# Patient Record
Sex: FEMALE | Race: WHITE | NOT HISPANIC OR LATINO | Employment: OTHER | URBAN - METROPOLITAN AREA
[De-identification: names, ages, dates, MRNs, and addresses within clinical notes are randomized per-mention and may not be internally consistent; named-entity substitution may affect disease eponyms.]

---

## 2017-02-15 RX ORDER — PROPAFENONE HYDROCHLORIDE 225 MG/1
CAPSULE, EXTENDED RELEASE ORAL
Qty: 180 CAPSULE | Refills: 2 | Status: SHIPPED | OUTPATIENT
Start: 2017-02-15 | End: 2017-06-12

## 2017-04-13 RX ORDER — METOPROLOL SUCCINATE 25 MG/1
TABLET, EXTENDED RELEASE ORAL
Qty: 90 TABLET | Refills: 2 | Status: SHIPPED | OUTPATIENT
Start: 2017-04-13 | End: 2017-06-12

## 2017-05-09 PROBLEM — Z85.3 HISTORY OF BREAST CANCER: Status: ACTIVE | Noted: 2017-05-09

## 2017-05-09 PROBLEM — I48.91 ATRIAL FIBRILLATION (HCC): Status: ACTIVE | Noted: 2017-05-09

## 2017-05-09 PROBLEM — E78.5 HYPERLIPIDEMIA: Status: ACTIVE | Noted: 2017-05-09

## 2017-05-09 PROBLEM — I10 HYPERTENSION: Status: ACTIVE | Noted: 2017-05-09

## 2017-05-09 PROBLEM — E03.9 HYPOTHYROIDISM: Status: ACTIVE | Noted: 2017-05-09

## 2017-05-09 PROBLEM — K21.9 GERD (GASTROESOPHAGEAL REFLUX DISEASE): Status: ACTIVE | Noted: 2017-05-09

## 2017-05-09 PROBLEM — M81.0 OSTEOPOROSIS: Status: ACTIVE | Noted: 2017-05-09

## 2017-05-09 PROBLEM — G25.81 RLS (RESTLESS LEGS SYNDROME): Status: ACTIVE | Noted: 2017-05-09

## 2017-05-09 PROBLEM — G43.909 MIGRAINE: Status: ACTIVE | Noted: 2017-05-09

## 2017-05-15 ENCOUNTER — OFFICE VISIT (OUTPATIENT)
Dept: CARDIOLOGY | Facility: CLINIC | Age: 82
End: 2017-05-15

## 2017-05-15 VITALS
BODY MASS INDEX: 17.49 KG/M2 | SYSTOLIC BLOOD PRESSURE: 130 MMHG | DIASTOLIC BLOOD PRESSURE: 72 MMHG | HEART RATE: 78 BPM | WEIGHT: 108.8 LBS | HEIGHT: 66 IN

## 2017-05-15 DIAGNOSIS — I38 VALVULAR HEART DISEASE: Primary | ICD-10-CM

## 2017-05-15 DIAGNOSIS — E78.2 MIXED HYPERLIPIDEMIA: ICD-10-CM

## 2017-05-15 DIAGNOSIS — I10 ESSENTIAL HYPERTENSION: ICD-10-CM

## 2017-05-15 DIAGNOSIS — R06.09 DYSPNEA ON EXERTION: ICD-10-CM

## 2017-05-15 DIAGNOSIS — I48.20 CHRONIC ATRIAL FIBRILLATION (HCC): ICD-10-CM

## 2017-05-15 PROCEDURE — 99214 OFFICE O/P EST MOD 30 MIN: CPT | Performed by: INTERNAL MEDICINE

## 2017-05-15 PROCEDURE — 93288 INTERROG EVL PM/LDLS PM IP: CPT | Performed by: INTERNAL MEDICINE

## 2017-05-19 ENCOUNTER — PREP FOR SURGERY (OUTPATIENT)
Dept: CARDIOLOGY | Facility: CLINIC | Age: 82
End: 2017-05-19

## 2017-05-19 DIAGNOSIS — R06.09 DYSPNEA ON EXERTION: Primary | ICD-10-CM

## 2017-05-19 DIAGNOSIS — I10 ESSENTIAL (PRIMARY) HYPERTENSION: ICD-10-CM

## 2017-05-19 RX ORDER — ONDANSETRON 2 MG/ML
4 INJECTION INTRAMUSCULAR; INTRAVENOUS EVERY 6 HOURS PRN
Status: CANCELLED | OUTPATIENT
Start: 2017-05-19

## 2017-05-19 RX ORDER — ASPIRIN 325 MG
325 TABLET ORAL ONCE
Status: CANCELLED | OUTPATIENT
Start: 2017-05-19 | End: 2017-05-19

## 2017-05-19 RX ORDER — ASPIRIN 81 MG/1
325 TABLET ORAL DAILY
Status: CANCELLED | OUTPATIENT
Start: 2017-05-20

## 2017-05-19 RX ORDER — SODIUM CHLORIDE 0.9 % (FLUSH) 0.9 %
1-10 SYRINGE (ML) INJECTION AS NEEDED
Status: CANCELLED | OUTPATIENT
Start: 2017-05-19

## 2017-05-19 RX ORDER — NITROGLYCERIN 0.4 MG/1
0.4 TABLET SUBLINGUAL
Status: CANCELLED | OUTPATIENT
Start: 2017-05-19

## 2017-05-19 RX ORDER — ACETAMINOPHEN 325 MG/1
650 TABLET ORAL EVERY 4 HOURS PRN
Status: CANCELLED | OUTPATIENT
Start: 2017-05-19

## 2017-05-21 ENCOUNTER — HOSPITAL ENCOUNTER (OUTPATIENT)
Dept: GENERAL RADIOLOGY | Facility: HOSPITAL | Age: 82
Discharge: HOME | End: 2017-05-21
Admitting: NURSE PRACTITIONER

## 2017-05-21 ENCOUNTER — APPOINTMENT (OUTPATIENT)
Dept: PREADMISSION TESTING | Facility: HOSPITAL | Age: 82
End: 2017-05-21

## 2017-05-21 DIAGNOSIS — R06.09 DYSPNEA ON EXERTION: ICD-10-CM

## 2017-05-21 LAB
ALBUMIN SERPL-MCNC: 3.8 G/DL (ref 3.2–4.8)
ALBUMIN/GLOB SERPL: 1.4 G/DL (ref 1.5–2.5)
ALP SERPL-CCNC: 53 U/L (ref 25–100)
ALT SERPL W P-5'-P-CCNC: 19 U/L (ref 7–40)
ANION GAP SERPL CALCULATED.3IONS-SCNC: 1 MMOL/L (ref 3–11)
AST SERPL-CCNC: 28 U/L (ref 0–33)
BILIRUB SERPL-MCNC: 0.3 MG/DL (ref 0.3–1.2)
BUN BLD-MCNC: 24 MG/DL (ref 9–23)
BUN/CREAT SERPL: 30 (ref 7–25)
CALCIUM SPEC-SCNC: 9.7 MG/DL (ref 8.7–10.4)
CHLORIDE SERPL-SCNC: 106 MMOL/L (ref 99–109)
CO2 SERPL-SCNC: 34 MMOL/L (ref 20–31)
CREAT BLD-MCNC: 0.8 MG/DL (ref 0.6–1.3)
DEPRECATED RDW RBC AUTO: 52.3 FL (ref 37–54)
ERYTHROCYTE [DISTWIDTH] IN BLOOD BY AUTOMATED COUNT: 14.2 % (ref 11.3–14.5)
GFR SERPL CREATININE-BSD FRML MDRD: 68 ML/MIN/1.73
GLOBULIN UR ELPH-MCNC: 2.8 GM/DL
GLUCOSE BLD-MCNC: 93 MG/DL (ref 70–100)
HCT VFR BLD AUTO: 39.9 % (ref 34.5–44)
HGB BLD-MCNC: 12.2 G/DL (ref 11.5–15.5)
MCH RBC QN AUTO: 30.7 PG (ref 27–31)
MCHC RBC AUTO-ENTMCNC: 30.6 G/DL (ref 32–36)
MCV RBC AUTO: 100.3 FL (ref 80–99)
PLATELET # BLD AUTO: 142 10*3/MM3 (ref 150–450)
PMV BLD AUTO: 9.8 FL (ref 6–12)
POTASSIUM BLD-SCNC: 5 MMOL/L (ref 3.5–5.5)
PROT SERPL-MCNC: 6.6 G/DL (ref 5.7–8.2)
RBC # BLD AUTO: 3.98 10*6/MM3 (ref 3.89–5.14)
SODIUM BLD-SCNC: 141 MMOL/L (ref 132–146)
WBC NRBC COR # BLD: 4.62 10*3/MM3 (ref 3.5–10.8)

## 2017-05-21 PROCEDURE — 71020 HC CHEST PA AND LATERAL: CPT

## 2017-05-21 PROCEDURE — 85027 COMPLETE CBC AUTOMATED: CPT | Performed by: NURSE PRACTITIONER

## 2017-05-21 PROCEDURE — 80053 COMPREHEN METABOLIC PANEL: CPT | Performed by: NURSE PRACTITIONER

## 2017-05-21 PROCEDURE — 36415 COLL VENOUS BLD VENIPUNCTURE: CPT

## 2017-05-21 RX ORDER — OMEPRAZOLE 40 MG/1
40 CAPSULE, DELAYED RELEASE ORAL EVERY MORNING
COMMUNITY

## 2017-05-21 RX ORDER — UBIDECARENONE 100 MG
100 CAPSULE ORAL DAILY
COMMUNITY
End: 2017-09-11

## 2017-05-21 RX ORDER — LEVOTHYROXINE SODIUM 0.15 MG/1
150 TABLET ORAL EVERY MORNING
COMMUNITY
End: 2018-01-01 | Stop reason: HOSPADM

## 2017-05-21 RX ORDER — MELATONIN
1000 DAILY
COMMUNITY

## 2017-05-21 RX ORDER — LANOLIN ALCOHOL/MO/W.PET/CERES
1000 CREAM (GRAM) TOPICAL DAILY
COMMUNITY

## 2017-05-22 ENCOUNTER — HOSPITAL ENCOUNTER (OUTPATIENT)
Facility: HOSPITAL | Age: 82
Discharge: HOME | End: 2017-05-23
Attending: INTERNAL MEDICINE | Admitting: INTERNAL MEDICINE

## 2017-05-22 ENCOUNTER — APPOINTMENT (OUTPATIENT)
Dept: CARDIOLOGY | Facility: HOSPITAL | Age: 82
End: 2017-05-22
Attending: INTERNAL MEDICINE

## 2017-05-22 DIAGNOSIS — R06.09 DYSPNEA ON EXERTION: ICD-10-CM

## 2017-05-22 LAB
ACT BLD: 229 SECONDS (ref 82–152)
ACT BLD: 358 SECONDS (ref 82–152)
ARTICHOKE IGE QN: 84 MG/DL (ref 0–130)
BH CV ECHO MEAS - BSA(HAYCOCK): 1.5 M^2
BH CV ECHO MEAS - BSA: 1.5 M^2
BH CV ECHO MEAS - BZI_BMI: 17.4 KILOGRAMS/M^2
BH CV ECHO MEAS - BZI_METRIC_HEIGHT: 167.6 CM
BH CV ECHO MEAS - BZI_METRIC_WEIGHT: 49 KG
BH CV ECHO MEAS - LA DIMENSION: 4 CM
BH CV ECHO MEAS - MV MAX PG: 6.7 MMHG
BH CV ECHO MEAS - MV MEAN PG: 1.8 MMHG
BH CV ECHO MEAS - MV V2 MAX: 129.2 CM/SEC
BH CV ECHO MEAS - MV V2 MEAN: 60.3 CM/SEC
BH CV ECHO MEAS - MV V2 VTI: 34.4 CM
CHOLEST SERPL-MCNC: 150 MG/DL (ref 0–200)
HDLC SERPL-MCNC: 66 MG/DL (ref 40–60)
LV EF 2D ECHO EST: 60 %
TRIGL SERPL-MCNC: 77 MG/DL (ref 0–150)

## 2017-05-22 PROCEDURE — A9270 NON-COVERED ITEM OR SERVICE: HCPCS | Performed by: INTERNAL MEDICINE

## 2017-05-22 PROCEDURE — 93458 L HRT ARTERY/VENTRICLE ANGIO: CPT | Performed by: INTERNAL MEDICINE

## 2017-05-22 PROCEDURE — 25010000002 HEPARIN (PORCINE) PER 1000 UNITS: Performed by: INTERNAL MEDICINE

## 2017-05-22 PROCEDURE — 93312 ECHO TRANSESOPHAGEAL: CPT | Performed by: INTERNAL MEDICINE

## 2017-05-22 PROCEDURE — 93320 DOPPLER ECHO COMPLETE: CPT | Performed by: INTERNAL MEDICINE

## 2017-05-22 PROCEDURE — 37221 PR REVSC OPN/PRQ ILIAC ART W/STNT PLMT & ANGIOPLSTY: CPT | Performed by: INTERNAL MEDICINE

## 2017-05-22 PROCEDURE — 93312 ECHO TRANSESOPHAGEAL: CPT

## 2017-05-22 PROCEDURE — C1876 STENT, NON-COA/NON-COV W/DEL: HCPCS | Performed by: INTERNAL MEDICINE

## 2017-05-22 PROCEDURE — 85347 COAGULATION TIME ACTIVATED: CPT

## 2017-05-22 PROCEDURE — 63710000001 PRAVASTATIN 40 MG TABLET: Performed by: INTERNAL MEDICINE

## 2017-05-22 PROCEDURE — C1769 GUIDE WIRE: HCPCS | Performed by: INTERNAL MEDICINE

## 2017-05-22 PROCEDURE — 80061 LIPID PANEL: CPT | Performed by: INTERNAL MEDICINE

## 2017-05-22 PROCEDURE — 93320 DOPPLER ECHO COMPLETE: CPT

## 2017-05-22 PROCEDURE — 63710000001 PROPAFENONE SR 225 MG CAPSULE SUSTAINED-RELEASE 12 HR: Performed by: INTERNAL MEDICINE

## 2017-05-22 PROCEDURE — C1725 CATH, TRANSLUMIN NON-LASER: HCPCS | Performed by: INTERNAL MEDICINE

## 2017-05-22 PROCEDURE — 25010000002 FENTANYL CITRATE (PF) 100 MCG/2ML SOLUTION: Performed by: INTERNAL MEDICINE

## 2017-05-22 PROCEDURE — 36415 COLL VENOUS BLD VENIPUNCTURE: CPT

## 2017-05-22 PROCEDURE — C1894 INTRO/SHEATH, NON-LASER: HCPCS | Performed by: INTERNAL MEDICINE

## 2017-05-22 PROCEDURE — 63710000001 DOCUSATE SODIUM 100 MG CAPSULE: Performed by: INTERNAL MEDICINE

## 2017-05-22 PROCEDURE — 0 IOPAMIDOL PER 1 ML: Performed by: INTERNAL MEDICINE

## 2017-05-22 PROCEDURE — 63710000001: Performed by: INTERNAL MEDICINE

## 2017-05-22 PROCEDURE — 75710 ARTERY X-RAYS ARM/LEG: CPT | Performed by: INTERNAL MEDICINE

## 2017-05-22 PROCEDURE — 93325 DOPPLER ECHO COLOR FLOW MAPG: CPT

## 2017-05-22 PROCEDURE — 63710000001 CHOLECALCIFEROL 1000 UNITS TABLET: Performed by: INTERNAL MEDICINE

## 2017-05-22 PROCEDURE — 93325 DOPPLER ECHO COLOR FLOW MAPG: CPT | Performed by: INTERNAL MEDICINE

## 2017-05-22 PROCEDURE — 63710000001 VITAMIN B-12 1000 MCG TABLET: Performed by: INTERNAL MEDICINE

## 2017-05-22 PROCEDURE — 25010000002 MIDAZOLAM PER 1 MG: Performed by: INTERNAL MEDICINE

## 2017-05-22 PROCEDURE — 63710000001 MELOXICAM 7.5 MG TABLET: Performed by: INTERNAL MEDICINE

## 2017-05-22 DEVICE — PREMOUNTED STENT SYSTEM
Type: IMPLANTABLE DEVICE | Status: FUNCTIONAL
Brand: EXPRESS® LD ILIAC / BILIARY

## 2017-05-22 RX ORDER — FLUMAZENIL 0.1 MG/ML
INJECTION INTRAVENOUS
Status: DISCONTINUED
Start: 2017-05-22 | End: 2017-05-22 | Stop reason: WASHOUT

## 2017-05-22 RX ORDER — MELATONIN
1000 DAILY
Status: DISCONTINUED | OUTPATIENT
Start: 2017-05-22 | End: 2017-05-23 | Stop reason: HOSPADM

## 2017-05-22 RX ORDER — FENTANYL CITRATE 50 UG/ML
INJECTION, SOLUTION INTRAMUSCULAR; INTRAVENOUS
Status: COMPLETED | OUTPATIENT
Start: 2017-05-22 | End: 2017-05-22

## 2017-05-22 RX ORDER — SODIUM CHLORIDE 0.9 % (FLUSH) 0.9 %
1-10 SYRINGE (ML) INJECTION AS NEEDED
Status: DISCONTINUED | OUTPATIENT
Start: 2017-05-22 | End: 2017-05-22 | Stop reason: HOSPADM

## 2017-05-22 RX ORDER — PROPAFENONE HYDROCHLORIDE 225 MG/1
225 CAPSULE, EXTENDED RELEASE ORAL EVERY 12 HOURS SCHEDULED
Status: DISCONTINUED | OUTPATIENT
Start: 2017-05-22 | End: 2017-05-23 | Stop reason: HOSPADM

## 2017-05-22 RX ORDER — HEPARIN SODIUM 1000 [USP'U]/ML
INJECTION, SOLUTION INTRAVENOUS; SUBCUTANEOUS AS NEEDED
Status: DISCONTINUED | OUTPATIENT
Start: 2017-05-22 | End: 2017-05-22 | Stop reason: HOSPADM

## 2017-05-22 RX ORDER — ACETAMINOPHEN 325 MG/1
650 TABLET ORAL EVERY 4 HOURS PRN
Status: DISCONTINUED | OUTPATIENT
Start: 2017-05-22 | End: 2017-05-23 | Stop reason: HOSPADM

## 2017-05-22 RX ORDER — METOPROLOL SUCCINATE 25 MG/1
25 TABLET, EXTENDED RELEASE ORAL
Status: DISCONTINUED | OUTPATIENT
Start: 2017-05-23 | End: 2017-05-23 | Stop reason: HOSPADM

## 2017-05-22 RX ORDER — SENNA AND DOCUSATE SODIUM 50; 8.6 MG/1; MG/1
2 TABLET, FILM COATED ORAL NIGHTLY
Status: DISCONTINUED | OUTPATIENT
Start: 2017-05-22 | End: 2017-05-23 | Stop reason: HOSPADM

## 2017-05-22 RX ORDER — CLONAZEPAM 0.5 MG/1
0.5 TABLET ORAL DAILY
Status: DISCONTINUED | OUTPATIENT
Start: 2017-05-22 | End: 2017-05-22 | Stop reason: SDUPTHER

## 2017-05-22 RX ORDER — MIDAZOLAM HYDROCHLORIDE 1 MG/ML
INJECTION INTRAMUSCULAR; INTRAVENOUS
Status: COMPLETED | OUTPATIENT
Start: 2017-05-22 | End: 2017-05-22

## 2017-05-22 RX ORDER — CLOPIDOGREL BISULFATE 75 MG/1
600 TABLET ORAL ONCE
Status: DISCONTINUED | OUTPATIENT
Start: 2017-05-22 | End: 2017-05-23 | Stop reason: HOSPADM

## 2017-05-22 RX ORDER — TEMAZEPAM 7.5 MG/1
7.5 CAPSULE ORAL NIGHTLY PRN
Status: DISCONTINUED | OUTPATIENT
Start: 2017-05-22 | End: 2017-05-23 | Stop reason: HOSPADM

## 2017-05-22 RX ORDER — ACETAMINOPHEN 325 MG/1
650 TABLET ORAL EVERY 4 HOURS PRN
Status: DISCONTINUED | OUTPATIENT
Start: 2017-05-22 | End: 2017-05-22 | Stop reason: HOSPADM

## 2017-05-22 RX ORDER — DOCUSATE SODIUM 100 MG/1
100 CAPSULE, LIQUID FILLED ORAL 2 TIMES DAILY
Status: DISCONTINUED | OUTPATIENT
Start: 2017-05-22 | End: 2017-05-23 | Stop reason: HOSPADM

## 2017-05-22 RX ORDER — HYDROCODONE BITARTRATE AND ACETAMINOPHEN 5; 325 MG/1; MG/1
1 TABLET ORAL EVERY 6 HOURS PRN
Status: DISCONTINUED | OUTPATIENT
Start: 2017-05-22 | End: 2017-05-23 | Stop reason: HOSPADM

## 2017-05-22 RX ORDER — ONDANSETRON 2 MG/ML
4 INJECTION INTRAMUSCULAR; INTRAVENOUS EVERY 6 HOURS PRN
Status: DISCONTINUED | OUTPATIENT
Start: 2017-05-22 | End: 2017-05-22 | Stop reason: HOSPADM

## 2017-05-22 RX ORDER — CLONAZEPAM 0.5 MG/1
0.5 TABLET ORAL NIGHTLY
Status: DISCONTINUED | OUTPATIENT
Start: 2017-05-22 | End: 2017-05-23 | Stop reason: HOSPADM

## 2017-05-22 RX ORDER — MORPHINE SULFATE 2 MG/ML
1 INJECTION, SOLUTION INTRAMUSCULAR; INTRAVENOUS EVERY 4 HOURS PRN
Status: DISCONTINUED | OUTPATIENT
Start: 2017-05-22 | End: 2017-05-23 | Stop reason: HOSPADM

## 2017-05-22 RX ORDER — ASPIRIN 325 MG
325 TABLET, DELAYED RELEASE (ENTERIC COATED) ORAL DAILY
Status: DISCONTINUED | OUTPATIENT
Start: 2017-05-22 | End: 2017-05-23 | Stop reason: HOSPADM

## 2017-05-22 RX ORDER — HYDROCODONE BITARTRATE AND ACETAMINOPHEN 7.5; 325 MG/1; MG/1
1 TABLET ORAL EVERY 4 HOURS PRN
Status: DISCONTINUED | OUTPATIENT
Start: 2017-05-22 | End: 2017-05-23 | Stop reason: HOSPADM

## 2017-05-22 RX ORDER — FENTANYL CITRATE 50 UG/ML
INJECTION, SOLUTION INTRAMUSCULAR; INTRAVENOUS AS NEEDED
Status: DISCONTINUED | OUTPATIENT
Start: 2017-05-22 | End: 2017-05-22 | Stop reason: HOSPADM

## 2017-05-22 RX ORDER — ASPIRIN 325 MG
325 TABLET, DELAYED RELEASE (ENTERIC COATED) ORAL DAILY
Status: DISCONTINUED | OUTPATIENT
Start: 2017-05-23 | End: 2017-05-22 | Stop reason: HOSPADM

## 2017-05-22 RX ORDER — MIDAZOLAM HYDROCHLORIDE 1 MG/ML
INJECTION INTRAMUSCULAR; INTRAVENOUS AS NEEDED
Status: DISCONTINUED | OUTPATIENT
Start: 2017-05-22 | End: 2017-05-22 | Stop reason: HOSPADM

## 2017-05-22 RX ORDER — PRAVASTATIN SODIUM 40 MG
40 TABLET ORAL NIGHTLY
Status: DISCONTINUED | OUTPATIENT
Start: 2017-05-22 | End: 2017-05-23 | Stop reason: HOSPADM

## 2017-05-22 RX ORDER — LIDOCAINE HYDROCHLORIDE 10 MG/ML
INJECTION, SOLUTION INFILTRATION; PERINEURAL AS NEEDED
Status: DISCONTINUED | OUTPATIENT
Start: 2017-05-22 | End: 2017-05-22 | Stop reason: HOSPADM

## 2017-05-22 RX ORDER — NALOXONE HCL 0.4 MG/ML
0.4 VIAL (ML) INJECTION
Status: DISCONTINUED | OUTPATIENT
Start: 2017-05-22 | End: 2017-05-23 | Stop reason: HOSPADM

## 2017-05-22 RX ORDER — NITROGLYCERIN 0.4 MG/1
0.4 TABLET SUBLINGUAL
Status: DISCONTINUED | OUTPATIENT
Start: 2017-05-22 | End: 2017-05-22 | Stop reason: HOSPADM

## 2017-05-22 RX ORDER — FENTANYL CITRATE 50 UG/ML
INJECTION, SOLUTION INTRAMUSCULAR; INTRAVENOUS
Status: DISCONTINUED
Start: 2017-05-22 | End: 2017-05-23 | Stop reason: HOSPADM

## 2017-05-22 RX ORDER — WARFARIN SODIUM 3 MG/1
3 TABLET ORAL
Status: DISCONTINUED | OUTPATIENT
Start: 2017-05-22 | End: 2017-05-23 | Stop reason: HOSPADM

## 2017-05-22 RX ORDER — NALOXONE HYDROCHLORIDE 0.4 MG/ML
INJECTION, SOLUTION INTRAMUSCULAR; INTRAVENOUS; SUBCUTANEOUS
Status: DISCONTINUED
Start: 2017-05-22 | End: 2017-05-22 | Stop reason: WASHOUT

## 2017-05-22 RX ORDER — LANOLIN ALCOHOL/MO/W.PET/CERES
500 CREAM (GRAM) TOPICAL DAILY
Status: DISCONTINUED | OUTPATIENT
Start: 2017-05-22 | End: 2017-05-23 | Stop reason: HOSPADM

## 2017-05-22 RX ORDER — CLOPIDOGREL BISULFATE 75 MG/1
TABLET ORAL AS NEEDED
Status: DISCONTINUED | OUTPATIENT
Start: 2017-05-22 | End: 2017-05-22 | Stop reason: HOSPADM

## 2017-05-22 RX ORDER — ASPIRIN 325 MG
325 TABLET ORAL ONCE
Status: COMPLETED | OUTPATIENT
Start: 2017-05-22 | End: 2017-05-22

## 2017-05-22 RX ORDER — MELOXICAM 7.5 MG/1
7.5 TABLET ORAL DAILY
Status: DISCONTINUED | OUTPATIENT
Start: 2017-05-22 | End: 2017-05-23 | Stop reason: HOSPADM

## 2017-05-22 RX ORDER — MIDAZOLAM HYDROCHLORIDE 1 MG/ML
INJECTION INTRAMUSCULAR; INTRAVENOUS
Status: DISCONTINUED
Start: 2017-05-22 | End: 2017-05-23 | Stop reason: HOSPADM

## 2017-05-22 RX ORDER — WARFARIN SODIUM 3 MG/1
1.5 TABLET ORAL
Status: DISCONTINUED | OUTPATIENT
Start: 2017-05-22 | End: 2017-05-23 | Stop reason: HOSPADM

## 2017-05-22 RX ORDER — SODIUM CHLORIDE 9 MG/ML
1-3 INJECTION, SOLUTION INTRAVENOUS CONTINUOUS
Status: DISCONTINUED | OUTPATIENT
Start: 2017-05-22 | End: 2017-05-23 | Stop reason: HOSPADM

## 2017-05-22 RX ORDER — POLYETHYLENE GLYCOL 3350 17 G/17G
17 POWDER, FOR SOLUTION ORAL DAILY
Status: DISCONTINUED | OUTPATIENT
Start: 2017-05-22 | End: 2017-05-23 | Stop reason: HOSPADM

## 2017-05-22 RX ORDER — BISACODYL 10 MG
10 SUPPOSITORY, RECTAL RECTAL DAILY PRN
Status: DISCONTINUED | OUTPATIENT
Start: 2017-05-22 | End: 2017-05-23 | Stop reason: HOSPADM

## 2017-05-22 RX ORDER — CLOPIDOGREL BISULFATE 75 MG/1
75 TABLET ORAL DAILY
Status: DISCONTINUED | OUTPATIENT
Start: 2017-05-23 | End: 2017-05-23

## 2017-05-22 RX ORDER — SODIUM CHLORIDE 9 MG/ML
50 INJECTION, SOLUTION INTRAVENOUS CONTINUOUS
Status: ACTIVE | OUTPATIENT
Start: 2017-05-22 | End: 2017-05-22

## 2017-05-22 RX ORDER — LEVOTHYROXINE SODIUM 0.15 MG/1
150 TABLET ORAL DAILY
Status: DISCONTINUED | OUTPATIENT
Start: 2017-05-22 | End: 2017-05-23 | Stop reason: HOSPADM

## 2017-05-22 RX ADMIN — FENTANYL CITRATE 50 MCG: 50 INJECTION, SOLUTION INTRAMUSCULAR; INTRAVENOUS at 14:58

## 2017-05-22 RX ADMIN — VITAMIN D, TAB 1000IU (100/BT) 1000 UNITS: 25 TAB at 11:56

## 2017-05-22 RX ADMIN — PROPAFENONE HYDROCHLORIDE 225 MG: 225 CAPSULE, EXTENDED RELEASE ORAL at 20:01

## 2017-05-22 RX ADMIN — PANCRELIPASE 24000 UNITS OF LIPASE: 24000; 76000; 120000 CAPSULE, DELAYED RELEASE PELLETS ORAL at 19:59

## 2017-05-22 RX ADMIN — MIDAZOLAM HYDROCHLORIDE 2 MG: 1 INJECTION, SOLUTION INTRAMUSCULAR; INTRAVENOUS at 15:01

## 2017-05-22 RX ADMIN — MELOXICAM 7.5 MG: 7.5 TABLET ORAL at 11:56

## 2017-05-22 RX ADMIN — MIDAZOLAM HYDROCHLORIDE 2 MG: 1 INJECTION, SOLUTION INTRAMUSCULAR; INTRAVENOUS at 14:57

## 2017-05-22 RX ADMIN — FENTANYL CITRATE 50 MCG: 50 INJECTION, SOLUTION INTRAMUSCULAR; INTRAVENOUS at 15:01

## 2017-05-22 RX ADMIN — SODIUM CHLORIDE 3 ML/KG/HR: 9 INJECTION, SOLUTION INTRAVENOUS at 08:05

## 2017-05-22 RX ADMIN — PANCRELIPASE 24000 UNITS OF LIPASE: 24000; 76000; 120000 CAPSULE, DELAYED RELEASE PELLETS ORAL at 11:56

## 2017-05-22 RX ADMIN — CYANOCOBALAMIN TAB 1000 MCG 500 MCG: 1000 TAB at 11:56

## 2017-05-22 RX ADMIN — ASPIRIN 325 MG ORAL TABLET 325 MG: 325 PILL ORAL at 08:04

## 2017-05-22 RX ADMIN — DOCUSATE SODIUM 100 MG: 100 CAPSULE, LIQUID FILLED ORAL at 19:58

## 2017-05-22 RX ADMIN — PRAVASTATIN SODIUM 40 MG: 40 TABLET ORAL at 20:00

## 2017-05-23 VITALS
SYSTOLIC BLOOD PRESSURE: 138 MMHG | HEIGHT: 66 IN | BODY MASS INDEX: 17.47 KG/M2 | OXYGEN SATURATION: 92 % | WEIGHT: 108.69 LBS | HEART RATE: 75 BPM | TEMPERATURE: 97.5 F | RESPIRATION RATE: 16 BRPM | DIASTOLIC BLOOD PRESSURE: 61 MMHG

## 2017-05-23 LAB
ACT BLD: 157 SECONDS (ref 82–152)
ACT BLD: 178 SECONDS (ref 82–152)
ACT BLD: 214 SECONDS (ref 82–152)
ACT BLD: 240 SECONDS (ref 82–152)
ANION GAP SERPL CALCULATED.3IONS-SCNC: 5 MMOL/L (ref 3–11)
BUN BLD-MCNC: 15 MG/DL (ref 9–23)
BUN/CREAT SERPL: 21.4 (ref 7–25)
CALCIUM SPEC-SCNC: 9.2 MG/DL (ref 8.7–10.4)
CHLORIDE SERPL-SCNC: 104 MMOL/L (ref 99–109)
CO2 SERPL-SCNC: 30 MMOL/L (ref 20–31)
CREAT BLD-MCNC: 0.7 MG/DL (ref 0.6–1.3)
GFR SERPL CREATININE-BSD FRML MDRD: 79 ML/MIN/1.73
GLUCOSE BLD-MCNC: 100 MG/DL (ref 70–100)
POTASSIUM BLD-SCNC: 4.2 MMOL/L (ref 3.5–5.5)
SODIUM BLD-SCNC: 139 MMOL/L (ref 132–146)

## 2017-05-23 PROCEDURE — 63710000001 PROPAFENONE SR 225 MG CAPSULE SUSTAINED-RELEASE 12 HR: Performed by: INTERNAL MEDICINE

## 2017-05-23 PROCEDURE — A9270 NON-COVERED ITEM OR SERVICE: HCPCS | Performed by: INTERNAL MEDICINE

## 2017-05-23 PROCEDURE — 80048 BASIC METABOLIC PNL TOTAL CA: CPT | Performed by: INTERNAL MEDICINE

## 2017-05-23 PROCEDURE — 63710000001 ASPIRIN EC 325 MG TABLET DELAYED-RELEASE: Performed by: INTERNAL MEDICINE

## 2017-05-23 PROCEDURE — 63710000001 CLOPIDOGREL 75 MG TABLET: Performed by: INTERNAL MEDICINE

## 2017-05-23 PROCEDURE — 63710000001 VITAMIN B-12 1000 MCG TABLET: Performed by: INTERNAL MEDICINE

## 2017-05-23 PROCEDURE — 63710000001 CHOLECALCIFEROL 1000 UNITS TABLET: Performed by: INTERNAL MEDICINE

## 2017-05-23 PROCEDURE — 63710000001 LEVOTHYROXINE 150 MCG TABLET: Performed by: INTERNAL MEDICINE

## 2017-05-23 PROCEDURE — 63710000001 MELOXICAM 7.5 MG TABLET: Performed by: INTERNAL MEDICINE

## 2017-05-23 PROCEDURE — 99213 OFFICE O/P EST LOW 20 MIN: CPT | Performed by: INTERNAL MEDICINE

## 2017-05-23 PROCEDURE — 63710000001: Performed by: INTERNAL MEDICINE

## 2017-05-23 RX ORDER — CLOPIDOGREL BISULFATE 75 MG/1
75 TABLET ORAL DAILY
Qty: 30 TABLET | Refills: 1 | Status: CANCELLED | OUTPATIENT
Start: 2017-05-23

## 2017-05-23 RX ADMIN — MELOXICAM 7.5 MG: 7.5 TABLET ORAL at 08:09

## 2017-05-23 RX ADMIN — ASPIRIN 325 MG: 325 TABLET, DELAYED RELEASE ORAL at 08:14

## 2017-05-23 RX ADMIN — LEVOTHYROXINE SODIUM 150 MCG: 150 TABLET ORAL at 08:10

## 2017-05-23 RX ADMIN — CLOPIDOGREL BISULFATE 75 MG: 75 TABLET ORAL at 08:14

## 2017-05-23 RX ADMIN — VITAMIN D, TAB 1000IU (100/BT) 1000 UNITS: 25 TAB at 08:10

## 2017-05-23 RX ADMIN — PROPAFENONE HYDROCHLORIDE 225 MG: 225 CAPSULE, EXTENDED RELEASE ORAL at 08:09

## 2017-05-23 RX ADMIN — PANCRELIPASE 24000 UNITS OF LIPASE: 24000; 76000; 120000 CAPSULE, DELAYED RELEASE PELLETS ORAL at 08:08

## 2017-05-23 RX ADMIN — CYANOCOBALAMIN TAB 1000 MCG 500 MCG: 1000 TAB at 08:09

## 2017-05-25 ENCOUNTER — TRANSCRIBE ORDERS (OUTPATIENT)
Dept: CARDIOLOGY | Facility: CLINIC | Age: 82
End: 2017-05-25

## 2017-05-25 DIAGNOSIS — I34.0 SEVERE MITRAL REGURGITATION: Primary | ICD-10-CM

## 2017-05-26 ENCOUNTER — PREP FOR SURGERY (OUTPATIENT)
Dept: OTHER | Facility: HOSPITAL | Age: 82
End: 2017-05-26

## 2017-05-30 ENCOUNTER — OFFICE VISIT (OUTPATIENT)
Dept: CARDIAC SURGERY | Facility: CLINIC | Age: 82
End: 2017-05-30

## 2017-05-30 ENCOUNTER — DOCUMENTATION (OUTPATIENT)
Dept: CARDIAC REHAB | Facility: HOSPITAL | Age: 82
End: 2017-05-30

## 2017-05-30 VITALS
WEIGHT: 108.2 LBS | OXYGEN SATURATION: 96 % | HEIGHT: 66 IN | DIASTOLIC BLOOD PRESSURE: 57 MMHG | HEART RATE: 82 BPM | BODY MASS INDEX: 17.39 KG/M2 | TEMPERATURE: 97.2 F | SYSTOLIC BLOOD PRESSURE: 123 MMHG

## 2017-05-30 DIAGNOSIS — I34.0 MITRAL VALVE INSUFFICIENCY, UNSPECIFIED ETIOLOGY: Primary | ICD-10-CM

## 2017-05-30 PROCEDURE — 99205 OFFICE O/P NEW HI 60 MIN: CPT | Performed by: THORACIC SURGERY (CARDIOTHORACIC VASCULAR SURGERY)

## 2017-06-05 ENCOUNTER — TELEPHONE (OUTPATIENT)
Dept: CARDIOLOGY | Facility: HOSPITAL | Age: 82
End: 2017-06-05

## 2017-06-05 ENCOUNTER — PREP FOR SURGERY (OUTPATIENT)
Dept: OTHER | Facility: HOSPITAL | Age: 82
End: 2017-06-05

## 2017-06-05 DIAGNOSIS — I05.9 MITRAL VALVE DISEASE: Primary | ICD-10-CM

## 2017-06-05 RX ORDER — SODIUM CHLORIDE 0.9 % (FLUSH) 0.9 %
1-10 SYRINGE (ML) INJECTION AS NEEDED
Status: CANCELLED | OUTPATIENT
Start: 2017-06-05

## 2017-06-05 RX ORDER — SODIUM CHLORIDE 9 MG/ML
125 INJECTION, SOLUTION INTRAVENOUS CONTINUOUS
Status: CANCELLED | OUTPATIENT
Start: 2017-06-05

## 2017-06-05 NOTE — TELEPHONE ENCOUNTER
Spoke with Nazia Britton, daughter, for Mitraclip instructions scheduled for 6/13/17.  Last dose warfarin Wednesday 6/7/17.  PAT 5:15 PM on Tuesday 6/12/17.  Bring all meds and a .  [facility] stay after PAT.  Come to [facility] second floor of [address] building @ 0600 the morning of 6/13/17.  Ms. Britton read back to me the above information correctly.

## 2017-06-12 ENCOUNTER — HOSPITAL ENCOUNTER (OUTPATIENT)
Dept: GENERAL RADIOLOGY | Facility: HOSPITAL | Age: 82
Discharge: HOME | End: 2017-06-12
Admitting: NURSE PRACTITIONER

## 2017-06-12 ENCOUNTER — TRANSCRIBE ORDERS (OUTPATIENT)
Dept: CARDIOLOGY | Facility: CLINIC | Age: 82
End: 2017-06-12

## 2017-06-12 ENCOUNTER — APPOINTMENT (OUTPATIENT)
Dept: PREADMISSION TESTING | Facility: HOSPITAL | Age: 82
End: 2017-06-12

## 2017-06-12 DIAGNOSIS — I05.9 MITRAL VALVE DISEASE: ICD-10-CM

## 2017-06-12 DIAGNOSIS — I10 ESSENTIAL (PRIMARY) HYPERTENSION: ICD-10-CM

## 2017-06-12 DIAGNOSIS — I34.0 SEVERE MITRAL REGURGITATION BY PRIOR ECHOCARDIOGRAM: Primary | ICD-10-CM

## 2017-06-12 DIAGNOSIS — R06.09 DYSPNEA ON EXERTION: ICD-10-CM

## 2017-06-12 LAB
ANION GAP SERPL CALCULATED.3IONS-SCNC: 2 MMOL/L (ref 3–11)
ARTICHOKE IGE QN: 67 MG/DL (ref 0–130)
BUN BLD-MCNC: 26 MG/DL (ref 9–23)
BUN/CREAT SERPL: 26 (ref 7–25)
CALCIUM SPEC-SCNC: 9.6 MG/DL (ref 8.7–10.4)
CHLORIDE SERPL-SCNC: 105 MMOL/L (ref 99–109)
CHOLEST SERPL-MCNC: 144 MG/DL (ref 0–200)
CO2 SERPL-SCNC: 33 MMOL/L (ref 20–31)
CREAT BLD-MCNC: 1 MG/DL (ref 0.6–1.3)
DEPRECATED RDW RBC AUTO: 53.5 FL (ref 37–54)
ERYTHROCYTE [DISTWIDTH] IN BLOOD BY AUTOMATED COUNT: 14.3 % (ref 11.3–14.5)
GFR SERPL CREATININE-BSD FRML MDRD: 52 ML/MIN/1.73
GLUCOSE BLD-MCNC: 96 MG/DL (ref 70–100)
HCT VFR BLD AUTO: 39.1 % (ref 34.5–44)
HDLC SERPL-MCNC: 60 MG/DL (ref 40–60)
HGB BLD-MCNC: 11.8 G/DL (ref 11.5–15.5)
MCH RBC QN AUTO: 30.7 PG (ref 27–31)
MCHC RBC AUTO-ENTMCNC: 30.2 G/DL (ref 32–36)
MCV RBC AUTO: 101.8 FL (ref 80–99)
PLATELET # BLD AUTO: 159 10*3/MM3 (ref 150–450)
PMV BLD AUTO: 10.1 FL (ref 6–12)
POTASSIUM BLD-SCNC: 4.7 MMOL/L (ref 3.5–5.5)
RBC # BLD AUTO: 3.84 10*6/MM3 (ref 3.89–5.14)
SODIUM BLD-SCNC: 140 MMOL/L (ref 132–146)
TRIGL SERPL-MCNC: 87 MG/DL (ref 0–150)
WBC NRBC COR # BLD: 5.11 10*3/MM3 (ref 3.5–10.8)

## 2017-06-12 PROCEDURE — 80048 BASIC METABOLIC PNL TOTAL CA: CPT | Performed by: NURSE PRACTITIONER

## 2017-06-12 PROCEDURE — 36415 COLL VENOUS BLD VENIPUNCTURE: CPT

## 2017-06-12 PROCEDURE — 80061 LIPID PANEL: CPT | Performed by: NURSE PRACTITIONER

## 2017-06-12 PROCEDURE — 93010 ELECTROCARDIOGRAM REPORT: CPT | Performed by: INTERNAL MEDICINE

## 2017-06-12 PROCEDURE — 85027 COMPLETE CBC AUTOMATED: CPT | Performed by: NURSE PRACTITIONER

## 2017-06-12 PROCEDURE — 71020 HC CHEST PA AND LATERAL: CPT

## 2017-06-12 PROCEDURE — 93005 ELECTROCARDIOGRAM TRACING: CPT

## 2017-06-12 RX ORDER — PROPAFENONE HYDROCHLORIDE 225 MG/1
225 TABLET, FILM COATED ORAL EVERY 12 HOURS
COMMUNITY
End: 2017-09-29 | Stop reason: HOSPADM

## 2017-06-12 RX ORDER — METOPROLOL SUCCINATE 25 MG/1
25 TABLET, EXTENDED RELEASE ORAL EVERY MORNING
Status: ON HOLD | COMMUNITY
End: 2017-09-29

## 2017-06-12 RX ORDER — ROPINIROLE 0.5 MG/1
0.5 TABLET, FILM COATED ORAL NIGHTLY
COMMUNITY
End: 2017-08-30

## 2017-06-13 ENCOUNTER — HOSPITAL ENCOUNTER (OUTPATIENT)
Facility: HOSPITAL | Age: 82
Setting detail: HOSPITAL OUTPATIENT SURGERY
Discharge: HOME | End: 2017-06-13
Attending: INTERNAL MEDICINE | Admitting: INTERNAL MEDICINE

## 2017-06-13 ENCOUNTER — ANESTHESIA (OUTPATIENT)
Dept: CARDIOLOGY | Facility: HOSPITAL | Age: 82
End: 2017-06-13

## 2017-06-13 ENCOUNTER — APPOINTMENT (OUTPATIENT)
Dept: CARDIOLOGY | Facility: HOSPITAL | Age: 82
End: 2017-06-13
Attending: INTERNAL MEDICINE

## 2017-06-13 ENCOUNTER — APPOINTMENT (OUTPATIENT)
Dept: GENERAL RADIOLOGY | Facility: HOSPITAL | Age: 82
End: 2017-06-13

## 2017-06-13 ENCOUNTER — HOSPITAL ENCOUNTER (OUTPATIENT)
Dept: CARDIOLOGY | Facility: HOSPITAL | Age: 82
Setting detail: HOSPITAL OUTPATIENT SURGERY
Discharge: HOME | End: 2017-06-13
Attending: INTERNAL MEDICINE

## 2017-06-13 ENCOUNTER — ANESTHESIA EVENT (OUTPATIENT)
Dept: CARDIOLOGY | Facility: HOSPITAL | Age: 82
End: 2017-06-13

## 2017-06-13 VITALS
SYSTOLIC BLOOD PRESSURE: 161 MMHG | RESPIRATION RATE: 16 BRPM | DIASTOLIC BLOOD PRESSURE: 78 MMHG | HEIGHT: 66 IN | BODY MASS INDEX: 17.25 KG/M2 | TEMPERATURE: 97.5 F | OXYGEN SATURATION: 100 % | WEIGHT: 107.36 LBS | HEART RATE: 83 BPM

## 2017-06-13 DIAGNOSIS — I05.9 MITRAL VALVE DISEASE: ICD-10-CM

## 2017-06-13 DIAGNOSIS — I34.0 SEVERE MITRAL REGURGITATION: ICD-10-CM

## 2017-06-13 LAB
ACT BLD: 250 SECONDS (ref 82–152)
BH CV ECHO MEAS - BSA(HAYCOCK): 1.5 M^2
BH CV ECHO MEAS - BSA: 1.5 M^2
BH CV ECHO MEAS - BZI_BMI: 17.3 KILOGRAMS/M^2
BH CV ECHO MEAS - BZI_METRIC_HEIGHT: 167.6 CM
BH CV ECHO MEAS - BZI_METRIC_WEIGHT: 48.5 KG
BH CV ECHO MEAS - MV MEAN PG: 3 MMHG
BH CV ECHO MEAS - MVA(P1/2T): 3 CM2

## 2017-06-13 PROCEDURE — 25010000002 ONDANSETRON PER 1 MG: Performed by: NURSE ANESTHETIST, CERTIFIED REGISTERED

## 2017-06-13 PROCEDURE — C1894 INTRO/SHEATH, NON-LASER: HCPCS | Performed by: INTERNAL MEDICINE

## 2017-06-13 PROCEDURE — 25010000002 PHENYLEPHRINE PER 1 ML: Performed by: NURSE ANESTHETIST, CERTIFIED REGISTERED

## 2017-06-13 PROCEDURE — 71010 HC CHEST PA OR AP: CPT

## 2017-06-13 PROCEDURE — 33418 REPAIR TCAT MITRAL VALVE: CPT | Performed by: INTERNAL MEDICINE

## 2017-06-13 PROCEDURE — 93355 ECHO TRANSESOPHAGEAL (TEE): CPT | Performed by: INTERNAL MEDICINE

## 2017-06-13 PROCEDURE — 93320 DOPPLER ECHO COMPLETE: CPT

## 2017-06-13 PROCEDURE — 93312 ECHO TRANSESOPHAGEAL: CPT

## 2017-06-13 PROCEDURE — C1751 CATH, INF, PER/CENT/MIDLINE: HCPCS | Performed by: ANESTHESIOLOGY

## 2017-06-13 PROCEDURE — C1759 CATH, INTRA ECHOCARDIOGRAPHY: HCPCS | Performed by: INTERNAL MEDICINE

## 2017-06-13 PROCEDURE — 93308 TTE F-UP OR LMTD: CPT

## 2017-06-13 PROCEDURE — 93355 ECHO TRANSESOPHAGEAL (TEE): CPT

## 2017-06-13 PROCEDURE — 76376 3D RENDER W/INTRP POSTPROCES: CPT

## 2017-06-13 PROCEDURE — C1887 CATHETER, GUIDING: HCPCS | Performed by: INTERNAL MEDICINE

## 2017-06-13 PROCEDURE — 93308 TTE F-UP OR LMTD: CPT | Performed by: INTERNAL MEDICINE

## 2017-06-13 PROCEDURE — 25010000003 CEFAZOLIN IN DEXTROSE 2-4 GM/100ML-% SOLUTION: Performed by: NURSE PRACTITIONER

## 2017-06-13 PROCEDURE — 93462 L HRT CATH TRNSPTL PUNCTURE: CPT | Performed by: INTERNAL MEDICINE

## 2017-06-13 PROCEDURE — 25010000002 HEPARIN (PORCINE) PER 1000 UNITS: Performed by: NURSE ANESTHETIST, CERTIFIED REGISTERED

## 2017-06-13 PROCEDURE — 93325 DOPPLER ECHO COLOR FLOW MAPG: CPT

## 2017-06-13 PROCEDURE — C1769 GUIDE WIRE: HCPCS | Performed by: INTERNAL MEDICINE

## 2017-06-13 PROCEDURE — 93662 INTRACARDIAC ECG (ICE): CPT | Performed by: INTERNAL MEDICINE

## 2017-06-13 PROCEDURE — 25010000002 NEOSTIGMINE PER 0.5 MG: Performed by: NURSE ANESTHETIST, CERTIFIED REGISTERED

## 2017-06-13 PROCEDURE — 25010000002 PROPOFOL 10 MG/ML EMULSION: Performed by: NURSE ANESTHETIST, CERTIFIED REGISTERED

## 2017-06-13 PROCEDURE — 85347 COAGULATION TIME ACTIVATED: CPT

## 2017-06-13 PROCEDURE — 25010000002 DEXAMETHASONE PER 1 MG: Performed by: NURSE ANESTHETIST, CERTIFIED REGISTERED

## 2017-06-13 PROCEDURE — C1893 INTRO/SHEATH, FIXED,NON-PEEL: HCPCS | Performed by: INTERNAL MEDICINE

## 2017-06-13 RX ORDER — DEXAMETHASONE SODIUM PHOSPHATE 4 MG/ML
INJECTION, SOLUTION INTRA-ARTICULAR; INTRALESIONAL; INTRAMUSCULAR; INTRAVENOUS; SOFT TISSUE AS NEEDED
Status: DISCONTINUED | OUTPATIENT
Start: 2017-06-13 | End: 2017-06-13 | Stop reason: SURG

## 2017-06-13 RX ORDER — HYDROMORPHONE HYDROCHLORIDE 1 MG/ML
0.5 INJECTION, SOLUTION INTRAMUSCULAR; INTRAVENOUS; SUBCUTANEOUS
Status: DISCONTINUED | OUTPATIENT
Start: 2017-06-13 | End: 2017-06-13 | Stop reason: HOSPADM

## 2017-06-13 RX ORDER — CEFAZOLIN SODIUM 2 G/100ML
2 INJECTION, SOLUTION INTRAVENOUS
Status: DISCONTINUED | OUTPATIENT
Start: 2017-06-13 | End: 2017-06-13 | Stop reason: HOSPADM

## 2017-06-13 RX ORDER — FENTANYL CITRATE 50 UG/ML
50 INJECTION, SOLUTION INTRAMUSCULAR; INTRAVENOUS
Status: DISCONTINUED | OUTPATIENT
Start: 2017-06-13 | End: 2017-06-13 | Stop reason: HOSPADM

## 2017-06-13 RX ORDER — MAGNESIUM HYDROXIDE/ALUMINUM HYDROXICE/SIMETHICONE 120; 1200; 1200 MG/30ML; MG/30ML; MG/30ML
30 SUSPENSION ORAL EVERY 6 HOURS PRN
Status: DISCONTINUED | OUTPATIENT
Start: 2017-06-13 | End: 2017-06-13 | Stop reason: HOSPADM

## 2017-06-13 RX ORDER — LIDOCAINE HYDROCHLORIDE 10 MG/ML
INJECTION, SOLUTION INFILTRATION; PERINEURAL AS NEEDED
Status: DISCONTINUED | OUTPATIENT
Start: 2017-06-13 | End: 2017-06-13 | Stop reason: SURG

## 2017-06-13 RX ORDER — PROPOFOL 10 MG/ML
VIAL (ML) INTRAVENOUS AS NEEDED
Status: DISCONTINUED | OUTPATIENT
Start: 2017-06-13 | End: 2017-06-13 | Stop reason: SURG

## 2017-06-13 RX ORDER — SENNA AND DOCUSATE SODIUM 50; 8.6 MG/1; MG/1
2 TABLET, FILM COATED ORAL NIGHTLY
Status: DISCONTINUED | OUTPATIENT
Start: 2017-06-13 | End: 2017-06-13 | Stop reason: HOSPADM

## 2017-06-13 RX ORDER — ONDANSETRON 2 MG/ML
INJECTION INTRAMUSCULAR; INTRAVENOUS AS NEEDED
Status: DISCONTINUED | OUTPATIENT
Start: 2017-06-13 | End: 2017-06-13 | Stop reason: SURG

## 2017-06-13 RX ORDER — ROCURONIUM BROMIDE 10 MG/ML
INJECTION, SOLUTION INTRAVENOUS AS NEEDED
Status: DISCONTINUED | OUTPATIENT
Start: 2017-06-13 | End: 2017-06-13 | Stop reason: SURG

## 2017-06-13 RX ORDER — MORPHINE SULFATE 4 MG/ML
1 INJECTION, SOLUTION INTRAMUSCULAR; INTRAVENOUS EVERY 4 HOURS PRN
Status: DISCONTINUED | OUTPATIENT
Start: 2017-06-13 | End: 2017-06-13 | Stop reason: HOSPADM

## 2017-06-13 RX ORDER — SODIUM CHLORIDE 0.9 % (FLUSH) 0.9 %
1-10 SYRINGE (ML) INJECTION AS NEEDED
Status: DISCONTINUED | OUTPATIENT
Start: 2017-06-13 | End: 2017-06-13 | Stop reason: HOSPADM

## 2017-06-13 RX ORDER — CLONAZEPAM 0.5 MG/1
0.5 TABLET ORAL NIGHTLY PRN
COMMUNITY
End: 2017-08-30

## 2017-06-13 RX ORDER — MILRINONE LACTATE 0.2 MG/ML
.25-.75 INJECTION, SOLUTION INTRAVENOUS ONCE
Status: DISCONTINUED | OUTPATIENT
Start: 2017-06-13 | End: 2017-06-13 | Stop reason: HOSPADM

## 2017-06-13 RX ORDER — SODIUM CHLORIDE 9 MG/ML
125 INJECTION, SOLUTION INTRAVENOUS CONTINUOUS
Status: DISCONTINUED | OUTPATIENT
Start: 2017-06-13 | End: 2017-06-13 | Stop reason: HOSPADM

## 2017-06-13 RX ORDER — ACETAMINOPHEN 325 MG/1
650 TABLET ORAL EVERY 4 HOURS PRN
Status: DISCONTINUED | OUTPATIENT
Start: 2017-06-13 | End: 2017-06-13 | Stop reason: HOSPADM

## 2017-06-13 RX ORDER — HEPARIN SODIUM 1000 [USP'U]/ML
INJECTION, SOLUTION INTRAVENOUS; SUBCUTANEOUS AS NEEDED
Status: DISCONTINUED | OUTPATIENT
Start: 2017-06-13 | End: 2017-06-13 | Stop reason: SURG

## 2017-06-13 RX ORDER — NALOXONE HCL 0.4 MG/ML
0.4 VIAL (ML) INJECTION
Status: DISCONTINUED | OUTPATIENT
Start: 2017-06-13 | End: 2017-06-13 | Stop reason: HOSPADM

## 2017-06-13 RX ORDER — ONDANSETRON 2 MG/ML
4 INJECTION INTRAMUSCULAR; INTRAVENOUS ONCE AS NEEDED
Status: DISCONTINUED | OUTPATIENT
Start: 2017-06-13 | End: 2017-06-13 | Stop reason: HOSPADM

## 2017-06-13 RX ORDER — HYDROCODONE BITARTRATE AND ACETAMINOPHEN 7.5; 325 MG/1; MG/1
1 TABLET ORAL EVERY 4 HOURS PRN
Status: DISCONTINUED | OUTPATIENT
Start: 2017-06-13 | End: 2017-06-13 | Stop reason: HOSPADM

## 2017-06-13 RX ORDER — GLYCOPYRROLATE 0.2 MG/ML
INJECTION INTRAMUSCULAR; INTRAVENOUS AS NEEDED
Status: DISCONTINUED | OUTPATIENT
Start: 2017-06-13 | End: 2017-06-13 | Stop reason: SURG

## 2017-06-13 RX ORDER — ALPRAZOLAM 0.25 MG/1
0.25 TABLET ORAL 3 TIMES DAILY PRN
Status: DISCONTINUED | OUTPATIENT
Start: 2017-06-13 | End: 2017-06-13 | Stop reason: HOSPADM

## 2017-06-13 RX ADMIN — ROCURONIUM BROMIDE 20 MG: 10 INJECTION INTRAVENOUS at 07:58

## 2017-06-13 RX ADMIN — CEFAZOLIN SODIUM 2 G: 2 INJECTION, SOLUTION INTRAVENOUS at 07:55

## 2017-06-13 RX ADMIN — ONDANSETRON 4 MG: 2 INJECTION INTRAMUSCULAR; INTRAVENOUS at 10:48

## 2017-06-13 RX ADMIN — LIDOCAINE HYDROCHLORIDE 50 MG: 10 INJECTION, SOLUTION INFILTRATION; PERINEURAL at 07:52

## 2017-06-13 RX ADMIN — ROCURONIUM BROMIDE 30 MG: 10 INJECTION INTRAVENOUS at 07:52

## 2017-06-13 RX ADMIN — Medication 3 MG: at 10:48

## 2017-06-13 RX ADMIN — PROPOFOL 100 MG: 10 INJECTION, EMULSION INTRAVENOUS at 07:52

## 2017-06-13 RX ADMIN — SODIUM CHLORIDE 125 ML/HR: 9 INJECTION, SOLUTION INTRAVENOUS at 07:02

## 2017-06-13 RX ADMIN — HEPARIN SODIUM 5000 UNITS: 1000 INJECTION, SOLUTION INTRAVENOUS; SUBCUTANEOUS at 10:25

## 2017-06-13 RX ADMIN — EPHEDRINE SULFATE 15 MG: 50 INJECTION INTRAMUSCULAR; INTRAVENOUS; SUBCUTANEOUS at 09:06

## 2017-06-13 RX ADMIN — DEXAMETHASONE SODIUM PHOSPHATE 8 MG: 4 INJECTION, SOLUTION INTRAMUSCULAR; INTRAVENOUS at 08:26

## 2017-06-13 RX ADMIN — HEPARIN SODIUM 3000 UNITS: 1000 INJECTION, SOLUTION INTRAVENOUS; SUBCUTANEOUS at 09:50

## 2017-06-13 RX ADMIN — PHENYLEPHRINE HYDROCHLORIDE 1 MCG/KG/MIN: 10 INJECTION INTRAVENOUS at 09:26

## 2017-06-13 RX ADMIN — ROBINUL 0.4 MG: 0.2 INJECTION INTRAMUSCULAR; INTRAVENOUS at 10:48

## 2017-06-13 RX ADMIN — SODIUM CHLORIDE: 9 INJECTION, SOLUTION INTRAVENOUS at 07:52

## 2017-06-13 RX ADMIN — PHENYLEPHRINE HYDROCHLORIDE 100 MCG: 10 INJECTION INTRAVENOUS at 08:17

## 2017-06-13 RX ADMIN — EPHEDRINE SULFATE 20 MG: 50 INJECTION INTRAMUSCULAR; INTRAVENOUS; SUBCUTANEOUS at 08:24

## 2017-06-13 NOTE — ANESTHESIA PROCEDURE NOTES
Airway  Urgency: elective    Airway not difficult    General Information and Staff    Patient location during procedure: OR  CRNA: SUE BARLOW    Indications and Patient Condition  Indications for airway management: airway protection    Preoxygenated: yes  MILS not maintained throughout  Mask difficulty assessment: 1 - vent by mask    Final Airway Details  Final airway type: endotracheal airway      Successful airway: ETT  Cuffed: yes   Successful intubation technique: direct laryngoscopy  Facilitating devices/methods: intubating stylet  Endotracheal tube insertion site: oral  Blade: Pierce  Blade size: #2  ETT size: 7.0 mm  Cormack-Lehane Classification: grade I - full view of glottis  Placement verified by: chest auscultation and capnometry   Measured from: lips  ETT to lips (cm): 20  Number of attempts at approach: 1    Additional Comments  Negative epigastric sounds, Breath sound equal bilaterally with symmetric chest rise and fall.  No teeth, atraumatic

## 2017-06-13 NOTE — ANESTHESIA PROCEDURE NOTES
Arterial Line    Patient location during procedure: pre-op   Line placed for hemodynamic monitoring.  Performed By   Anesthesiologist: THERON CHIRINOS  Preanesthetic Checklist  Completed: patient identified, site marked, surgical consent, pre-op evaluation, timeout performed, IV checked, risks and benefits discussed and monitors and equipment checked  Arterial Line Prep   Sterile Tech: cap, gloves and sterile barriers  Prep: ChloraPrep  Patient monitoring: blood pressure monitoring, continuous pulse oximetry and EKG  Arterial Line Procedure   Laterality:right  Location:  radial artery  Catheter size: 20 G   Guidance: palpation technique  Number of attempts: 1  Successful placement: yes          Post Assessment   Dressing Type: line sutured, occlusive dressing applied, secured with tape and wrist guard applied.   Complications no  Circ/Move/Sens Assessment: normal and unchanged.   Patient Tolerance: patient tolerated the procedure well with no apparent complications

## 2017-06-13 NOTE — INTERVAL H&P NOTE
H&P reviewed. The patient was examined and there are no changes to the H&P.     Pt here for MitraClip.  Understands benefits and risks and agrees to proceed.    Natasha Hanna MD, FACC

## 2017-06-13 NOTE — ANESTHESIA POSTPROCEDURE EVALUATION
Patient: Marilee Judge    Procedure Summary     Date Anesthesia Start Anesthesia Stop Room / Location    06/13/17 0740 1111 [facility] / [facility] LOCATION       Procedure Diagnosis Provider Provider    MitraClip (N/A ) Severe mitral regurgitation  (MR) MD Enio Farah MD          Anesthesia Type: general  Last vitals  BP      Temp      Pulse     Resp      SpO2        Post Anesthesia Care and Evaluation    Patient location during evaluation: PACU  Patient participation: complete - patient participated  Level of consciousness: awake and alert  Pain score: 0  Pain management: adequate  Airway patency: patent  Anesthetic complications: No anesthetic complications  PONV Status: none  Cardiovascular status: hemodynamically stable and acceptable  Respiratory status: nonlabored ventilation, acceptable and nasal cannula  Hydration status: acceptable

## 2017-06-13 NOTE — PLAN OF CARE
Problem: Patient Care Overview (Adult)  Goal: Plan of Care Review  Outcome: Outcome(s) achieved Date Met:  06/13/17 06/13/17 8732   Coping/Psychosocial Response Interventions   Plan Of Care Reviewed With patient   Patient Care Overview   Progress no change   Outcome Evaluation   Outcome Summary/Follow up Plan Patient's site stable at time of discharge. The patient is being discharged home with family.        Goal: Adult Individualization and Mutuality  Outcome: Outcome(s) achieved Date Met:  06/13/17  Goal: Discharge Needs Assessment  Outcome: Outcome(s) achieved Date Met:  06/13/17    Problem: Cardiac Catheterization with/without PCI (Adult)  Goal: Signs and Symptoms of Listed Potential Problems Will be Absent or Manageable (Cardiac Catheterization with/without PCI)  Outcome: Outcome(s) achieved Date Met:  06/13/17

## 2017-06-13 NOTE — ANESTHESIA PROCEDURE NOTES
Central Line    Patient location during procedure: OR  Indications: vascular access  Staff  Anesthesiologist: THERON CHIRINOS  CRNA: DOLLAR, SUE  Preanesthetic Checklist  Completed: patient identified, site marked, surgical consent, pre-op evaluation, timeout performed, IV checked, risks and benefits discussed and monitors and equipment checked  Central Line Prep  Sterile Tech:cap, gloves, gown, mask and sterile barriers  Prep: chloraprep  Patient monitoring: blood pressure monitoring, continuous pulse oximetry and EKG  Central Line Procedure  Laterality:right  Location:internal jugular  Catheter Type:Cordis  Catheter Size:9 Fr  Guidance:ultrasound guided  PROCEDURE NOTE/ULTRASOUND INTERPRETATION.  Using ultrasound guidance the potential vascular sites for insertion of the catheter were visualized to determine the patency of the vessel to be used for vascular access.  After selecting the appropriate site for insertion, the needle was visualized under ultrasound being inserted into the internal jugular vein, followed by ultrasound confirmation of wire and catheter placement. There were no abnormalities seen on ultrasound; an image was taken; and the patient tolerated the procedure with no complications.   Assessment  Post procedure:biopatch applied, line sutured, occlusive dressing applied and secured with tape  Assessement:blood return through all ports, free fluid flow and chest x-ray ordered  Complications:no  Patient Tolerance:patient tolerated the procedure well with no apparent complications

## 2017-06-13 NOTE — ANESTHESIA PREPROCEDURE EVALUATION
Anesthesia Evaluation     Nursing notes reviewed   NPO Solid Status: > 8 hours       Airway   Mallampati: II  TM distance: >3 FB  Neck ROM: full  Dental    (+) upper dentures    Pulmonary    (+) rales,   Cardiovascular     Rhythm: regular  Rate: normal        Neuro/Psych  GI/Hepatic/Renal/Endo      Musculoskeletal     Abdominal    Substance History      OB/GYN          Other                                      Anesthesia Plan    ASA 3     general     intravenous induction   Anesthetic plan and risks discussed with patient.    Plan discussed with CRNA.    A line central line

## 2017-06-13 NOTE — H&P (VIEW-ONLY)
Marilee Judge  6/3/1930  [phone]      05/15/2017    Jw Leung DO    Chief Complaint   Patient presents with   • Atrial Fibrillation   • Hypertension   • Chest Pain   • Shortness of Breath     Problem List  1. Atrial fibrillation/sick sinus syndrome:  a. 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  b. Status post dual-chamber Medtronic N Rhythm pacemaker, 09/08/2005 (implanted on the right side).  c. Rythmol therapy since, 09/08/2005.  d. Chronic anticoagulation with Coumadin with a CHADS score = 2.  e. Normal LV function and left atrial dimension by echocardiogram, July 2005.  f. Negative exercise Cardiolite, January 2008; negative adenosine Cardiolite, June 2005; negative stress echocardiogram, April 2004.  g. First-degree AV block.  h. Generator change, September 2011, in [place], [place].  2. Valvular Heart Disease  a. Echocardiogram 4/15/17:  Moderate to sever MR and TR.  No MVP.  Normal LV function.  3. Hypertension.  4. Hyperlipidemia.  5. Hypothyroidism, on chronic replacement therapy.  6. History of breast cancer, status post left mastectomy, 1989.  7. Osteoporosis.  8. Migraine headaches.  9. History of bleeding ulcer 15-20 years ago.  10. Gastroesophageal reflux disease.  11. Restless legs syndrome.  12. History of Helicobacter pylori, 2011.  13. Surgical history:  a. Hysterectomy, 1968.  b. Left mastectomy, 1989.  c. Colon resection for diverticulosis, 08/17/2013, Dr. Guadarrama at [facility].  d. Cholecystectomy.     Allergies   Allergen Reactions   • Doxycycline    • Fosamax [Alendronate] GI Intolerance   • Metronidazole        Current Medications    Current Outpatient Prescriptions:   •  clonazePAM (KlonoPIN) 0.5 MG tablet, Take 0.5 mg by mouth daily., Disp: , Rfl:   •  colestipol (COLESTID) 1 G tablet, Take 1 g by mouth 2 (two) times a day., Disp: , Rfl:   •  dicyclomine (BENTYL) 10 MG capsule, Take 10 mg by mouth 4 (four) times a day before meals and  nightly., Disp: , Rfl:   •  diphenoxylate-atropine (LOMOTIL) 2.5-0.025 MG per tablet, Take 1 tablet by mouth 4 (four) times a day as needed for diarrhea., Disp: , Rfl:   •  gabapentin (NEURONTIN) 100 MG capsule, Take 100 mg by mouth daily as needed., Disp: , Rfl:   •  HYDROcodone-acetaminophen (NORCO) 5-325 MG per tablet, Take 1 tablet by mouth every 6 (six) hours as needed., Disp: , Rfl:   •  levothyroxine (SYNTHROID, LEVOTHROID) 150 MCG tablet, Take 150 mcg by mouth daily., Disp: , Rfl:   •  meloxicam (MOBIC) 7.5 MG tablet, Take 7.5 mg by mouth daily., Disp: , Rfl:   •  metoprolol succinate XL (TOPROL-XL) 25 MG 24 hr tablet, TAKE 1 TABLET DAILY, Disp: 90 tablet, Rfl: 2  •  nortriptyline (PAMELOR) 25 MG capsule, Take 25 mg by mouth every night., Disp: , Rfl:   •  pravastatin (PRAVACHOL) 40 MG tablet, Take 40 mg by mouth daily., Disp: , Rfl:   •  propafenone (RYTHMOL) 225 MG tablet, Take 1 tablet by mouth Every 8 (Eight) Hours. Dose change as of 12/09/2016, Disp: 270 tablet, Rfl: 2  •  propafenone SR (RYTHMOL SR) 225 MG 12 hr capsule, TAKE 1 CAPSULE EVERY 12 HOURS, Disp: 180 capsule, Rfl: 2  •  traMADol (ULTRAM) 50 MG tablet, Take 50 mg by mouth every 6 (six) hours as needed for moderate pain (4-6)., Disp: , Rfl:   •  warfarin (COUMADIN) 1 MG tablet, Take 1 mg by mouth daily. As directed, Disp: , Rfl:   •  warfarin (COUMADIN) 2 MG tablet, Take 2 mg by mouth daily. As directed, Disp: , Rfl:     History of Present Illness   HPI  Patient is a pleasant 86-year-old female with a known history of atrial fibrillation sick sinus syndrome and valvular heart disease who presents today in clinic to further discuss mitral clip options.  She states that since we saw her last in August 2016 she had done well up until a couple of months ago.  It was at that time she began to notice that she was increasingly more short of breath and that it seems to be worse when laying on her left side.  She states that when she walks up a flight  "of stairs, she has to sit down to catch her breath.  She also admits to fleeting chest pains that can occur with or without exertion; there is no obvious trigger and they are self limiting.      The following portions of the patient's history were reviewed and updated as appropriate: allergies, current medications and problem list.    Pertinent positives as listed in the HPI.  All other systems reviewed are negative.    Vitals:    05/15/17 1450   BP: 130/72   BP Location: Right arm   Patient Position: Sitting   Pulse: 78   Weight: 108 lb 12.8 oz (49.4 kg)   Height: 66\" (167.6 cm)       Physical Exam  GENERAL: well-developed, well-nourished; in no acute distress.   NECK:  Carotid upstrokes are 2+ and  symmetrical without bruits.   LUNGS: Clear to auscultation bilaterally without wheezing, rhonchi, or rales noted.   CARDIOVASCULAR: The heart has a regular rate with a normal S1 and S2. There is no murmur, gallop, rub, or click appreciated. The PMI is nondisplaced.   ABDOMEN: Soft and nontender  NEUROLOGICAL: Nonfocal; Alert and oriented  PERIPHERAL VASCULAR:  Posterior tibial and dorsalis pedis pulses are 2+ and symmetrical. There is no peripheral edema.     Diagnostic Data:    ECG 12 Lead  Date/Time: 5/15/2017 4:12 PM  Performed by: FAVIAN VEGA  Authorized by: FAVIAN VEGA   BPM: 78  Clinical impression: abnormal ECG  Comments: Atrial paced with prolonged AV conduction  Nonspecific ST flattening  Septal infarct        DEVICE INTERROGATION: 5/15 /201, Medtronic PPM [id number]: RA pacing 67%, RV pacing <0.1%. P wave is 4.6-5.0 mV with a threshold of 0.5 V at 0.4 msec and an impedance of 382 ohms. R wave is 15.68-22.40 mV with a threshold of 1.5 V at 0.4 msec and an impedance of 897 ohms. Battery voltage is 2.78 with a longevity of 5 years.  She has 0.7% mode switching.  Her longest afib episode was 1 hour and 8 minutes.  .    Assessment:      ICD-10-CM ICD-9-CM   1. Valvular heart disease I38 424.90   2. Essential " hypertension I10 401.9   3. Mixed hyperlipidemia E78.2 272.2   4. Chronic atrial fibrillation I48.2 427.31   5. Dyspnea on exertion R06.09 786.09       Plan:  1.  Left heart catheterization.  The risks, benefits, and potential complications have all been discussed and she is willing to proceed.  Groin access will be utilized.   2.  F/up after her heart cath.    Scribed for Natasha Hanna MD by LUCI Reich on 05/15/2017 at 3:30 PM         I Natasha Hanna MD personally performed the services described in this documentation as scribed by the above individual in my presence, and it is both accurate and complete.    Natasha Hanna MD, FACC

## 2017-06-15 LAB
ACT BLD: 162 SECONDS (ref 82–152)
ACT BLD: 183 SECONDS (ref 82–152)
ACT BLD: 193 SECONDS (ref 82–152)
ACT BLD: 209 SECONDS (ref 82–152)

## 2017-08-30 ENCOUNTER — OFFICE VISIT (OUTPATIENT)
Dept: CARDIOLOGY | Facility: CLINIC | Age: 82
End: 2017-08-30

## 2017-08-30 VITALS
HEART RATE: 65 BPM | WEIGHT: 107.6 LBS | HEIGHT: 66 IN | SYSTOLIC BLOOD PRESSURE: 134 MMHG | BODY MASS INDEX: 17.29 KG/M2 | DIASTOLIC BLOOD PRESSURE: 80 MMHG

## 2017-08-30 DIAGNOSIS — I10 ESSENTIAL HYPERTENSION: ICD-10-CM

## 2017-08-30 DIAGNOSIS — I48.20 CHRONIC ATRIAL FIBRILLATION (HCC): ICD-10-CM

## 2017-08-30 DIAGNOSIS — I34.2 NONRHEUMATIC MITRAL VALVE STENOSIS WITH INSUFFICIENCY: Primary | ICD-10-CM

## 2017-08-30 DIAGNOSIS — I34.0 NONRHEUMATIC MITRAL VALVE STENOSIS WITH INSUFFICIENCY: Primary | ICD-10-CM

## 2017-08-30 DIAGNOSIS — E78.2 MIXED HYPERLIPIDEMIA: ICD-10-CM

## 2017-08-30 PROBLEM — I05.2 MITRAL STENOSIS WITH REGURGITATION: Status: ACTIVE | Noted: 2017-08-30

## 2017-08-30 PROCEDURE — 99213 OFFICE O/P EST LOW 20 MIN: CPT | Performed by: INTERNAL MEDICINE

## 2017-08-30 PROCEDURE — 93000 ELECTROCARDIOGRAM COMPLETE: CPT | Performed by: INTERNAL MEDICINE

## 2017-08-30 RX ORDER — LORATADINE 10 MG/1
10 CAPSULE, LIQUID FILLED ORAL EVERY MORNING
COMMUNITY
End: 2018-01-01

## 2017-08-30 RX ORDER — ROPINIROLE 1 MG/1
1 TABLET, FILM COATED ORAL 3 TIMES DAILY
COMMUNITY

## 2017-09-08 ENCOUNTER — PREP FOR SURGERY (OUTPATIENT)
Dept: OTHER | Facility: HOSPITAL | Age: 82
End: 2017-09-08

## 2017-09-08 DIAGNOSIS — I34.2 NON-RHEUMATIC MITRAL VALVE STENOSIS: Primary | ICD-10-CM

## 2017-09-08 RX ORDER — CEFAZOLIN SODIUM 2 G/100ML
2 INJECTION, SOLUTION INTRAVENOUS
Status: CANCELLED | OUTPATIENT
Start: 2017-09-08

## 2017-09-08 RX ORDER — SODIUM CHLORIDE 0.9 % (FLUSH) 0.9 %
1-10 SYRINGE (ML) INJECTION AS NEEDED
Status: CANCELLED | OUTPATIENT
Start: 2017-09-08

## 2017-09-11 ENCOUNTER — APPOINTMENT (OUTPATIENT)
Dept: PREADMISSION TESTING | Facility: HOSPITAL | Age: 82
End: 2017-09-11

## 2017-09-11 DIAGNOSIS — I34.2 NON-RHEUMATIC MITRAL VALVE STENOSIS: ICD-10-CM

## 2017-09-11 LAB
ALBUMIN SERPL-MCNC: 4 G/DL (ref 3.2–4.8)
ALBUMIN/GLOB SERPL: 1.4 G/DL (ref 1.5–2.5)
ALP SERPL-CCNC: 65 U/L (ref 25–100)
ALT SERPL W P-5'-P-CCNC: 26 U/L (ref 7–40)
ANION GAP SERPL CALCULATED.3IONS-SCNC: 9 MMOL/L (ref 3–11)
AST SERPL-CCNC: 36 U/L (ref 0–33)
BILIRUB SERPL-MCNC: 0.5 MG/DL (ref 0.3–1.2)
BUN BLD-MCNC: 22 MG/DL (ref 9–23)
BUN/CREAT SERPL: 27.5 (ref 7–25)
CALCIUM SPEC-SCNC: 9.1 MG/DL (ref 8.7–10.4)
CHLORIDE SERPL-SCNC: 104 MMOL/L (ref 99–109)
CO2 SERPL-SCNC: 31 MMOL/L (ref 20–31)
CREAT BLD-MCNC: 0.8 MG/DL (ref 0.6–1.3)
DEPRECATED RDW RBC AUTO: 51.8 FL (ref 37–54)
ERYTHROCYTE [DISTWIDTH] IN BLOOD BY AUTOMATED COUNT: 14.5 % (ref 11.3–14.5)
GFR SERPL CREATININE-BSD FRML MDRD: 68 ML/MIN/1.73
GLOBULIN UR ELPH-MCNC: 2.9 GM/DL
GLUCOSE BLD-MCNC: 103 MG/DL (ref 70–100)
HCT VFR BLD AUTO: 40.7 % (ref 34.5–44)
HGB BLD-MCNC: 12.8 G/DL (ref 11.5–15.5)
MCH RBC QN AUTO: 30.5 PG (ref 27–31)
MCHC RBC AUTO-ENTMCNC: 31.4 G/DL (ref 32–36)
MCV RBC AUTO: 97.1 FL (ref 80–99)
PLATELET # BLD AUTO: 157 10*3/MM3 (ref 150–450)
PMV BLD AUTO: 10.5 FL (ref 6–12)
POTASSIUM BLD-SCNC: 4.4 MMOL/L (ref 3.5–5.5)
PROT SERPL-MCNC: 6.9 G/DL (ref 5.7–8.2)
RBC # BLD AUTO: 4.19 10*6/MM3 (ref 3.89–5.14)
SODIUM BLD-SCNC: 144 MMOL/L (ref 132–146)
WBC NRBC COR # BLD: 6.37 10*3/MM3 (ref 3.5–10.8)

## 2017-09-11 NOTE — PAT
Patient to apply Chlorhexadine wipes  to surgical area (as instructed) the night before procedure and the AM of procedure. Wipes provided.

## 2017-09-11 NOTE — DISCHARGE INSTRUCTIONS
The following instructions given during Pre Admission Testing visit:    Do not eat or drink anything after MN except for sips of water with your a.m. Prescription meds unless otherwise instructed by your physician.    Glasses and jewelry may be worn, but dentures must be removed prior to cath/procedure.    Leave any items you consider valuable at home.    Family members may wait in [facility] waiting area during procedure.    Bring all medications in their original containers the day of procedure.    Bring photo ID and insurance cards on the day of procedure.    Need to make arrangements for transportation prior to discharge.    The following handouts were given:     Heart Cath pathway (if applicable)   Cardiac Cath booklet published by [facility]    OR appropriate [facility] procedure booklet    If applicable, pt instructed to bring CPAP mask and tubing the day of procedure.

## 2017-09-12 ENCOUNTER — ANESTHESIA EVENT (OUTPATIENT)
Dept: CARDIOLOGY | Facility: HOSPITAL | Age: 82
End: 2017-09-12

## 2017-09-12 ENCOUNTER — OFFICE VISIT (OUTPATIENT)
Dept: PERIOP | Facility: HOSPITAL | Age: 82
End: 2017-09-12
Attending: INTERNAL MEDICINE

## 2017-09-12 ENCOUNTER — HOSPITAL ENCOUNTER (INPATIENT)
Facility: HOSPITAL | Age: 82
LOS: 2 days | Discharge: HOME | End: 2017-09-14
Attending: INTERNAL MEDICINE | Admitting: INTERNAL MEDICINE

## 2017-09-12 ENCOUNTER — ANESTHESIA (OUTPATIENT)
Dept: CARDIOLOGY | Facility: HOSPITAL | Age: 82
End: 2017-09-12

## 2017-09-12 ENCOUNTER — APPOINTMENT (OUTPATIENT)
Dept: GENERAL RADIOLOGY | Facility: HOSPITAL | Age: 82
End: 2017-09-12

## 2017-09-12 DIAGNOSIS — I05.2: ICD-10-CM

## 2017-09-12 DIAGNOSIS — I34.0 MITRAL VALVE INSUFFICIENCY, UNSPECIFIED ETIOLOGY: ICD-10-CM

## 2017-09-12 DIAGNOSIS — I34.2 NON-RHEUMATIC MITRAL VALVE STENOSIS: ICD-10-CM

## 2017-09-12 PROBLEM — J95.821 RESPIRATORY FAILURE, POST-OPERATIVE (HCC): Status: ACTIVE | Noted: 2017-09-12

## 2017-09-12 PROBLEM — I30.9 ACUTE PERICARDIAL EFFUSION: Status: ACTIVE | Noted: 2017-09-12

## 2017-09-12 PROBLEM — I38 VALVULAR HEART DISEASE: Status: RESOLVED | Noted: 2017-05-15 | Resolved: 2017-09-12

## 2017-09-12 LAB
ABO GROUP BLD: NORMAL
ABO GROUP BLD: NORMAL
ACT BLD: 142 SECONDS (ref 82–152)
ACT BLD: 356 SECONDS (ref 82–152)
ACT BLD: 367 SECONDS (ref 82–152)
ACT BLD: 472 SECONDS (ref 82–152)
ACT BLD: 494 SECONDS (ref 82–152)
ARTERIAL PATENCY WRIST A: ABNORMAL
ARTERIAL PATENCY WRIST A: ABNORMAL
ATMOSPHERIC PRESS: ABNORMAL MMHG
ATMOSPHERIC PRESS: ABNORMAL MMHG
BASE EXCESS BLDA CALC-SCNC: 2.4 MMOL/L (ref 0–2)
BASE EXCESS BLDA CALC-SCNC: 2.8 MMOL/L (ref 0–2)
BDY SITE: ABNORMAL
BDY SITE: ABNORMAL
BH CV ECHO MEAS - MV MAX PG: 19 MMHG
BH CV ECHO MEAS - MV MEAN PG: 8.6 MMHG
BH CV ECHO MEAS - MV V2 MAX: 216.6 CM/SEC
BH CV ECHO MEAS - MV V2 MEAN: 137 CM/SEC
BH CV ECHO MEAS - MV V2 VTI: 71.4 CM
BILIRUB UR QL STRIP: NEGATIVE
BLD GP AB SCN SERPL QL: NEGATIVE
CLARITY UR: CLEAR
CO2 BLDA-SCNC: 27.6 MMOL/L (ref 22–33)
CO2 BLDA-SCNC: 28.9 MMOL/L (ref 22–33)
COHGB MFR BLD: 0.5 % (ref 0–2)
COHGB MFR BLD: 0.5 % (ref 0–2)
COLOR UR: YELLOW
DEPRECATED RDW RBC AUTO: 49.7 FL (ref 37–54)
ERYTHROCYTE [DISTWIDTH] IN BLOOD BY AUTOMATED COUNT: 14.4 % (ref 11.3–14.5)
GLUCOSE UR STRIP-MCNC: NEGATIVE MG/DL
HCO3 BLDA-SCNC: 26.5 MMOL/L (ref 20–26)
HCO3 BLDA-SCNC: 27.6 MMOL/L (ref 20–26)
HCT VFR BLD AUTO: 30 % (ref 34.5–44)
HCT VFR BLD CALC: 32.7 %
HCT VFR BLD CALC: 34.7 %
HGB BLD-MCNC: 9.4 G/DL (ref 11.5–15.5)
HGB BLDA-MCNC: 10.7 G/DL (ref 14–18)
HGB BLDA-MCNC: 11.3 G/DL (ref 14–18)
HGB UR QL STRIP.AUTO: NEGATIVE
HOROWITZ INDEX BLD+IHG-RTO: 40 %
HOROWITZ INDEX BLD+IHG-RTO: 50 %
INR PPP: 1.75
INR PPP: 1.88
KETONES UR QL STRIP: NEGATIVE
LEUKOCYTE ESTERASE UR QL STRIP.AUTO: NEGATIVE
MCH RBC QN AUTO: 29.9 PG (ref 27–31)
MCHC RBC AUTO-ENTMCNC: 31.3 G/DL (ref 32–36)
MCV RBC AUTO: 95.5 FL (ref 80–99)
METHGB BLD QL: 1.1 % (ref 0–1.5)
METHGB BLD QL: 1.2 % (ref 0–1.5)
MODALITY: ABNORMAL
MODALITY: ABNORMAL
NITRITE UR QL STRIP: NEGATIVE
OXYHGB MFR BLDV: 97.2 % (ref 94–99)
OXYHGB MFR BLDV: 97.8 % (ref 94–99)
PCO2 BLDA: 37.9 MM HG (ref 35–45)
PCO2 BLDA: 42.4 MM HG (ref 35–45)
PH BLDA: 7.42 PH UNITS (ref 7.35–7.45)
PH BLDA: 7.45 PH UNITS (ref 7.35–7.45)
PH UR STRIP.AUTO: 6.5 [PH] (ref 5–8)
PLATELET # BLD AUTO: 118 10*3/MM3 (ref 150–450)
PMV BLD AUTO: 9.8 FL (ref 6–12)
PO2 BLDA: 126 MM HG (ref 83–108)
PO2 BLDA: 164 MM HG (ref 83–108)
PROT UR QL STRIP: ABNORMAL
PROTHROMBIN TIME: 19.4 SECONDS (ref 9.6–11.5)
PROTHROMBIN TIME: 20.9 SECONDS (ref 9.6–11.5)
RBC # BLD AUTO: 3.14 10*6/MM3 (ref 3.89–5.14)
RH BLD: POSITIVE
RH BLD: POSITIVE
SP GR UR STRIP: 1.01 (ref 1–1.03)
UROBILINOGEN UR QL STRIP: ABNORMAL
WBC NRBC COR # BLD: 6.12 10*3/MM3 (ref 3.5–10.8)

## 2017-09-12 PROCEDURE — 94799 UNLISTED PULMONARY SVC/PX: CPT

## 2017-09-12 PROCEDURE — 33010 HC PERICARDIOCENTESIS INITIAL: CPT | Performed by: INTERNAL MEDICINE

## 2017-09-12 PROCEDURE — P9041 ALBUMIN (HUMAN),5%, 50ML: HCPCS

## 2017-09-12 PROCEDURE — 33010 PR PERICARDIOCENTESIS INITIAL: CPT | Performed by: INTERNAL MEDICINE

## 2017-09-12 PROCEDURE — 94760 N-INVAS EAR/PLS OXIMETRY 1: CPT

## 2017-09-12 PROCEDURE — 82805 BLOOD GASES W/O2 SATURATION: CPT | Performed by: INTERNAL MEDICINE

## 2017-09-12 PROCEDURE — C1894 INTRO/SHEATH, NON-LASER: HCPCS | Performed by: INTERNAL MEDICINE

## 2017-09-12 PROCEDURE — 25010000002 HEPARIN (PORCINE) PER 1000 UNITS: Performed by: NURSE ANESTHETIST, CERTIFIED REGISTERED

## 2017-09-12 PROCEDURE — 25010000002 PROPOFOL 10 MG/ML EMULSION

## 2017-09-12 PROCEDURE — C1751 CATH, INF, PER/CENT/MIDLINE: HCPCS | Performed by: ANESTHESIOLOGY

## 2017-09-12 PROCEDURE — 02UG3JZ SUPPLEMENT MITRAL VALVE WITH SYNTHETIC SUBSTITUTE, PERCUTANEOUS APPROACH: ICD-10-PCS | Performed by: INTERNAL MEDICINE

## 2017-09-12 PROCEDURE — 85027 COMPLETE CBC AUTOMATED: CPT | Performed by: INTERNAL MEDICINE

## 2017-09-12 PROCEDURE — 87086 URINE CULTURE/COLONY COUNT: CPT | Performed by: INTERNAL MEDICINE

## 2017-09-12 PROCEDURE — 25010000003 CEFAZOLIN IN DEXTROSE 2-4 GM/100ML-% SOLUTION: Performed by: NURSE PRACTITIONER

## 2017-09-12 PROCEDURE — 86900 BLOOD TYPING SEROLOGIC ABO: CPT

## 2017-09-12 PROCEDURE — 85610 PROTHROMBIN TIME: CPT | Performed by: INTERNAL MEDICINE

## 2017-09-12 PROCEDURE — 71010 HC CHEST PA OR AP: CPT

## 2017-09-12 PROCEDURE — 33418 REPAIR TCAT MITRAL VALVE: CPT | Performed by: INTERNAL MEDICINE

## 2017-09-12 PROCEDURE — 36415 COLL VENOUS BLD VENIPUNCTURE: CPT

## 2017-09-12 PROCEDURE — 99291 CRITICAL CARE FIRST HOUR: CPT | Performed by: INTERNAL MEDICINE

## 2017-09-12 PROCEDURE — B24BZZ4 ULTRASONOGRAPHY OF HEART WITH AORTA, TRANSESOPHAGEAL: ICD-10-PCS | Performed by: INTERNAL MEDICINE

## 2017-09-12 PROCEDURE — 25010000002 DEXAMETHASONE PER 1 MG: Performed by: INTERNAL MEDICINE

## 2017-09-12 PROCEDURE — 25010000002 ALBUMIN HUMAN 5% PER 50 ML

## 2017-09-12 PROCEDURE — 25010000002 FUROSEMIDE PER 20 MG: Performed by: INTERNAL MEDICINE

## 2017-09-12 PROCEDURE — 93662 INTRACARDIAC ECG (ICE): CPT | Performed by: INTERNAL MEDICINE

## 2017-09-12 PROCEDURE — 86900 BLOOD TYPING SEROLOGIC ABO: CPT | Performed by: INTERNAL MEDICINE

## 2017-09-12 PROCEDURE — 86850 RBC ANTIBODY SCREEN: CPT | Performed by: INTERNAL MEDICINE

## 2017-09-12 PROCEDURE — C1759 CATH, INTRA ECHOCARDIOGRAPHY: HCPCS | Performed by: INTERNAL MEDICINE

## 2017-09-12 PROCEDURE — 25010000002 PHENYLEPHRINE PER 1 ML: Performed by: NURSE ANESTHETIST, CERTIFIED REGISTERED

## 2017-09-12 PROCEDURE — 86901 BLOOD TYPING SEROLOGIC RH(D): CPT

## 2017-09-12 PROCEDURE — C1893 INTRO/SHEATH, FIXED,NON-PEEL: HCPCS | Performed by: INTERNAL MEDICINE

## 2017-09-12 PROCEDURE — 85347 COAGULATION TIME ACTIVATED: CPT

## 2017-09-12 PROCEDURE — 0W9D30Z DRAINAGE OF PERICARDIAL CAVITY WITH DRAINAGE DEVICE, PERCUTANEOUS APPROACH: ICD-10-PCS | Performed by: INTERNAL MEDICINE

## 2017-09-12 PROCEDURE — 25010000002 PROTAMINE SULFATE PER 10 MG: Performed by: NURSE ANESTHETIST, CERTIFIED REGISTERED

## 2017-09-12 PROCEDURE — 25010000002 HYDRALAZINE PER 20 MG: Performed by: NURSE ANESTHETIST, CERTIFIED REGISTERED

## 2017-09-12 PROCEDURE — 25010000002 MORPHINE SULFATE (PF) 2 MG/ML SOLUTION: Performed by: INTERNAL MEDICINE

## 2017-09-12 PROCEDURE — 93355 ECHO TRANSESOPHAGEAL (TEE): CPT

## 2017-09-12 PROCEDURE — 94002 VENT MGMT INPAT INIT DAY: CPT

## 2017-09-12 PROCEDURE — 25010000002 FENTANYL CITRATE (PF) 100 MCG/2ML SOLUTION

## 2017-09-12 PROCEDURE — 81003 URINALYSIS AUTO W/O SCOPE: CPT | Performed by: INTERNAL MEDICINE

## 2017-09-12 PROCEDURE — 25010000002 DEXAMETHASONE PER 1 MG: Performed by: NURSE ANESTHETIST, CERTIFIED REGISTERED

## 2017-09-12 PROCEDURE — 86901 BLOOD TYPING SEROLOGIC RH(D): CPT | Performed by: INTERNAL MEDICINE

## 2017-09-12 PROCEDURE — 25010000002 PROPOFOL 10 MG/ML EMULSION: Performed by: NURSE ANESTHETIST, CERTIFIED REGISTERED

## 2017-09-12 RX ORDER — FENTANYL CITRATE 50 UG/ML
50 INJECTION, SOLUTION INTRAMUSCULAR; INTRAVENOUS ONCE
Status: DISCONTINUED | OUTPATIENT
Start: 2017-09-12 | End: 2017-09-14 | Stop reason: HOSPADM

## 2017-09-12 RX ORDER — METOPROLOL SUCCINATE 25 MG/1
25 TABLET, EXTENDED RELEASE ORAL EVERY MORNING
Status: DISCONTINUED | OUTPATIENT
Start: 2017-09-12 | End: 2017-09-12

## 2017-09-12 RX ORDER — WARFARIN SODIUM 3 MG/1
1.5 TABLET ORAL
Status: DISCONTINUED | OUTPATIENT
Start: 2017-09-12 | End: 2017-09-12 | Stop reason: SDUPTHER

## 2017-09-12 RX ORDER — ALPRAZOLAM 0.25 MG/1
0.25 TABLET ORAL 3 TIMES DAILY PRN
Status: DISCONTINUED | OUTPATIENT
Start: 2017-09-12 | End: 2017-09-14 | Stop reason: HOSPADM

## 2017-09-12 RX ORDER — ROPINIROLE 0.5 MG/1
1 TABLET, FILM COATED ORAL NIGHTLY
Status: DISCONTINUED | OUTPATIENT
Start: 2017-09-12 | End: 2017-09-13

## 2017-09-12 RX ORDER — SODIUM CHLORIDE 0.9 % (FLUSH) 0.9 %
1-10 SYRINGE (ML) INJECTION AS NEEDED
Status: DISCONTINUED | OUTPATIENT
Start: 2017-09-12 | End: 2017-09-12 | Stop reason: HOSPADM

## 2017-09-12 RX ORDER — FUROSEMIDE 10 MG/ML
20 INJECTION INTRAMUSCULAR; INTRAVENOUS ONCE
Status: COMPLETED | OUTPATIENT
Start: 2017-09-12 | End: 2017-09-12

## 2017-09-12 RX ORDER — SODIUM CHLORIDE 9 MG/ML
50 INJECTION, SOLUTION INTRAVENOUS CONTINUOUS
Status: ACTIVE | OUTPATIENT
Start: 2017-09-12 | End: 2017-09-12

## 2017-09-12 RX ORDER — FENTANYL CITRATE 50 UG/ML
50 INJECTION, SOLUTION INTRAMUSCULAR; INTRAVENOUS
Status: DISCONTINUED | OUTPATIENT
Start: 2017-09-12 | End: 2017-09-12 | Stop reason: HOSPADM

## 2017-09-12 RX ORDER — MORPHINE SULFATE 2 MG/ML
1 INJECTION, SOLUTION INTRAMUSCULAR; INTRAVENOUS EVERY 4 HOURS PRN
Status: DISCONTINUED | OUTPATIENT
Start: 2017-09-12 | End: 2017-09-14

## 2017-09-12 RX ORDER — MULTIPLE VITAMINS W/ MINERALS TAB 9MG-400MCG
1 TAB ORAL DAILY
Status: DISCONTINUED | OUTPATIENT
Start: 2017-09-12 | End: 2017-09-14 | Stop reason: HOSPADM

## 2017-09-12 RX ORDER — WARFARIN SODIUM 3 MG/1
1.5 TABLET ORAL
Status: DISCONTINUED | OUTPATIENT
Start: 2017-09-12 | End: 2017-09-14 | Stop reason: HOSPADM

## 2017-09-12 RX ORDER — HEPARIN SODIUM 1000 [USP'U]/ML
INJECTION, SOLUTION INTRAVENOUS; SUBCUTANEOUS AS NEEDED
Status: DISCONTINUED | OUTPATIENT
Start: 2017-09-12 | End: 2017-09-12 | Stop reason: SURG

## 2017-09-12 RX ORDER — POTASSIUM CHLORIDE 29.8 MG/ML
20 INJECTION INTRAVENOUS
Status: DISCONTINUED | OUTPATIENT
Start: 2017-09-12 | End: 2017-09-14 | Stop reason: HOSPADM

## 2017-09-12 RX ORDER — CETIRIZINE HYDROCHLORIDE 10 MG/1
5 TABLET ORAL DAILY
Status: DISCONTINUED | OUTPATIENT
Start: 2017-09-12 | End: 2017-09-14 | Stop reason: HOSPADM

## 2017-09-12 RX ORDER — LANOLIN ALCOHOL/MO/W.PET/CERES
500 CREAM (GRAM) TOPICAL DAILY
Status: DISCONTINUED | OUTPATIENT
Start: 2017-09-12 | End: 2017-09-14 | Stop reason: HOSPADM

## 2017-09-12 RX ORDER — MAGNESIUM SULFATE HEPTAHYDRATE 40 MG/ML
4 INJECTION, SOLUTION INTRAVENOUS AS NEEDED
Status: DISCONTINUED | OUTPATIENT
Start: 2017-09-12 | End: 2017-09-14 | Stop reason: HOSPADM

## 2017-09-12 RX ORDER — CHLORHEXIDINE GLUCONATE 0.12 MG/ML
15 RINSE ORAL EVERY 12 HOURS SCHEDULED
Status: DISCONTINUED | OUTPATIENT
Start: 2017-09-12 | End: 2017-09-14 | Stop reason: HOSPADM

## 2017-09-12 RX ORDER — MAGNESIUM SULFATE HEPTAHYDRATE 40 MG/ML
2 INJECTION, SOLUTION INTRAVENOUS AS NEEDED
Status: DISCONTINUED | OUTPATIENT
Start: 2017-09-12 | End: 2017-09-14 | Stop reason: HOSPADM

## 2017-09-12 RX ORDER — ATRACURIUM BESYLATE 10 MG/ML
INJECTION, SOLUTION INTRAVENOUS AS NEEDED
Status: DISCONTINUED | OUTPATIENT
Start: 2017-09-12 | End: 2017-09-12 | Stop reason: SURG

## 2017-09-12 RX ORDER — FENTANYL CITRATE 50 UG/ML
INJECTION, SOLUTION INTRAMUSCULAR; INTRAVENOUS
Status: COMPLETED
Start: 2017-09-12 | End: 2017-09-12

## 2017-09-12 RX ORDER — MELATONIN
1000 DAILY
Status: DISCONTINUED | OUTPATIENT
Start: 2017-09-12 | End: 2017-09-14 | Stop reason: HOSPADM

## 2017-09-12 RX ORDER — ASPIRIN 81 MG/1
81 TABLET, CHEWABLE ORAL EVERY MORNING
Status: DISCONTINUED | OUTPATIENT
Start: 2017-09-12 | End: 2017-09-14 | Stop reason: HOSPADM

## 2017-09-12 RX ORDER — PROPOFOL 10 MG/ML
VIAL (ML) INTRAVENOUS
Status: COMPLETED
Start: 2017-09-12 | End: 2017-09-12

## 2017-09-12 RX ORDER — PANTOPRAZOLE SODIUM 40 MG/1
40 TABLET, DELAYED RELEASE ORAL EVERY MORNING
Status: DISCONTINUED | OUTPATIENT
Start: 2017-09-12 | End: 2017-09-14 | Stop reason: HOSPADM

## 2017-09-12 RX ORDER — LIDOCAINE HYDROCHLORIDE 10 MG/ML
INJECTION, SOLUTION INFILTRATION; PERINEURAL AS NEEDED
Status: DISCONTINUED | OUTPATIENT
Start: 2017-09-12 | End: 2017-09-12 | Stop reason: SURG

## 2017-09-12 RX ORDER — HYDROCODONE BITARTRATE AND ACETAMINOPHEN 7.5; 325 MG/1; MG/1
1 TABLET ORAL EVERY 4 HOURS PRN
Status: DISCONTINUED | OUTPATIENT
Start: 2017-09-12 | End: 2017-09-14 | Stop reason: HOSPADM

## 2017-09-12 RX ORDER — MINERAL OIL AND WHITE PETROLATUM 150; 830 MG/G; MG/G
OINTMENT OPHTHALMIC
Status: DISCONTINUED | OUTPATIENT
Start: 2017-09-12 | End: 2017-09-14 | Stop reason: HOSPADM

## 2017-09-12 RX ORDER — NITROGLYCERIN 20 MG/100ML
INJECTION INTRAVENOUS CONTINUOUS PRN
Status: DISCONTINUED | OUTPATIENT
Start: 2017-09-12 | End: 2017-09-12 | Stop reason: SURG

## 2017-09-12 RX ORDER — MELOXICAM 7.5 MG/1
7.5 TABLET ORAL NIGHTLY
Status: DISCONTINUED | OUTPATIENT
Start: 2017-09-12 | End: 2017-09-14 | Stop reason: HOSPADM

## 2017-09-12 RX ORDER — CEFAZOLIN SODIUM 2 G/100ML
2 INJECTION, SOLUTION INTRAVENOUS
Status: DISCONTINUED | OUTPATIENT
Start: 2017-09-12 | End: 2017-09-12 | Stop reason: HOSPADM

## 2017-09-12 RX ORDER — WARFARIN SODIUM 3 MG/1
3 TABLET ORAL
Status: DISCONTINUED | OUTPATIENT
Start: 2017-09-13 | End: 2017-09-14 | Stop reason: HOSPADM

## 2017-09-12 RX ORDER — HYDROCODONE BITARTRATE AND ACETAMINOPHEN 5; 325 MG/1; MG/1
1 TABLET ORAL EVERY 6 HOURS PRN
Status: DISCONTINUED | OUTPATIENT
Start: 2017-09-12 | End: 2017-09-14 | Stop reason: HOSPADM

## 2017-09-12 RX ORDER — SENNA AND DOCUSATE SODIUM 50; 8.6 MG/1; MG/1
2 TABLET, FILM COATED ORAL NIGHTLY
Status: DISCONTINUED | OUTPATIENT
Start: 2017-09-12 | End: 2017-09-14 | Stop reason: HOSPADM

## 2017-09-12 RX ORDER — PHENYLEPHRINE HCL IN 0.9% NACL 0.5 MG/5ML
.5-3 SYRINGE (ML) INTRAVENOUS
Status: DISCONTINUED | OUTPATIENT
Start: 2017-09-12 | End: 2017-09-14 | Stop reason: HOSPADM

## 2017-09-12 RX ORDER — ONDANSETRON 2 MG/ML
4 INJECTION INTRAMUSCULAR; INTRAVENOUS ONCE AS NEEDED
Status: DISCONTINUED | OUTPATIENT
Start: 2017-09-12 | End: 2017-09-12 | Stop reason: HOSPADM

## 2017-09-12 RX ORDER — NITROGLYCERIN 20 MG/100ML
5-200 INJECTION INTRAVENOUS
Status: DISCONTINUED | OUTPATIENT
Start: 2017-09-12 | End: 2017-09-14 | Stop reason: HOSPADM

## 2017-09-12 RX ORDER — NALOXONE HCL 0.4 MG/ML
0.4 VIAL (ML) INJECTION
Status: DISCONTINUED | OUTPATIENT
Start: 2017-09-12 | End: 2017-09-14

## 2017-09-12 RX ORDER — ALBUMIN, HUMAN INJ 5% 5 %
SOLUTION INTRAVENOUS CONTINUOUS PRN
Status: DISCONTINUED | OUTPATIENT
Start: 2017-09-12 | End: 2017-09-12 | Stop reason: SURG

## 2017-09-12 RX ORDER — LIDOCAINE HYDROCHLORIDE 10 MG/ML
INJECTION, SOLUTION INFILTRATION; PERINEURAL AS NEEDED
Status: DISCONTINUED | OUTPATIENT
Start: 2017-09-12 | End: 2017-09-12 | Stop reason: HOSPADM

## 2017-09-12 RX ORDER — PROPAFENONE HYDROCHLORIDE 225 MG/1
225 TABLET, FILM COATED ORAL EVERY 12 HOURS
Status: DISCONTINUED | OUTPATIENT
Start: 2017-09-12 | End: 2017-09-14 | Stop reason: HOSPADM

## 2017-09-12 RX ORDER — SODIUM CHLORIDE 9 MG/ML
INJECTION, SOLUTION INTRAVENOUS CONTINUOUS PRN
Status: DISCONTINUED | OUTPATIENT
Start: 2017-09-12 | End: 2017-09-12 | Stop reason: SURG

## 2017-09-12 RX ORDER — ACETAMINOPHEN 325 MG/1
650 TABLET ORAL EVERY 4 HOURS PRN
Status: DISCONTINUED | OUTPATIENT
Start: 2017-09-12 | End: 2017-09-14 | Stop reason: HOSPADM

## 2017-09-12 RX ORDER — ATORVASTATIN CALCIUM 10 MG/1
10 TABLET, FILM COATED ORAL DAILY
Status: DISCONTINUED | OUTPATIENT
Start: 2017-09-12 | End: 2017-09-14 | Stop reason: HOSPADM

## 2017-09-12 RX ORDER — PROTAMINE SULFATE 10 MG/ML
INJECTION, SOLUTION INTRAVENOUS AS NEEDED
Status: DISCONTINUED | OUTPATIENT
Start: 2017-09-12 | End: 2017-09-12 | Stop reason: SURG

## 2017-09-12 RX ORDER — PHENYLEPHRINE HCL IN 0.9% NACL 0.5 MG/5ML
SYRINGE (ML) INTRAVENOUS
Status: DISPENSED
Start: 2017-09-12 | End: 2017-09-13

## 2017-09-12 RX ORDER — WARFARIN SODIUM 3 MG/1
3 TABLET ORAL
Status: DISCONTINUED | OUTPATIENT
Start: 2017-09-12 | End: 2017-09-12 | Stop reason: SDUPTHER

## 2017-09-12 RX ORDER — HYDRALAZINE HYDROCHLORIDE 20 MG/ML
INJECTION INTRAMUSCULAR; INTRAVENOUS AS NEEDED
Status: DISCONTINUED | OUTPATIENT
Start: 2017-09-12 | End: 2017-09-12 | Stop reason: SURG

## 2017-09-12 RX ORDER — DEXAMETHASONE SODIUM PHOSPHATE 4 MG/ML
INJECTION, SOLUTION INTRA-ARTICULAR; INTRALESIONAL; INTRAMUSCULAR; INTRAVENOUS; SOFT TISSUE AS NEEDED
Status: DISCONTINUED | OUTPATIENT
Start: 2017-09-12 | End: 2017-09-12 | Stop reason: SURG

## 2017-09-12 RX ORDER — PROPOFOL 10 MG/ML
VIAL (ML) INTRAVENOUS AS NEEDED
Status: DISCONTINUED | OUTPATIENT
Start: 2017-09-12 | End: 2017-09-12 | Stop reason: SURG

## 2017-09-12 RX ADMIN — MORPHINE SULFATE 1 MG: 2 INJECTION, SOLUTION INTRAMUSCULAR; INTRAVENOUS at 12:46

## 2017-09-12 RX ADMIN — PANTOPRAZOLE SODIUM 40 MG: 40 TABLET, DELAYED RELEASE ORAL at 20:04

## 2017-09-12 RX ADMIN — ROPINIROLE 1 MG: 0.5 TABLET, FILM COATED ORAL at 20:39

## 2017-09-12 RX ADMIN — HYDRALAZINE HYDROCHLORIDE 2.5 MG: 20 INJECTION INTRAMUSCULAR; INTRAVENOUS at 09:54

## 2017-09-12 RX ADMIN — ATORVASTATIN CALCIUM 10 MG: 10 TABLET, FILM COATED ORAL at 20:03

## 2017-09-12 RX ADMIN — FUROSEMIDE 20 MG: 10 INJECTION, SOLUTION INTRAMUSCULAR; INTRAVENOUS at 12:59

## 2017-09-12 RX ADMIN — PROTAMINE SULFATE 50 MG: 10 INJECTION, SOLUTION INTRAVENOUS at 10:46

## 2017-09-12 RX ADMIN — FENTANYL CITRATE 50 MCG: 50 INJECTION INTRAMUSCULAR; INTRAVENOUS at 13:15

## 2017-09-12 RX ADMIN — ALBUMIN, HUMAN INJ 5%: 5 SOLUTION at 09:35

## 2017-09-12 RX ADMIN — MULTIPLE VITAMINS W/ MINERALS TAB 1 TABLET: TAB ORAL at 20:01

## 2017-09-12 RX ADMIN — CHLORHEXIDINE GLUCONATE 15 ML: 1.2 RINSE ORAL at 20:40

## 2017-09-12 RX ADMIN — CETIRIZINE HYDROCHLORIDE 5 MG: 10 TABLET, FILM COATED ORAL at 20:05

## 2017-09-12 RX ADMIN — LIDOCAINE HYDROCHLORIDE 50 MG: 10 INJECTION, SOLUTION INFILTRATION; PERINEURAL at 08:06

## 2017-09-12 RX ADMIN — SODIUM CHLORIDE: 9 INJECTION, SOLUTION INTRAVENOUS at 09:30

## 2017-09-12 RX ADMIN — PHENYLEPHRINE HYDROCHLORIDE 200 MCG: 10 INJECTION INTRAVENOUS at 09:24

## 2017-09-12 RX ADMIN — Medication 2 TABLET: at 20:39

## 2017-09-12 RX ADMIN — DEXAMETHASONE SODIUM PHOSPHATE 8 MG: 4 INJECTION, SOLUTION INTRAMUSCULAR; INTRAVENOUS at 08:24

## 2017-09-12 RX ADMIN — HEPARIN SODIUM 12000 UNITS: 1000 INJECTION, SOLUTION INTRAVENOUS; SUBCUTANEOUS at 08:56

## 2017-09-12 RX ADMIN — DEXAMETHASONE SODIUM PHOSPHATE 8 MG: 4 INJECTION, SOLUTION INTRAMUSCULAR; INTRAVENOUS at 17:15

## 2017-09-12 RX ADMIN — VITAMIN D, TAB 1000IU (100/BT) 1000 UNITS: 25 TAB at 20:02

## 2017-09-12 RX ADMIN — ACETAMINOPHEN 650 MG: 325 TABLET, FILM COATED ORAL at 20:54

## 2017-09-12 RX ADMIN — ASPIRIN 81 MG 81 MG: 81 TABLET ORAL at 20:02

## 2017-09-12 RX ADMIN — EPHEDRINE SULFATE 30 MG: 50 INJECTION INTRAMUSCULAR; INTRAVENOUS; SUBCUTANEOUS at 09:25

## 2017-09-12 RX ADMIN — PHENYLEPHRINE HYDROCHLORIDE 200 MCG: 10 INJECTION INTRAVENOUS at 09:34

## 2017-09-12 RX ADMIN — NITROGLYCERIN 1 MCG/MIN: 20 INJECTION INTRAVENOUS at 09:40

## 2017-09-12 RX ADMIN — EPHEDRINE SULFATE 15 MG: 50 INJECTION INTRAMUSCULAR; INTRAVENOUS; SUBCUTANEOUS at 09:01

## 2017-09-12 RX ADMIN — Medication 0.5 MCG/KG/MIN: at 13:32

## 2017-09-12 RX ADMIN — PHENYLEPHRINE HYDROCHLORIDE 100 MCG: 10 INJECTION INTRAVENOUS at 09:28

## 2017-09-12 RX ADMIN — CYANOCOBALAMIN TAB 1000 MCG 500 MCG: 1000 TAB at 20:03

## 2017-09-12 RX ADMIN — PROPOFOL 100 MG: 10 INJECTION, EMULSION INTRAVENOUS at 08:06

## 2017-09-12 RX ADMIN — EPHEDRINE SULFATE 20 MG: 50 INJECTION INTRAMUSCULAR; INTRAVENOUS; SUBCUTANEOUS at 09:30

## 2017-09-12 RX ADMIN — PANCRELIPASE 24000 UNITS OF LIPASE: 24000; 76000; 120000 CAPSULE, DELAYED RELEASE PELLETS ORAL at 20:02

## 2017-09-12 RX ADMIN — SODIUM CHLORIDE: 9 INJECTION, SOLUTION INTRAVENOUS at 07:58

## 2017-09-12 RX ADMIN — MELOXICAM 7.5 MG: 7.5 TABLET ORAL at 20:40

## 2017-09-12 RX ADMIN — EPHEDRINE SULFATE 15 MG: 50 INJECTION INTRAMUSCULAR; INTRAVENOUS; SUBCUTANEOUS at 09:20

## 2017-09-12 RX ADMIN — PHENYLEPHRINE HYDROCHLORIDE 200 MCG: 10 INJECTION INTRAVENOUS at 09:32

## 2017-09-12 RX ADMIN — ATRACURIUM BESYLATE 40 MG: 10 INJECTION, SOLUTION INTRAVENOUS at 08:06

## 2017-09-12 RX ADMIN — CEFAZOLIN SODIUM 2 G: 2 INJECTION, SOLUTION INTRAVENOUS at 08:12

## 2017-09-12 RX ADMIN — HYDRALAZINE HYDROCHLORIDE 2.5 MG: 20 INJECTION INTRAMUSCULAR; INTRAVENOUS at 09:50

## 2017-09-12 RX ADMIN — PHENYLEPHRINE HYDROCHLORIDE 100 MCG: 10 INJECTION INTRAVENOUS at 10:17

## 2017-09-12 RX ADMIN — METOPROLOL TARTRATE 12.5 MG: 25 TABLET, FILM COATED ORAL at 20:04

## 2017-09-12 RX ADMIN — PROTAMINE SULFATE 50 MG: 10 INJECTION, SOLUTION INTRAVENOUS at 10:52

## 2017-09-12 RX ADMIN — ATRACURIUM BESYLATE 30 MG: 10 INJECTION, SOLUTION INTRAVENOUS at 09:50

## 2017-09-12 RX ADMIN — WARFARIN SODIUM 1.5 MG: 2 TABLET ORAL at 20:01

## 2017-09-12 RX ADMIN — NITROGLYCERIN 5 MCG/MIN: 20 INJECTION INTRAVENOUS at 11:45

## 2017-09-12 RX ADMIN — SODIUM CHLORIDE 50 ML/HR: 9 INJECTION, SOLUTION INTRAVENOUS at 11:45

## 2017-09-12 RX ADMIN — CHLORHEXIDINE GLUCONATE 15 ML: 1.2 RINSE ORAL at 16:18

## 2017-09-12 RX ADMIN — ALBUMIN, HUMAN INJ 5%: 5 SOLUTION at 09:30

## 2017-09-12 RX ADMIN — EPHEDRINE SULFATE 20 MG: 50 INJECTION INTRAMUSCULAR; INTRAVENOUS; SUBCUTANEOUS at 09:23

## 2017-09-12 RX ADMIN — PROPOFOL 25 MCG/KG/MIN: 10 INJECTION, EMULSION INTRAVENOUS at 11:52

## 2017-09-12 RX ADMIN — PROPOFOL 50 MG: 10 INJECTION, EMULSION INTRAVENOUS at 08:15

## 2017-09-12 NOTE — PROGRESS NOTES
Critical Care Note     LOS: 0 days   Patient Care Team:  Lubna Robertson MD as PCP - General (Internal Medicine)    Chief Complaint/Reason for visit:  Severe mitral regurgitation, status post mitral valve clip, drainage of pericardial effusion, postoperative ventilator management, electrolyte management      Subjective     Interval History:     87-year-old woman with known sick sinus syndrome, atrial fibrillation with a permanent pacemaker who developed progressive mitral regurgitation with dyspnea. Left heart catheterization May 22, 2017 revealed near normal coronary arteries, successful PTCA and stent of an ostial right iliac stenosis performed. Today she underwent mitral valve clip. Following trans-septal puncture and placement of the wire a small pericardial effusion developed that increased over time and was hemodynamically significant. She was given fluid bolus and phenylephrine.  Pericardiocentesis was performed and a pigtail drain placed. She now comes to the intensive care unit intubated on mechanical ventilation. She has no underlying lung disease and is a nonsmoker.    Review of Systems:    All systems were reviewed and negative except as noted in subjective.    Medical history, surgical history, social history, family history reviewed    Objective     Intake/Output:    Intake/Output Summary (Last 24 hours) at 09/12/17 1208  Last data filed at 09/12/17 1133   Gross per 24 hour   Intake             1500 ml   Output              325 ml   Net             1175 ml       Nutrition:  Diet Regular; Consistent Carbohydrate, Cardiac    Infusions:    sodium chloride 50 mL/hr       Mechanical Ventilator Settings:    Vent Mode: SIMV/VC    Vt (Set, L): 0.45 L  Resp Rate (Set): 12  Pressure Support (cm H2O): 10 cm H20  FiO2 (%): 100 %  PEEP/CPAP (cm H2O): 5 cm H20       Resp Rate (Observed) Vent: 12     I:E Ratio (Obs): 1:4.50    PIP Observed (cm H2O): 18 cm H2O       Telemetry:  Sinus Rhythm: normal sinus rhythm       "    Vital Signs  Blood pressure 105/60, pulse 75, temperature 97.9 °F (36.6 °C), temperature source Tympanic, resp. rate 18, height 66\" (167.6 cm), weight 106 lb 14.8 oz (48.5 kg), SpO2 100 %.    Physical Exam:  General Appearance:  Thin elderly white woman, sedated    Head:  Temporal wasting    Eyes:          Pupils reactive bilaterally, post cataract extraction and lens implant    Ears:     Throat: Orally intubated    Neck: Right IJ introducer, prominent JVD    Back:      Lungs:   Symmetric chest excursion, basilar inspiratory crackles, no rhonchi or wheeze     Heart:  Increased rate, regular, normal S1, S2, subtle murmur    Abdomen:   Flat, hypoactive bowel sounds, soft    Rectal:   Deferred   Extremities: Femoral sheath in place, left. FemoStop in place, right.    Pulses:    Skin: Warm and dry    Lymph nodes:    Neurologic: Sedated       Results Review:     I reviewed the patient's new clinical results.     Results from last 7 days  Lab Units 09/11/17  1156   SODIUM mmol/L 144   POTASSIUM mmol/L 4.4   CHLORIDE mmol/L 104   CO2 mmol/L 31.0   BUN mg/dL 22   CREATININE mg/dL 0.80   CALCIUM mg/dL 9.1   BILIRUBIN mg/dL 0.5   ALK PHOS U/L 65   ALT (SGPT) U/L 26   AST (SGOT) U/L 36*   GLUCOSE mg/dL 103*       Results from last 7 days  Lab Units 09/11/17  1156   WBC 10*3/mm3 6.37   HEMOGLOBIN g/dL 12.8   HEMATOCRIT % 40.7   PLATELETS 10*3/mm3 157         No results found for: BLOODCX  No results found for: URINECX    I reviewed the patient's new imaging including images and reports.    Right IJ introducer, endotracheal tube, in good position. Cardiac silhouette is not enlarged. Bilateral pulmonary vascular congestion.    All medications reviewed.     aspirin 81 mg Oral QAM   atorvastatin 10 mg Oral Daily   cetirizine 5 mg Oral Daily   cholecalciferol 1,000 Units Oral Daily   dexamethasone 8 mg Intravenous Once   levothyroxine 150 mcg Oral QAM   meloxicam 7.5 mg Oral Nightly   metoprolol succinate XL 25 mg Oral QAM "   multivitamin with minerals 1 tablet Oral Daily   pancrelipase (Lip-Prot-Amyl) 24,000 units of lipase Oral TID With Meals   pantoprazole 40 mg Oral QAM   propafenone 225 mg Oral Q12H   Propofol      rOPINIRole 1 mg Oral Nightly   sennosides-docusate sodium 2 tablet Oral Nightly   vitamin B-12 500 mcg Oral Daily   warfarin 4.5 mg Oral Daily         Assessment/Plan     Principal Problem:    Non-rheumatic mitral regurgitation  Active Problems:    Atrial fibrillation    Hypertension    Hyperlipidemia    Hypothyroidism    Acute pericardial effusion    Respiratory failure, post-operative      #1 severe mitral regurgitation with lifestyle limiting dyspnea on exertion, status post mitral valve clip complicated by pericardial effusion requiring drainage. She is no longer hypotensive. She is having scant bloody output from her pericardial drain. She is in sinus tachycardia. Hemoglobin is 12.8, at baseline with follow-up pending.     #2 postoperative respiratory failure, currently on 100% FiO2 with a saturation of 100%. Chest x-ray show some volume overload. She has no underlying lung disease and I do not anticipate difficulty with extubation.    #3  history of proximal atrial fibrillation currently in sinus rhythm    #4 essential hypertension, controlled with metoprolol as an outpatient    #5  hypothyroidism, on replacement therapy    #6  hyperlipidemia, takes Pravachol as an outpatient with a normal lipid panel in June 2017        PLAN:  Lasix 20 mg IV  Follow-up CBC, electrolytes  Resume thyroid replacement, beta blocker  Propafenone per cardiology  Substitute Lipitor for Pravachol while an inpatient      VTE Prophylaxis:foot pumps    Stress Ulcer Prophylaxis:protonix    Stacy Gibbons MD  09/12/17  12:08 PM      Time: 35min  I personally provided care to this critically ill patient as documented above.  Critical care time does not include time spent on separately billed procedures.  Non of my critical care time  was concurrent with other critical care providers.

## 2017-09-12 NOTE — H&P (VIEW-ONLY)
Marilee F Nu  6/3/1930  [phone]      08/30/2017    Jw Leung DO    Chief Complaint:  Fatigue, valvular heart disease  Problem List  1. Atrial fibrillation/sick sinus syndrome:  a. 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  b. Status post dual-chamber Medtronic N Rhythm pacemaker, 09/08/2005 (implanted on the right side).  c. Rythmol therapy since, 09/08/2005.  d. Chronic anticoagulation with Coumadin with a CHADS score = 2.  e. Normal LV function and left atrial dimension by echocardiogram, July 2005.  f. Negative exercise Cardiolite, January 2008; negative adenosine Cardiolite, June 2005; negative stress echocardiogram, April 2004.  g. First-degree AV block.  h. Generator change, September 2011, in [place], [place].  2. Valvular Heart Disease  a. Echocardiogram 4/15/17:  Moderate to sever MR and TR.  No MVP.  Normal LV function.  b. KWADWO, 5/22/2017:    3. Hypertension.  4. Hyperlipidemia.  5. Dyspnea on exertion  a. Cardiac catheterization, 5/22/2017: Near normal coronary arteries. Successful PTCA and stent placement in the ostial right iliac using a 8 x 17 express LD biliary stent.  6. Hypothyroidism, on chronic replacement therapy.  7. History of breast cancer, status post left mastectomy, 1989.  8. Osteoporosis.  9. Migraine headaches.  10. History of bleeding ulcer 15-20 years ago.  11. Gastroesophageal reflux disease.  12. Restless legs syndrome.  13. History of Helicobacter pylori, 2011.  14. Surgical history:  a. Hysterectomy, 1968.  b. Left mastectomy, 1989.  c. Colon resection for diverticulosis, 08/17/2013, Dr. Guadarrama at [facility].  d. Cholecystectomy.      Allergies   Allergen Reactions   • Doxycycline Hives     HIVES, RED FACE   • Fosamax [Alendronate] GI Intolerance   • Levofloxacin Hives     HIVES, RED FACE   • Metronidazole GI Intolerance       Current Medications:      Current Outpatient Prescriptions:   •  aspirin 81 MG tablet, Take 1 tablet  by mouth Daily., Disp: 30 tablet, Rfl: 11  •  Calcium Carb-Cholecalciferol (CALCIUM 600 + D PO), Take 1 tablet by mouth 2 (Two) Times a Day., Disp: , Rfl:   •  cholecalciferol (VITAMIN D3) 1000 UNITS tablet, Take 1,000 Units by mouth Daily., Disp: , Rfl:   •  clonazePAM (KlonoPIN) 0.5 MG tablet, Take 0.5 mg by mouth At Night As Needed for Seizures., Disp: , Rfl:   •  coenzyme Q10 100 MG capsule, Take 100 mg by mouth Daily., Disp: , Rfl:   •  HYDROcodone-acetaminophen (NORCO) 5-325 MG per tablet, Take 1 tablet by mouth Every 6 (Six) Hours As Needed for Moderate Pain (4-6)., Disp: , Rfl:   •  levothyroxine (LEVOXYL) 150 MCG tablet, Take 150 mcg by mouth Daily., Disp: , Rfl:   •  meloxicam (MOBIC) 7.5 MG tablet, Take 7.5 mg by mouth daily., Disp: , Rfl:   •  metoprolol succinate XL (TOPROL-XL) 25 MG 24 hr tablet, Take 25 mg by mouth Daily., Disp: , Rfl:   •  Multiple Vitamins-Minerals (CENTRUM ADULTS PO), Take 1 tablet by mouth Daily., Disp: , Rfl:   •  omeprazole (priLOSEC) 40 MG capsule, Take 40 mg by mouth Daily., Disp: , Rfl:   •  pancrelipase, Lip-Prot-Amyl, (CREON) 23336 UNITS capsule delayed-release particles capsule, Take 24,000 units of lipase by mouth 3 (Three) Times a Day With Meals., Disp: , Rfl:   •  pravastatin (PRAVACHOL) 40 MG tablet, Take 40 mg by mouth daily., Disp: , Rfl:   •  propafenone (RYTHMOL) 225 MG tablet, Take 225 mg by mouth Every 12 (Twelve) Hours., Disp: , Rfl:   •  rOPINIRole (REQUIP) 1 MG tablet, Take 1 mg by mouth Every Night and as needed. Take 1 hour before bedtime., Disp: , Rfl:   •  vitamin B-12 (CYANOCOBALAMIN) 500 MCG tablet, Take 500 mcg by mouth Daily., Disp: , Rfl:   •  warfarin (COUMADIN) 1 MG tablet, Take 1.5 mg by mouth Daily. As directed , Disp: , Rfl:   •  warfarin (COUMADIN) 2 MG tablet, Take 3 mg by mouth Daily. As directed , Disp: , Rfl:     HPI    Marilee Judge presents today for hospital follow up for her valvular heart disease and to reevaluate for potential mitral  "clip.  She has a known history of valvular heart disease, atrial fibrillation, hypertension, and hyperlipidemia. Since last visit, patient has been been doing okay, however she continues to feel fatigued and short of breath all the time.  She has continued to take her warfarin faithfully due to the clot was found previously when we did her first attempt with her mitral clip.  Her INRs have been therapeutic over the past several weeks.  She will need to remain on Coumadin for her procedure this time.  Patient and daughter both verbalized understanding of instructions. She denies having any current chest pain, palpitations, shortness of breath, edema, PND, orthopnea, dizziness, and syncope.  Her primary care recently increased her Requip dose to 1 mg daily at bedtime and this is helped with her restless leg syndrome.        The following portions of the patient's history were reviewed and updated as appropriate: allergies, current medications and problem list.    Pertinent positives as listed in the HPI.  All other systems reviewed are negative.    Vitals:    08/30/17 1012   BP: 134/80   BP Location: Right arm   Patient Position: Sitting   Pulse: 65   Weight: 107 lb 9.6 oz (48.8 kg)   Height: 66\" (167.6 cm)       Physical Exam:  GENERAL: well-developed, well-nourished; in no acute distress.   NECK:  There is no jugular venous distention at 30°.  Carotid upstrokes are 2+ and  symmetrical without bruits.   LUNGS: Clear to auscultation bilaterally without wheezing, rhonchi, or rales noted.   CARDIOVASCULAR: The heart has a regular rate with a normal S1 and S2. There is no murmur, gallop, rub, or click appreciated. The PMI is nondisplaced.   ABDOMEN: Soft and nontender  NEUROLOGICAL: Nonfocal; Alert and oriented  PERIPHERAL VASCULAR:  Posterior tibial and dorsalis pedis pulses are 2+ and symmetrical. There is no peripheral edema.   MUSCULOSKELETAL:  Normal ROM  SKIN:  Warm and dry  PSYCHIATRIC: normal mood and affect; " behavior appropriate    Diagnostic Data:    Lab Results   Component Value Date    GLUCOSE 96 06/12/2017    CALCIUM 9.6 06/12/2017     06/12/2017    K 4.7 06/12/2017    CO2 33.0 (H) 06/12/2017     06/12/2017    BUN 26 (H) 06/12/2017    CREATININE 1.00 06/12/2017    EGFRIFNONA 52 (L) 06/12/2017    BCR 26.0 (H) 06/12/2017    ANIONGAP 2.0 (L) 06/12/2017     Lab Results   Component Value Date    WBC 5.11 06/12/2017    HGB 11.8 06/12/2017    HCT 39.1 06/12/2017    .8 (H) 06/12/2017     06/12/2017     Lab Results   Component Value Date    CHOL 144 06/12/2017    TRIG 87 06/12/2017    HDL 60 06/12/2017    LDLDIRECT 67 06/12/2017    AST 28 05/21/2017    ALT 19 05/21/2017       ECG 12 Lead  Date/Time: 8/30/2017 10:32 AM  Performed by: FAVIAN VEGA  Authorized by: FAVIAN VEGA   Rate: normal  BPM: 65  QRS axis: right  Clinical impression: abnormal ECG  Comments: Atrial paced with prolonged AV conduction  Septal infarct          Assessment:      ICD-10-CM ICD-9-CM   1. Nonrheumatic mitral valve stenosis with insufficiency I34.2 394.2    I34.0    2. Chronic atrial fibrillation I48.2 427.31   3. Essential hypertension I10 401.9   4. Mixed hyperlipidemia E78.2 272.2       Plan:    1. Mitral Clip; STAY ON Warfarin for procedure  2. Continue current medications.  3. F/up in 3 months or sooner if needed.    Scribed for Natasha Hanna MD by LUCI Reich on 08/30/2017 at 10:42 AM    I Natasha Hanna MD personally performed the services described in this documentation as scribed by the above individual in my presence, and it is both accurate and complete.    Natasha Hanna MD, [facility]

## 2017-09-12 NOTE — PROGRESS NOTES
"[facility] Daily Note       LOS: 0 days   Patient Care Team:  Lubna Robertson MD as PCP - General (Internal Medicine)    Chief Complaint:  Mitral regurgitation    Subjective: Currently sedated and intubated following placement of a single MitraClip    Review of Systems:   As above.    Medications:    aspirin 81 mg Oral QAM   atorvastatin 10 mg Oral Daily   cetirizine 5 mg Oral Daily   cholecalciferol 1,000 Units Oral Daily   dexamethasone 8 mg Intravenous Once   levothyroxine 150 mcg Oral QAM   meloxicam 7.5 mg Oral Nightly   metoprolol succinate XL 25 mg Oral QAM   multivitamin with minerals 1 tablet Oral Daily   pancrelipase (Lip-Prot-Amyl) 24,000 units of lipase Oral TID With Meals   pantoprazole 40 mg Oral QAM   propafenone 225 mg Oral Q12H   Propofol      rOPINIRole 1 mg Oral Nightly   sennosides-docusate sodium 2 tablet Oral Nightly   vitamin B-12 500 mcg Oral Daily   warfarin 4.5 mg Oral Daily       Vital Sign Min/Max for last 24 hours  Temp  Min: 97.9 °F (36.6 °C)  Max: 97.9 °F (36.6 °C)   BP  Min: 97/54  Max: 179/96   Pulse  Min: 66  Max: 77   Resp  Min: 18  Max: 18   SpO2  Min: 99 %  Max: 100 %   No Data Recorded   Weight  Min: 106 lb 14.8 oz (48.5 kg)  Max: 106 lb 14.8 oz (48.5 kg)      Intake/Output Summary (Last 24 hours) at 09/12/17 1218  Last data filed at 09/12/17 1133   Gross per 24 hour   Intake             1500 ml   Output              325 ml   Net             1175 ml        Flowsheet Rows         First Filed Value    Admission Height  66\" (167.6 cm) Documented at 09/12/2017 0630    Admission Weight  106 lb 14.8 oz (48.5 kg) Documented at 09/12/2017 0630          Physical Exam:    General: Sedated on vent  Cardiovascular: Heart has a nondisplaced focal PMI. Regular rate and rhythm without murmur, gallop or rub.  Lungs: Clear without rales or wheezes. Equal expansion is noted.   Abdomen: Soft  Extremities: Show no edema.   Skin: warm and dry.     Results Review:    I reviewed the " patient's new clinical results.  EKG:  Tele:  A paced  Labs:      Results from last 7 days  Lab Units 09/11/17  1156   SODIUM mmol/L 144   POTASSIUM mmol/L 4.4   CHLORIDE mmol/L 104   CO2 mmol/L 31.0   BUN mg/dL 22   CREATININE mg/dL 0.80   CALCIUM mg/dL 9.1   BILIRUBIN mg/dL 0.5   ALK PHOS U/L 65   ALT (SGPT) U/L 26   AST (SGOT) U/L 36*   GLUCOSE mg/dL 103*       Results from last 7 days  Lab Units 09/11/17  1156   WBC 10*3/mm3 6.37   HEMOGLOBIN g/dL 12.8   HEMATOCRIT % 40.7   PLATELETS 10*3/mm3 157     No results found for: TROPONINI, TROPONINT  Lab Results   Component Value Date    CHOL 144 06/12/2017    CHOL 150 05/22/2017     Lab Results   Component Value Date    TRIG 87 06/12/2017    TRIG 77 05/22/2017     Lab Results   Component Value Date    HDL 60 06/12/2017    HDL 66 (H) 05/22/2017     No results found for: LDLCALC  Lab Results   Component Value Date    INR 1.75 09/12/2017    PROTIME 19.4 (H) 09/12/2017         Ejection Fraction:  60%      Assessment:    Severe mitral regurgitation now status post placement of a single  MitraClip  September 12, 2017 with residual mild mitral regurgitation.   Mean mitral valve gradient 7 mm following clip placement   MitraClip placement was complicated by a pericardial effusion following  transseptal puncture.  A pericardial drain is currently in place.  Paroxysmal atrial fibrillation on chronic Coumadin and propafenone therapy  Hypertension  Hyperlipidemia  Normal coronary arteries by catheterization May 22, 2017  Peripheral vascular disease status post stenting of the right ostial iliac artery using  an 8 x 17 express LD biliary stent      Plan:    Remove left femoral venous sheath  Watch pericardial drain for the next hour and if no additional drainage is present then proceed with efforts toward extubation  Lasix 20 mg IV  Watch in ICU overnight  Nitroglycerin as needed to keep her systolic pressure greater than 110 and less than 140.  Echocardiogram in the  morning.    Natasha Hanna MD  09/12/17  12:18 PM

## 2017-09-12 NOTE — ANESTHESIA PROCEDURE NOTES
Central Line    Patient location during procedure: OR  Indications: vascular access  Staff  Anesthesiologist: SHANNAN BAILON  Preanesthetic Checklist  Completed: patient identified, site marked, surgical consent, pre-op evaluation, timeout performed, IV checked, risks and benefits discussed and monitors and equipment checked  Central Line Prep  Sterile Tech:cap, gloves, gown, mask and sterile barriers  Prep: chloraprep  Patient monitoring: blood pressure monitoring, continuous pulse oximetry and EKG  Central Line Procedure  Laterality:right  Location:internal jugular  Catheter Type:Cordis  Catheter Size:9 Fr  Guidance:ultrasound guided  PROCEDURE NOTE/ULTRASOUND INTERPRETATION.  Using ultrasound guidance the potential vascular sites for insertion of the catheter were visualized to determine the patency of the vessel to be used for vascular access.  After selecting the appropriate site for insertion, the needle was visualized under ultrasound being inserted into the internal jugular vein, followed by ultrasound confirmation of wire and catheter placement. There were no abnormalities seen on ultrasound; an image was taken; and the patient tolerated the procedure with no complications.   Assessment  Post procedure:biopatch applied, line sutured, occlusive dressing applied and secured with tape  Assessement:blood return through all ports, free fluid flow, chest x-ray ordered and Daljit Test  Complications:no  Patient Tolerance:patient tolerated the procedure well with no apparent complications

## 2017-09-12 NOTE — PLAN OF CARE
Problem: Patient Care Overview (Adult)  Goal: Plan of Care Review  Outcome: Ongoing (interventions implemented as appropriate)  HERE FOR MITRAL CLIP; PROCEDURE TEACHING DONE AT BS PER DR. DYER    09/12/17 0716   Coping/Psychosocial Response Interventions   Plan Of Care Reviewed With patient;daughter   Patient Care Overview   Progress no change         Problem: Cardiac Catheterization with/without PCI (Adult)  Goal: Signs and Symptoms of Listed Potential Problems Will be Absent or Manageable (Cardiac Catheterization with/without PCI)  Outcome: Ongoing (interventions implemented as appropriate)  ORIENTED TO ROOM UPON ADMISSION, AND PROCEDURE PER DR. DYER    09/12/17 0716   Cardiac Catheterization with/without PCI   Problems Assessed (Cardiac Catheterization) other (see comments)   Problems Present (Cardiac Catheterization) other (see comments)

## 2017-09-12 NOTE — ANESTHESIA PREPROCEDURE EVALUATION
Anesthesia Evaluation     Patient summary reviewed and Nursing notes reviewed   NPO Solid Status: > 8 hours  NPO Liquid Status: > 2 hours     Airway   Mallampati: I  TM distance: >3 FB  Neck ROM: full  no difficulty expected  Dental    (+) edentulous, lower dentures and upper dentures    Pulmonary    (+) shortness of breath, decreased breath sounds,   Cardiovascular     Rhythm: regular  Rate: normal    (+) pacemaker pacemaker, hypertension well controlled, valvular problems/murmurs MR, dysrhythmias, hyperlipidemia      Neuro/Psych  (+) headaches,    GI/Hepatic/Renal/Endo    (+)  GERD well controlled, PUD, hypothyroidism,     Musculoskeletal     Abdominal  - normal exam  (-) obese    Abdomen: soft.   Substance History      OB/GYN          Other   (+) arthritis   history of cancer remission                                    Anesthesia Plan    ASA 3     general     intravenous induction   Anesthetic plan and risks discussed with patient.    Plan discussed with CRNA.

## 2017-09-12 NOTE — PLAN OF CARE
Problem: Patient Care Overview (Adult)  Goal: Plan of Care Review  Outcome: Ongoing (interventions implemented as appropriate)    09/12/17 1838   Coping/Psychosocial Response Interventions   Plan Of Care Reviewed With patient;daughter   Patient Care Overview   Progress improving   Outcome Evaluation   Outcome Summary/Follow up Plan Pt. currently on Nitro.Extubated on 2L. no pain. pericardial drainage decreasing. Pt. stable       Goal: Adult Individualization and Mutuality  Outcome: Ongoing (interventions implemented as appropriate)    Problem: Cardiac Catheterization with/without PCI (Adult)  Goal: Signs and Symptoms of Listed Potential Problems Will be Absent or Manageable (Cardiac Catheterization with/without PCI)  Outcome: Ongoing (interventions implemented as appropriate)    09/12/17 1838   Cardiac Catheterization with/without PCI   Problems Assessed (Cardiac Catheterization) all   Problems Present (Cardiac Catheterization) cardiopulmonary complications         Problem: Mechanical Ventilation, Invasive (Adult)  Goal: Signs and Symptoms of Listed Potential Problems Will be Absent or Manageable (Mechanical Ventilation, Invasive)  Outcome: Outcome(s) achieved Date Met:  09/12/17 09/12/17 1838   Mechanical Ventilation, Invasive   Problems Assessed (Mechanical Ventilation, Invasive) all   Problems Present (Mechanical Ventilation, Invasive) none         Problem: Cardiac Output, Decreased (Adult)  Goal: Identify Related Risk Factors and Signs and Symptoms  Outcome: Outcome(s) achieved Date Met:  09/12/17 09/12/17 1838   Cardiac Output, Decreased   Cardiac Output, Decreased: Related Risk Factors cardiac surgery/procedure;valve dysfunction   Signs and Symptoms (Cardiac Output Decreased) jugular vein distention       Goal: Adequate Cardiac Output/Effective Tissue Perfusion  Outcome: Ongoing (interventions implemented as appropriate)    Problem: Pressure Ulcer Risk (Nate Scale) (Adult,Obstetrics,Pediatric)  Goal:  Identify Related Risk Factors and Signs and Symptoms  Outcome: Outcome(s) achieved Date Met:  09/12/17 09/12/17 1837   Pressure Ulcer Risk (Nate Scale)   Related Risk Factors (Pressure Ulcer Risk (Nate Scale)) age extremes;critical care admission;medical devices       Goal: Skin Integrity  Outcome: Ongoing (interventions implemented as appropriate)

## 2017-09-12 NOTE — ANESTHESIA PROCEDURE NOTES
Arterial Line    Patient location during procedure: OR   Line placed for hemodynamic monitoring.  Performed By   Anesthesiologist: SHANNAN BAILON  Preanesthetic Checklist  Completed: patient identified, site marked, surgical consent, pre-op evaluation, timeout performed, IV checked, risks and benefits discussed and monitors and equipment checked  Arterial Line Prep   Sterile Tech: cap, gloves and sterile barriers  Prep: ChloraPrep  Patient monitoring: blood pressure monitoring, continuous pulse oximetry and EKG  Arterial Line Procedure   Laterality:right  Location:  radial artery  Catheter size: 20 G   Guidance: palpation technique  Number of attempts: 1  Successful placement: yes          Post Assessment   Dressing Type: line sutured, occlusive dressing applied, secured with tape and wrist guard applied.   Complications no  Circ/Move/Sens Assessment: normal and unchanged.   Patient Tolerance: patient tolerated the procedure well with no apparent complications  Additional Notes  2 attempts in CVOU by carmelo ATKINS unsuccessful

## 2017-09-12 NOTE — ANESTHESIA POSTPROCEDURE EVALUATION
Patient: Marilee Judge    Procedure Summary     Date Anesthesia Start Anesthesia Stop Room / Location    09/12/17 0758 1137 [facility] / [facility] LOCATION       Procedure Diagnosis Provider Provider    Abigail Clip (N/A ) Mitral stenosis with regurgitation  (MS) MD Mansoor Farah MD          Anesthesia Type: general  Last vitals  BP   101/54 (09/12/17 1131)    Temp        Pulse   74 (09/12/17 1133)   Resp     vent 12 b/m   SpO2   100 % (09/12/17 1133)      Post Anesthesia Care and Evaluation    Patient location during evaluation: ICU  Patient participation: complete - patient cannot participate  Pain score: 0  Pain management: adequate  Airway patency: patent  Anesthetic complications: No anesthetic complications  PONV Status: none  Cardiovascular status: hemodynamically stable and acceptable  Respiratory status: acceptable, ETT, intubated and ventilator  Hydration status: acceptable

## 2017-09-12 NOTE — INTERVAL H&P NOTE
H&P reviewed. The patient was examined and there are no changes to the H&P.    Natasha Hanna MD, FACC

## 2017-09-12 NOTE — ANESTHESIA PROCEDURE NOTES
Airway  Urgency: elective    Airway not difficult    General Information and Staff    Patient location during procedure: OR  CRNA: SUE BARLOW    Indications and Patient Condition  Indications for airway management: airway protection    Preoxygenated: yes  MILS not maintained throughout  Mask difficulty assessment: 1 - vent by mask    Final Airway Details  Final airway type: endotracheal airway      Successful airway: ETT  Cuffed: yes   Successful intubation technique: direct laryngoscopy  Facilitating devices/methods: intubating stylet  Endotracheal tube insertion site: oral  Blade: Pierce  Blade size: #2  ETT size: 6.5 mm  Cormack-Lehane Classification: grade I - full view of glottis  Placement verified by: chest auscultation and capnometry   Measured from: lips  ETT to lips (cm): 20  Number of attempts at approach: 1    Additional Comments  Negative epigastric sounds, Breath sound equal bilaterally with symmetric chest rise and fall.  No Teeth.  Atraumatic

## 2017-09-13 ENCOUNTER — APPOINTMENT (OUTPATIENT)
Dept: CARDIOLOGY | Facility: HOSPITAL | Age: 82
End: 2017-09-13
Attending: INTERNAL MEDICINE

## 2017-09-13 LAB
ANION GAP SERPL CALCULATED.3IONS-SCNC: 7 MMOL/L (ref 3–11)
BH CV ECHO MEAS - BSA(HAYCOCK): 1.5 M^2
BH CV ECHO MEAS - BSA: 1.5 M^2
BH CV ECHO MEAS - BZI_BMI: 17.1 KILOGRAMS/M^2
BH CV ECHO MEAS - BZI_METRIC_HEIGHT: 167.6 CM
BH CV ECHO MEAS - BZI_METRIC_WEIGHT: 48.1 KG
BH CV ECHO MEAS - EDV(CUBED): 33.6 ML
BH CV ECHO MEAS - EDV(TEICH): 41.8 ML
BH CV ECHO MEAS - EF(CUBED): 82.3 %
BH CV ECHO MEAS - EF(TEICH): 76.4 %
BH CV ECHO MEAS - ESV(CUBED): 5.9 ML
BH CV ECHO MEAS - ESV(TEICH): 9.9 ML
BH CV ECHO MEAS - FS: 43.9 %
BH CV ECHO MEAS - IVS/LVPW: 1
BH CV ECHO MEAS - IVSD: 0.98 CM
BH CV ECHO MEAS - LV MASS(C)D: 88.2 GRAMS
BH CV ECHO MEAS - LV MASS(C)DI: 57.7 GRAMS/M^2
BH CV ECHO MEAS - LVIDD: 3.2 CM
BH CV ECHO MEAS - LVIDS: 1.8 CM
BH CV ECHO MEAS - LVPWD: 0.97 CM
BH CV ECHO MEAS - SI(CUBED): 18.1 ML/M^2
BH CV ECHO MEAS - SI(TEICH): 20.9 ML/M^2
BH CV ECHO MEAS - SV(CUBED): 27.7 ML
BH CV ECHO MEAS - SV(TEICH): 32 ML
BH CV VAS BP LEFT ARM: NORMAL MMHG
BUN BLD-MCNC: 19 MG/DL (ref 9–23)
BUN/CREAT SERPL: 27.1 (ref 7–25)
CALCIUM SPEC-SCNC: 8.4 MG/DL (ref 8.7–10.4)
CHLORIDE SERPL-SCNC: 107 MMOL/L (ref 99–109)
CO2 SERPL-SCNC: 24 MMOL/L (ref 20–31)
CREAT BLD-MCNC: 0.7 MG/DL (ref 0.6–1.3)
DEPRECATED RDW RBC AUTO: 53.3 FL (ref 37–54)
ERYTHROCYTE [DISTWIDTH] IN BLOOD BY AUTOMATED COUNT: 15 % (ref 11.3–14.5)
GFR SERPL CREATININE-BSD FRML MDRD: 79 ML/MIN/1.73
GLUCOSE BLD-MCNC: 170 MG/DL (ref 70–100)
GLUCOSE BLDC GLUCOMTR-MCNC: 107 MG/DL (ref 70–130)
GLUCOSE BLDC GLUCOMTR-MCNC: 126 MG/DL (ref 70–130)
GLUCOSE BLDC GLUCOMTR-MCNC: 99 MG/DL (ref 70–130)
HCT VFR BLD AUTO: 31.2 % (ref 34.5–44)
HGB BLD-MCNC: 9.9 G/DL (ref 11.5–15.5)
INR PPP: 2.14
MCH RBC QN AUTO: 30.6 PG (ref 27–31)
MCHC RBC AUTO-ENTMCNC: 31.7 G/DL (ref 32–36)
MCV RBC AUTO: 96.3 FL (ref 80–99)
PLATELET # BLD AUTO: 130 10*3/MM3 (ref 150–450)
PMV BLD AUTO: 10.9 FL (ref 6–12)
POTASSIUM BLD-SCNC: 3.7 MMOL/L (ref 3.5–5.5)
PROTHROMBIN TIME: 23.9 SECONDS (ref 9.6–11.5)
RBC # BLD AUTO: 3.24 10*6/MM3 (ref 3.89–5.14)
SODIUM BLD-SCNC: 138 MMOL/L (ref 132–146)
WBC NRBC COR # BLD: 10.61 10*3/MM3 (ref 3.5–10.8)

## 2017-09-13 PROCEDURE — 93308 TTE F-UP OR LMTD: CPT | Performed by: INTERNAL MEDICINE

## 2017-09-13 PROCEDURE — 93308 TTE F-UP OR LMTD: CPT

## 2017-09-13 PROCEDURE — 82962 GLUCOSE BLOOD TEST: CPT

## 2017-09-13 PROCEDURE — 80048 BASIC METABOLIC PNL TOTAL CA: CPT | Performed by: INTERNAL MEDICINE

## 2017-09-13 PROCEDURE — 85610 PROTHROMBIN TIME: CPT | Performed by: INTERNAL MEDICINE

## 2017-09-13 PROCEDURE — 99232 SBSQ HOSP IP/OBS MODERATE 35: CPT | Performed by: INTERNAL MEDICINE

## 2017-09-13 PROCEDURE — 99024 POSTOP FOLLOW-UP VISIT: CPT | Performed by: INTERNAL MEDICINE

## 2017-09-13 PROCEDURE — 85027 COMPLETE CBC AUTOMATED: CPT | Performed by: INTERNAL MEDICINE

## 2017-09-13 RX ORDER — ROPINIROLE 0.5 MG/1
1 TABLET, FILM COATED ORAL EVERY 12 HOURS PRN
Status: DISCONTINUED | OUTPATIENT
Start: 2017-09-13 | End: 2017-09-14 | Stop reason: HOSPADM

## 2017-09-13 RX ORDER — NICOTINE POLACRILEX 4 MG
15 LOZENGE BUCCAL
Status: DISCONTINUED | OUTPATIENT
Start: 2017-09-13 | End: 2017-09-14 | Stop reason: HOSPADM

## 2017-09-13 RX ORDER — DEXTROSE MONOHYDRATE 25 G/50ML
25 INJECTION, SOLUTION INTRAVENOUS
Status: DISCONTINUED | OUTPATIENT
Start: 2017-09-13 | End: 2017-09-14 | Stop reason: HOSPADM

## 2017-09-13 RX ADMIN — ROPINIROLE 1 MG: 0.5 TABLET, FILM COATED ORAL at 18:52

## 2017-09-13 RX ADMIN — PANCRELIPASE 24000 UNITS OF LIPASE: 24000; 76000; 120000 CAPSULE, DELAYED RELEASE PELLETS ORAL at 18:01

## 2017-09-13 RX ADMIN — PANCRELIPASE 24000 UNITS OF LIPASE: 24000; 76000; 120000 CAPSULE, DELAYED RELEASE PELLETS ORAL at 11:59

## 2017-09-13 RX ADMIN — ASPIRIN 81 MG 81 MG: 81 TABLET ORAL at 09:16

## 2017-09-13 RX ADMIN — MULTIPLE VITAMINS W/ MINERALS TAB 1 TABLET: TAB ORAL at 09:16

## 2017-09-13 RX ADMIN — VITAMIN D, TAB 1000IU (100/BT) 1000 UNITS: 25 TAB at 09:16

## 2017-09-13 RX ADMIN — PANTOPRAZOLE SODIUM 40 MG: 40 TABLET, DELAYED RELEASE ORAL at 09:16

## 2017-09-13 RX ADMIN — PROPAFENONE HYDROCHLORIDE 225 MG: 225 TABLET, FILM COATED ORAL at 00:02

## 2017-09-13 RX ADMIN — CYANOCOBALAMIN TAB 1000 MCG 500 MCG: 1000 TAB at 09:16

## 2017-09-13 RX ADMIN — CHLORHEXIDINE GLUCONATE 15 ML: 1.2 RINSE ORAL at 20:14

## 2017-09-13 RX ADMIN — WARFARIN SODIUM 3 MG: 3 TABLET ORAL at 18:01

## 2017-09-13 RX ADMIN — PROPAFENONE HYDROCHLORIDE 225 MG: 225 TABLET, FILM COATED ORAL at 11:58

## 2017-09-13 RX ADMIN — Medication 1 PATCH: at 15:24

## 2017-09-13 RX ADMIN — CETIRIZINE HYDROCHLORIDE 5 MG: 10 TABLET, FILM COATED ORAL at 09:15

## 2017-09-13 RX ADMIN — PANCRELIPASE 24000 UNITS OF LIPASE: 24000; 76000; 120000 CAPSULE, DELAYED RELEASE PELLETS ORAL at 09:13

## 2017-09-13 RX ADMIN — MELOXICAM 7.5 MG: 7.5 TABLET ORAL at 20:14

## 2017-09-13 RX ADMIN — Medication 2 TABLET: at 20:14

## 2017-09-13 RX ADMIN — ATORVASTATIN CALCIUM 10 MG: 10 TABLET, FILM COATED ORAL at 09:16

## 2017-09-13 NOTE — PLAN OF CARE
Problem: Patient Care Overview (Adult)  Goal: Plan of Care Review  Outcome: Ongoing (interventions implemented as appropriate)    09/13/17 1929   Coping/Psychosocial Response Interventions   Plan Of Care Reviewed With patient;family   Patient Care Overview   Progress improving   Outcome Evaluation   Outcome Summary/Follow up Plan Pt got drains d/c'd. Cordis and patrick d/c'd. Ambulated 150 feet. No gtts, room air. Orders vs. home tomorrow.         Problem: Cardiac Catheterization with/without PCI (Adult)  Goal: Signs and Symptoms of Listed Potential Problems Will be Absent or Manageable (Cardiac Catheterization with/without PCI)  Outcome: Ongoing (interventions implemented as appropriate)    09/13/17 1929   Cardiac Catheterization with/without PCI   Problems Present (Cardiac Catheterization) none         Problem: Pressure Ulcer Risk (Nate Scale) (Adult,Obstetrics,Pediatric)  Goal: Skin Integrity  Outcome: Ongoing (interventions implemented as appropriate)    09/13/17 1929   Pressure Ulcer Risk (Nate Scale) (Adult,Obstetrics,Pediatric)   Skin Integrity achieves outcome

## 2017-09-13 NOTE — PROGRESS NOTES
There is a trivial residual pericardial effusion and an notable pleural effusion on echo.    The pericardial drain was removed without difficulty.  The stitch from the figure-of-eight closure of the right femoral vein was removed without difficulty.    Natasha Hanna M.D. [facility]

## 2017-09-13 NOTE — PROGRESS NOTES
"Adult Nutrition  Assessment/PES    Patient Name:  Marilee Judge  YOB: 1930  MRN: [id number]  Admit Date:  9/12/2017    Assessment Date:  9/13/2017     Advance diet as tolerated to Soft, whole food, cardiac, GI soft (not ready for diet to be advanced at this time)         Reason for Assessment       09/13/17 1250    Reason for Assessment    Reason For Assessment/Visit identified at risk by screening criteria;multidisciplinary rounds    Identified At Risk By Screening Criteria BMI    Time Spent (min) 30    Diagnosis Diagnosis    Cardiac Other (comment)   Mitral valve stenosis; S/P mitral valve clip + drinage of predicaridal effusion on 9/12    Gastrointestinal GERD/Reflux;Other (comment)   H/o H.pylori, bleeding ulcers, colon resection in 2013   Principal Problem:    Non-rheumatic mitral regurgitation  Active Problems:    Atrial fibrillation    Hypertension    Hyperlipidemia    Hypothyroidism    Acute pericardial effusion    Respiratory failure, post-operative               Nutrition/Diet History       09/13/17 1252    Nutrition/Diet History    Patient Reported Diet/Restrictions/Preferences other (see comments)   low residue since colon resection.      Food Allergies/Intolerances corn;other (see comments)   fiber eb foods; supplements (give her diarrhea)     Factors Affecting Nutritional Intake Factors    Reported/Observed By Patient    Other Weight loss since colon resection in 2013.  weight around 106-110 since then. Good appetite prior to admit. Does not tolerate fiber rich foods.             Anthropometrics       09/13/17 1254    Anthropometrics    Height 167.6 cm (65.98\")    Weight 48.1 kg (106 lb)    Ideal Body Weight (IBW)    Ideal Body Weight (IBW), Female 59.94    % Ideal Body Weight 80.38    Usual Body Weight (UBW)    Usual Body Weight 49.9 kg (110 lb)   in the past 4 years     % Usual Body Weight 96.36    Body Mass Index (BMI)    BMI (kg/m2) 17.15            Labs/Tests/Procedures/Meds     "   09/13/17 1256    Labs/Tests/Procedures/Meds    Labs/Tests Review Reviewed    Medication Review Reviewed, pertinent   creon, protonix, senokot, MVI, synthroid, coumadin     Significant Vitals Other (comment)                Nutrition Prescription Ordered       09/13/17 1257    Nutrition Prescription PO    Current PO Diet Full Liquid            Evaluation of Received Nutrient/Fluid Intake       09/13/17 1257    EN Evaluation    Number of Days EN Intake Evaluated Insufficient Data              Problem/Interventions:        Problem 1       09/13/17 1258    Nutrition Diagnoses Problem 1    Problem 1 No Nutrition Diagnosis at this Time    Etiology (related to) --    Signs/Symptoms (evidenced by) --    BMI --                    Intervention Goal       09/13/17 1258    Intervention Goal    General Nutrition support treatment    PO Tolerate PO;Advance diet            Nutrition Intervention       09/13/17 1258    Nutrition Intervention    RD/Tech Action Follow Tx progress;Care plan reviewd;Recommend/ordered    Recommended/Ordered Diet            Nutrition Prescription       09/13/17 1302    Nutrition Prescription PO    PO Prescription Begin/change diet    Begin/Change Diet to Soft Texture    Texture Whole foods    Common Modifiers Cardiac;GI Soft/[place]    New PO Prescription Ordered? No, recommended            Education/Evaluation       09/13/17 1303    Monitor/Evaluation    Monitor Per protocol;PO intake;Pertinent labs;Symptoms;Weight        Comments:      Electronically signed by:  Pilar Napier RD  09/13/17 1:03 PM

## 2017-09-13 NOTE — PLAN OF CARE
Problem: Patient Care Overview (Adult)  Goal: Plan of Care Review  Outcome: Ongoing (interventions implemented as appropriate)    09/12/17 1838 09/12/17 2000 09/13/17 0328   Coping/Psychosocial Response Interventions   Plan Of Care Reviewed With --  patient;family --    Patient Care Overview   Progress improving --  --    Outcome Evaluation   Outcome Summary/Follow up Plan --  --  Hemodynamically stable; NTG weaned; pericardial drainage decreased; no pain; remains on 2LNC         Problem: Cardiac Catheterization with/without PCI (Adult)  Goal: Signs and Symptoms of Listed Potential Problems Will be Absent or Manageable (Cardiac Catheterization with/without PCI)  Outcome: Ongoing (interventions implemented as appropriate)    09/12/17 1838   Cardiac Catheterization with/without PCI   Problems Assessed (Cardiac Catheterization) all   Problems Present (Cardiac Catheterization) cardiopulmonary complications         Problem: Cardiac Output, Decreased (Adult)  Goal: Adequate Cardiac Output/Effective Tissue Perfusion  Outcome: Ongoing (interventions implemented as appropriate)    09/13/17 0328   Cardiac Output, Decreased (Adult)   Adequate Cardiac Output/Effective Tissue Perfusion making progress toward outcome         Problem: Pressure Ulcer Risk (Nate Scale) (Adult,Obstetrics,Pediatric)  Goal: Skin Integrity  Outcome: Ongoing (interventions implemented as appropriate)    09/13/17 0328   Pressure Ulcer Risk (Nate Scale) (Adult,Obstetrics,Pediatric)   Skin Integrity making progress toward outcome

## 2017-09-13 NOTE — PROGRESS NOTES
Discharge Planning Assessment  [facility]     Patient Name: Marilee Judge  MRN: [id number]  Today's Date: 9/13/2017    Admit Date: 9/12/2017          Discharge Needs Assessment       09/13/17 1254    Living Environment    Lives With child(jori), adult    Living Arrangements house    Transportation Available car;family or friend will provide    Living Environment    Provides Primary Care For no one    Quality Of Family Relationships supportive    Able to Return to Prior Living Arrangements yes    Discharge Needs Assessment    Concerns To Be Addressed no discharge needs identified;denies needs/concerns at this time    Readmission Within The Last 30 Days no previous admission in last 30 days    Outpatient/Agency/Support Group Needs other (see comments)   Coumadin and PT/INR lab work is managed by her PCP    Anticipated Changes Related to Illness none    Equipment Currently Used at Home none    Equipment Needed After Discharge none    Discharge Disposition home or self-care    Discharge Contact Information if Applicable [phone]    Discharge Planning Comments home with daughter to [facility]            Discharge Plan       09/13/17 1254    Case Management/Social Work Plan    Plan home with daughter to [facility]    Patient/Family In Agreement With Plan yes    Additional Comments Ms. Judge lives with her adult daughter, Nazia who is currently at bedside, in [facility]. She states she was very independent PTA, uses no ambulatory aids, no home health or DME services PTA. She states she has ample support and daughter at bedside states she will be avlb to her mom 24/7 upon discharge. Ms. Judge states she has no issues or concerns regarding her Rx meds nor insurance coverage. Her PCP manages her INR's and coumadin dosages. Plans at discharge are to return home with daughter. Currently she nor daughter have any concerns or needs identified at this time. However, she is open to having home health if needed upon  discharge and her preference of agency is [facility]. CM will continue to follow.         Discharge Placement     No information found        Expected Discharge Date and Time     Expected Discharge Date Expected Discharge Time    Sep 18, 2017               Demographic Summary       09/13/17 1249    Referral Information    Admission Type inpatient    Arrived From home or self-care    Referral Source admission list;physician    Reason For Consult discharge planning    Record Reviewed history and physical;medical record;clinical discipline documentation    Contact Information    Permission Granted to Share Information With     Primary Care Physician Information    Name glenny celaya            Functional Status       09/13/17 1253    Functional Status Current    Ambulation 2-->assistive person    Transferring 2-->assistive person    Toileting 2-->assistive person    Bathing 2-->assistive person    Dressing 2-->assistive person    Eating 0-->independent    Communication 0-->understands/communicates without difficulty    Swallowing (if score 2 or more for any item, consult Rehab Services) 0-->swallows foods/liquids without difficulty    Change in Functional Status Since Onset of Current Illness/Injury no    Functional Status Prior    Ambulation 0-->independent    Transferring 0-->independent    Toileting 0-->independent    Bathing 0-->independent    Dressing 0-->independent    Eating 0-->independent    Communication 0-->understands/communicates without difficulty    Swallowing 0-->swallows foods/liquids without difficulty    IADL    Medications independent   has rx drug coverage with no issues in obtaining or affording copays    Meal Preparation independent    Housekeeping independent    Laundry independent    Shopping independent    Oral Care independent    Activity Tolerance    Current Activity Limitations driving restrictions;lifting restrictions    Usual Activity Tolerance moderate    Current  Activity Tolerance fair    Cognitive/Perceptual/Developmental    Current Mental Status/Cognitive Functioning no deficits noted    Recent Changes in Mental Status/Cognitive Functioning no changes    Employment/Financial    Financial Concerns none   has medicare a/b and [employer] insurance with no recent changes to coverage            Psychosocial     None            Abuse/Neglect     None            Legal     None            Substance Abuse     None            Patient Forms     None          Simin Cohn RN

## 2017-09-13 NOTE — PROGRESS NOTES
"[facility] Daily Note       LOS: 1 day   Patient Care Team:  Lubna Robertson MD as PCP - General (Internal Medicine)    Chief Complaint:  Mitral regurgitation    Subjective: Awake and alert up eating breakfast.  Her throat is slightly sore but she feels good overall.  Her restless legs are already bothering her this morning.    Review of Systems:   As above.    Medications:    aspirin 81 mg Oral QAM   atorvastatin 10 mg Oral Daily   cetirizine 5 mg Oral Daily   chlorhexidine 15 mL Mouth/Throat Q12H   cholecalciferol 1,000 Units Oral Daily   fentaNYL citrate (PF) 50 mcg Intravenous Once   levothyroxine 150 mcg Oral QAM   meloxicam 7.5 mg Oral Nightly   metoprolol tartrate 12.5 mg Oral Q12H   multivitamin with minerals 1 tablet Oral Daily   pancrelipase (Lip-Prot-Amyl) 24,000 units of lipase Oral TID With Meals   pantoprazole 40 mg Oral QAM   propafenone 225 mg Oral Q12H   rOPINIRole 1 mg Oral Nightly   sennosides-docusate sodium 2 tablet Oral Nightly   vitamin B-12 500 mcg Oral Daily   warfarin 1.5 mg Oral Q48H   warfarin 3 mg Oral Q48H       Vital Sign Min/Max for last 24 hours  Temp  Min: 97.4 °F (36.3 °C)  Max: 100 °F (37.8 °C)   BP  Min: 83/43  Max: 147/71   Pulse  Min: 66  Max: 93   Resp  Min: 12  Max: 20   SpO2  Min: 94 %  Max: 100 %   Flow (L/min)  Min: 2  Max: 2   No Data Recorded      Intake/Output Summary (Last 24 hours) at 09/13/17 0748  Last data filed at 09/13/17 0700   Gross per 24 hour   Intake           2542.6 ml   Output             3450 ml   Net           -907.4 ml        Flowsheet Rows         First Filed Value    Admission Height  66\" (167.6 cm) Documented at 09/12/2017 0630    Admission Weight  106 lb 14.8 oz (48.5 kg) Documented at 09/12/2017 0630          Physical Exam:    General: Alert and oriented  Cardiovascular: Heart has a nondisplaced focal PMI. Regular rate and rhythm without murmur, gallop or rub.  Lungs: Clear without rales or wheezes. Equal expansion is noted.   Abdomen: " Soft  Extremities: Show no edema.   Skin: warm and dry.     Results Review:    I reviewed the patient's new clinical results.  EKG:  Tele:  A paced  Labs:      Results from last 7 days  Lab Units 09/13/17  0346 09/11/17  1156   SODIUM mmol/L 138 144   POTASSIUM mmol/L 3.7 4.4   CHLORIDE mmol/L 107 104   CO2 mmol/L 24.0 31.0   BUN mg/dL 19 22   CREATININE mg/dL 0.70 0.80   CALCIUM mg/dL 8.4* 9.1   BILIRUBIN mg/dL  --  0.5   ALK PHOS U/L  --  65   ALT (SGPT) U/L  --  26   AST (SGOT) U/L  --  36*   GLUCOSE mg/dL 170* 103*       Results from last 7 days  Lab Units 09/13/17  0346 09/12/17  1231 09/11/17  1156   WBC 10*3/mm3 10.61 6.12 6.37   HEMOGLOBIN g/dL 9.9* 9.4* 12.8   HEMATOCRIT % 31.2* 30.0* 40.7   PLATELETS 10*3/mm3 130* 118* 157     No results found for: TROPONINI, TROPONINT  Lab Results   Component Value Date    CHOL 144 06/12/2017    CHOL 150 05/22/2017     Lab Results   Component Value Date    TRIG 87 06/12/2017    TRIG 77 05/22/2017     Lab Results   Component Value Date    HDL 60 06/12/2017    HDL 66 (H) 05/22/2017     No results found for: LDLCALC  Lab Results   Component Value Date    INR 2.14 09/13/2017    INR 1.88 09/12/2017    INR 1.75 09/12/2017    PROTIME 23.9 (H) 09/13/2017    PROTIME 20.9 (H) 09/12/2017    PROTIME 19.4 (H) 09/12/2017         Ejection Fraction:  60%      Assessment:    Severe mitral regurgitation now status post placement of a single  MitraClip  September 12, 2017 with residual mild mitral regurgitation.   Mean mitral valve gradient 7 mm following clip placement   MitraClip placement was complicated by a pericardial effusion following  transseptal puncture.  A pericardial drain is currently in place.  Paroxysmal atrial fibrillation on chronic Coumadin and propafenone therapy  Hypertension  Hyperlipidemia  Normal coronary arteries by catheterization May 22, 2017  Peripheral vascular disease status post stenting of the right ostial iliac artery using  an 8 x 17 express LD biliary  stent      Plan:    DC IJ sheath  DC arterial line  DC Ford  Echocardiogram this morning to assess pericardial effusion  The pericardial drain will be pulled around noon and the stitch will be removed from her right groin at that time.  Increase Requip 1 mg twice a day with a daytime dose as needed for restless legs  Transfer to telemetry later today  Home in [facility]    Natasha Hanna MD  09/13/17  7:48 AM

## 2017-09-13 NOTE — PROGRESS NOTES
Critical Care Note     LOS: 1 day   Patient Care Team:  Lubna Robertson MD as PCP - General (Internal Medicine)    Chief Complaint/Reason for visit:  Severe mitral regurgitation, status post mitral valve clip, drainage of pericardial effusion, postoperative ventilator management, electrolyte management      Subjective   87-year-old woman with known sick sinus syndrome, atrial fibrillation with a permanent pacemaker who developed progressive mitral regurgitation with dyspnea. Left heart catheterization May 22, 2017 revealed near normal coronary arteries, successful PTCA and stent of an ostial right iliac stenosis performed. 9/12 she underwent mitral valve clip. Following trans-septal puncture and placement of the wire a small pericardial effusion developed that increased over time  was hemodynamically significant. She was given fluid bolus and phenylephrine.  Pericardiocentesis was performed and a pigtail drain placed. She came to the intensive care unit intubated on mechanical ventilation. She has no underlying lung disease and is a nonsmoker.    Interval History:     She was extubated yesterday to nasal cannula. She was hypotensive on phenylephrine this morning. Repeat echocardiogram revealed no significant pericardial effusion and drain was removed. She is tolerating a by mouth diet. Arterial line, Ford, sheaths all removed.    Review of Systems:    All systems were reviewed and negative except as noted in subjective.    Medical history, surgical history, social history, family history reviewed    Objective     Intake/Output:    Intake/Output Summary (Last 24 hours) at 09/13/17 1506  Last data filed at 09/13/17 1200   Gross per 24 hour   Intake           1042.6 ml   Output             2820 ml   Net          -1777.4 ml       Nutrition:  Diet Full Liquid    Infusions:    nitroglycerin 5-200 mcg/min Last Rate: Stopped (09/12/17 2300)   phenylephrine 0.5-3 mcg/kg/min Last Rate: 0.2 mcg/kg/min (09/13/17 0400)  "  propofol 5-50 mcg/kg/min Last Rate: Stopped (09/12/17 1525)          Telemetry:  Sinus Rhythm: normal sinus rhythm          Vital Signs  Blood pressure 98/45, pulse 79, temperature 98.7 °F (37.1 °C), temperature source Oral, resp. rate 16, height 65.98\" (167.6 cm), weight 106 lb (48.1 kg), SpO2 96 %.    Physical Exam:  General Appearance:  Thin elderly white woman, No distress    Head:  Temporal wasting    Eyes:          Pupils reactive bilaterally, post cataract extraction and lens implant    Ears:     Throat: Mucous membranes moist    Neck: Lines removed, no hematoma, trachea midline    Back:      Lungs:   Symmetric chest excursion, Lungs are clear, no rhonchi or wheeze     Heart:  Normal rate, regular, normal S1, S2, subtle murmur    Abdomen:   Flat,active bowel sounds, soft    Rectal:   Deferred   Extremities: Femoral sites out hematoma    Pulses: Pedal pulses present    Skin: Warm and dry    Lymph nodes:    Neurologic: Alert and oriented, no focal weakness       Results Review:     I reviewed the patient's new clinical results.     Results from last 7 days  Lab Units 09/13/17  0346 09/11/17  1156   SODIUM mmol/L 138 144   POTASSIUM mmol/L 3.7 4.4   CHLORIDE mmol/L 107 104   CO2 mmol/L 24.0 31.0   BUN mg/dL 19 22   CREATININE mg/dL 0.70 0.80   CALCIUM mg/dL 8.4* 9.1   BILIRUBIN mg/dL  --  0.5   ALK PHOS U/L  --  65   ALT (SGPT) U/L  --  26   AST (SGOT) U/L  --  36*   GLUCOSE mg/dL 170* 103*       Results from last 7 days  Lab Units 09/13/17  0346 09/12/17  1231 09/11/17  1156   WBC 10*3/mm3 10.61 6.12 6.37   HEMOGLOBIN g/dL 9.9* 9.4* 12.8   HEMATOCRIT % 31.2* 30.0* 40.7   PLATELETS 10*3/mm3 130* 118* 157       Results from last 7 days  Lab Units 09/12/17  1639   PH, ARTERIAL pH units 7.452*   PO2 ART mm Hg 126.0*   PCO2, ARTERIAL mm Hg 37.9   HCO3 ART mmol/L 26.5*     No results found for: BLOODCX  Lab Results   Component Value Date    URINECX No growth 09/12/2017       I reviewed the patient's new imaging " including images and reports.        All medications reviewed.     aspirin 81 mg Oral QAM   atorvastatin 10 mg Oral Daily   cetirizine 5 mg Oral Daily   chlorhexidine 15 mL Mouth/Throat Q12H   cholecalciferol 1,000 Units Oral Daily   fentaNYL citrate (PF) 50 mcg Intravenous Once   insulin lispro 0-7 Units Subcutaneous 4x Daily AC & at Bedtime   levothyroxine 150 mcg Oral QAM   meloxicam 7.5 mg Oral Nightly   metoprolol tartrate 12.5 mg Oral Q12H   multivitamin with minerals 1 tablet Oral Daily   Daryl      pancrelipase (Lip-Prot-Amyl) 24,000 units of lipase Oral TID With Meals   pantoprazole 40 mg Oral QAM   propafenone 225 mg Oral Q12H   sennosides-docusate sodium 2 tablet Oral Nightly   vitamin B-12 500 mcg Oral Daily   warfarin 1.5 mg Oral Q48H   warfarin 3 mg Oral Q48H         Assessment/Plan     Principal Problem:    Non-rheumatic mitral regurgitation  Active Problems:    Atrial fibrillation    Hypertension    Hyperlipidemia    Hypothyroidism    Acute pericardial effusion    Respiratory failure, post-operative      #1 severe mitral regurgitation with lifestyle limiting dyspnea on exertion, status post mitral valve clip complicated by pericardial effusion requiring drainage. She is no longer hypotensive. She is having scant bloody output from her pericardial drain. She is in sinus tachycardia. Hemoglobin is 9.9. Repeat echocardiogram revealed no residual fluid and drain has been removed.    #2 postoperative respiratory failure, currently extubated on room air with pulse oximetry 96%. She has no underlying lung disease.     #3  history of proximal atrial fibrillation currently in sinus rhythm    #4 essential hypertension, controlled with metoprolol as an outpatient    #5  hypothyroidism, on replacement therapy    #6  hyperlipidemia, takes Pravachol as an outpatient with a normal lipid panel in June 2017        PLAN:  Mobilize  Encourage by mouth diet  Respirex  Monitor in the ICU for signs of reaccumulation of  fluid (narrow pulse pressure, hypotension, tachycardia, distended neck veins)  Coumadin  Aspirin, statin, beta blocker  Thyroid replacement    VTE Prophylaxis:foot pumps    Stress Ulcer Prophylaxis:protonix    Stacy Gibbons MD  09/13/17  3:06 PM

## 2017-09-14 VITALS
DIASTOLIC BLOOD PRESSURE: 55 MMHG | HEART RATE: 63 BPM | SYSTOLIC BLOOD PRESSURE: 95 MMHG | HEIGHT: 66 IN | OXYGEN SATURATION: 96 % | TEMPERATURE: 97.9 F | RESPIRATION RATE: 16 BRPM | BODY MASS INDEX: 17.04 KG/M2 | WEIGHT: 106 LBS

## 2017-09-14 LAB
BACTERIA SPEC AEROBE CULT: NORMAL
DEPRECATED RDW RBC AUTO: 54.4 FL (ref 37–54)
ERYTHROCYTE [DISTWIDTH] IN BLOOD BY AUTOMATED COUNT: 15.3 % (ref 11.3–14.5)
GLUCOSE BLDC GLUCOMTR-MCNC: 108 MG/DL (ref 70–130)
HCT VFR BLD AUTO: 31.1 % (ref 34.5–44)
HGB BLD-MCNC: 9.7 G/DL (ref 11.5–15.5)
INR PPP: 2.63
MCH RBC QN AUTO: 30.2 PG (ref 27–31)
MCHC RBC AUTO-ENTMCNC: 31.2 G/DL (ref 32–36)
MCV RBC AUTO: 96.9 FL (ref 80–99)
PLATELET # BLD AUTO: 126 10*3/MM3 (ref 150–450)
PMV BLD AUTO: 10.8 FL (ref 6–12)
PROTHROMBIN TIME: 29.5 SECONDS (ref 9.6–11.5)
RBC # BLD AUTO: 3.21 10*6/MM3 (ref 3.89–5.14)
WBC NRBC COR # BLD: 8.23 10*3/MM3 (ref 3.5–10.8)

## 2017-09-14 PROCEDURE — 99024 POSTOP FOLLOW-UP VISIT: CPT | Performed by: INTERNAL MEDICINE

## 2017-09-14 PROCEDURE — 85610 PROTHROMBIN TIME: CPT | Performed by: INTERNAL MEDICINE

## 2017-09-14 PROCEDURE — 82962 GLUCOSE BLOOD TEST: CPT

## 2017-09-14 PROCEDURE — 85027 COMPLETE CBC AUTOMATED: CPT | Performed by: INTERNAL MEDICINE

## 2017-09-14 RX ADMIN — CYANOCOBALAMIN TAB 1000 MCG 500 MCG: 1000 TAB at 08:22

## 2017-09-14 RX ADMIN — ASPIRIN 81 MG 81 MG: 81 TABLET ORAL at 06:25

## 2017-09-14 RX ADMIN — VITAMIN D, TAB 1000IU (100/BT) 1000 UNITS: 25 TAB at 08:17

## 2017-09-14 RX ADMIN — LEVOTHYROXINE SODIUM 150 MCG: 100 TABLET ORAL at 06:25

## 2017-09-14 RX ADMIN — CHLORHEXIDINE GLUCONATE 15 ML: 1.2 RINSE ORAL at 09:00

## 2017-09-14 RX ADMIN — METOPROLOL TARTRATE 12.5 MG: 25 TABLET, FILM COATED ORAL at 08:15

## 2017-09-14 RX ADMIN — CETIRIZINE HYDROCHLORIDE 5 MG: 10 TABLET, FILM COATED ORAL at 08:16

## 2017-09-14 RX ADMIN — PANCRELIPASE 24000 UNITS OF LIPASE: 24000; 76000; 120000 CAPSULE, DELAYED RELEASE PELLETS ORAL at 08:24

## 2017-09-14 RX ADMIN — MULTIPLE VITAMINS W/ MINERALS TAB 1 TABLET: TAB ORAL at 08:21

## 2017-09-14 RX ADMIN — PANTOPRAZOLE SODIUM 40 MG: 40 TABLET, DELAYED RELEASE ORAL at 06:25

## 2017-09-14 RX ADMIN — PROPAFENONE HYDROCHLORIDE 225 MG: 225 TABLET, FILM COATED ORAL at 01:00

## 2017-09-14 RX ADMIN — ATORVASTATIN CALCIUM 10 MG: 10 TABLET, FILM COATED ORAL at 08:17

## 2017-09-14 NOTE — PLAN OF CARE
Problem: Patient Care Overview (Adult)  Goal: Plan of Care Review  Outcome: Ongoing (interventions implemented as appropriate)    Problem: Cardiac Catheterization with/without PCI (Adult)  Goal: Signs and Symptoms of Listed Potential Problems Will be Absent or Manageable (Cardiac Catheterization with/without PCI)  Outcome: Ongoing (interventions implemented as appropriate)    09/13/17 1929   Cardiac Catheterization with/without PCI   Problems Assessed (Cardiac Catheterization) all   Problems Present (Cardiac Catheterization) none

## 2017-09-14 NOTE — PROGRESS NOTES
"[facility] Daily Note       LOS: 2 days   Patient Care Team:  Lubna Robertson MD as PCP - General (Internal Medicine)    Chief Complaint:  Mitral regurgitation    Subjective: No complaints this morning.  She ate very well yesterday but was not  that hungry this morning.  She feels good and wants to go home.  She has walked around the unit with no difficulty.  She lives with her daughter.    Review of Systems:   As above.    Medications:    aspirin 81 mg Oral QAM   atorvastatin 10 mg Oral Daily   cetirizine 5 mg Oral Daily   chlorhexidine 15 mL Mouth/Throat Q12H   cholecalciferol 1,000 Units Oral Daily   fentaNYL citrate (PF) 50 mcg Intravenous Once   insulin lispro 0-7 Units Subcutaneous 4x Daily AC & at Bedtime   levothyroxine 150 mcg Oral QAM   meloxicam 7.5 mg Oral Nightly   metoprolol tartrate 12.5 mg Oral Q12H   multivitamin with minerals 1 tablet Oral Daily   pancrelipase (Lip-Prot-Amyl) 24,000 units of lipase Oral TID With Meals   pantoprazole 40 mg Oral QAM   propafenone 225 mg Oral Q12H   sennosides-docusate sodium 2 tablet Oral Nightly   vitamin B-12 500 mcg Oral Daily   warfarin 1.5 mg Oral Q48H   warfarin 3 mg Oral Q48H       Vital Sign Min/Max for last 24 hours  Temp  Min: 97.3 °F (36.3 °C)  Max: 98.7 °F (37.1 °C)   BP  Min: 95/54  Max: 127/58   Pulse  Min: 72  Max: 86   Resp  Min: 16  Max: 24   SpO2  Min: 93 %  Max: 98 %   No Data Recorded   Weight  Min: 106 lb (48.1 kg)  Max: 106 lb (48.1 kg)      Intake/Output Summary (Last 24 hours) at 09/14/17 0803  Last data filed at 09/13/17 1800   Gross per 24 hour   Intake               33 ml   Output              550 ml   Net             -517 ml        Flowsheet Rows         First Filed Value    Admission Height  66\" (167.6 cm) Documented at 09/12/2017 0630    Admission Weight  106 lb 14.8 oz (48.5 kg) Documented at 09/12/2017 0630          Physical Exam:    General: Alert and oriented  Cardiovascular: Heart has a nondisplaced focal PMI. Regular " rate and rhythm without murmur, gallop or rub.  Lungs: Clear without rales or wheezes. Equal expansion is noted.   Abdomen: Soft  Extremities: Show no edema.   Skin: warm and dry.     Results Review:    I reviewed the patient's new clinical results.  EKG:  Tele:  A paced  Labs:      Results from last 7 days  Lab Units 09/13/17  0346 09/11/17  1156   SODIUM mmol/L 138 144   POTASSIUM mmol/L 3.7 4.4   CHLORIDE mmol/L 107 104   CO2 mmol/L 24.0 31.0   BUN mg/dL 19 22   CREATININE mg/dL 0.70 0.80   CALCIUM mg/dL 8.4* 9.1   BILIRUBIN mg/dL  --  0.5   ALK PHOS U/L  --  65   ALT (SGPT) U/L  --  26   AST (SGOT) U/L  --  36*   GLUCOSE mg/dL 170* 103*       Results from last 7 days  Lab Units 09/14/17  0415 09/13/17  0346 09/12/17  1231   WBC 10*3/mm3 8.23 10.61 6.12   HEMOGLOBIN g/dL 9.7* 9.9* 9.4*   HEMATOCRIT % 31.1* 31.2* 30.0*   PLATELETS 10*3/mm3 126* 130* 118*     No results found for: TROPONINI, TROPONINT  Lab Results   Component Value Date    CHOL 144 06/12/2017    CHOL 150 05/22/2017     Lab Results   Component Value Date    TRIG 87 06/12/2017    TRIG 77 05/22/2017     Lab Results   Component Value Date    HDL 60 06/12/2017    HDL 66 (H) 05/22/2017     No results found for: LDLCALC  Lab Results   Component Value Date    INR 2.63 09/14/2017    INR 2.14 09/13/2017    INR 1.88 09/12/2017    PROTIME 29.5 (H) 09/14/2017    PROTIME 23.9 (H) 09/13/2017    PROTIME 20.9 (H) 09/12/2017         Ejection Fraction:  60%      Assessment:    Severe mitral regurgitation now status post placement of a single  MitraClip  September 12, 2017 with residual mild mitral regurgitation.   Mean mitral valve gradient 7 mm following clip placement   MitraClip placement was complicated by a pericardial effusion following  transseptal puncture.  A pericardial drain is currently in place.  Paroxysmal atrial fibrillation on chronic Coumadin and propafenone therapy  Hypertension  Hyperlipidemia  Normal coronary arteries by catheterization May 22,  2017  Peripheral vascular disease status post stenting of the right ostial iliac artery using  an 8 x 17 express LD biliary stent      Plan:    Home today with no change in medication  Follow-up with me within echocardiogram in 1 month.  The patient is stable, appropriate for discharge home, and follow-up has been arranged.      Natasha Hanna MD  09/14/17  8:03 AM

## 2017-09-14 NOTE — NURSING NOTE
Briefly met with patient prior to discharge home.  Will coordinate one month post procedure echocardi[facility] and [facility] with her follow up scheduled 10/18/17.  She may call myself or Tracy Whitney RN with any questions or problems once home.  Pre-op Mitraclip NYHA class III symptoms.  Will re-evaluate in one month.

## 2017-09-14 NOTE — DISCHARGE SUMMARY
Discharge Summary  Marilee Judge  [id number]  6/3/1930    Date of Admission: 9/12/2017  Date of Discharge:  9/19/2017    PCP: Lubna Robertson MD  ATTENDING: Natasha Hanna MD      Consultants:  Intensivist Service      Discharge Diagnosis:   Patient Active Problem List    Diagnosis Date Noted   • Acute pericardial effusion 09/12/2017   • Respiratory failure, post-operative 09/12/2017   • *Non-rheumatic mitral regurgitation 05/30/2017   • Peripheral vascular disease 05/22/2017     Note Last Updated: 9/19/2017     PTCA and stent placement in the ostial right iliac artery.  8x17mm  Express LD biliary stent     • Dyspnea on exertion 05/15/2017   • Atrial fibrillation 05/09/2017     Note Last Updated: 5/9/2017     1. Atrial fibrillation/sick sinus syndrome:  a. 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  b. Status post dual-chamber Medtronic N Rhythm pacemaker, 09/08/2005 (implanted on the right side).  c. Rythmol therapy since, 09/08/2005.  d. Chronic anticoagulation with Coumadin with a CHADS score = 2.  e. Normal LV function and left atrial dimension by echocardiogram, July 2005.  f. Negative exercise Cardiolite, January 2008; negative adenosine Cardiolite, June 2005; negative stress echocardiogram, April 2004.  g. First-degree AV block.  h. Generator change, September 2011, in [place], [place].     • Hypertension 05/09/2017   • Hyperlipidemia 05/09/2017   • Hypothyroidism 05/09/2017     Note Last Updated: 5/9/2017     2. Hypothyroidism, on chronic replacement therapy.       • History of breast cancer 05/09/2017     Note Last Updated: 5/9/2017     3. History of breast cancer, status post left mastectomy, 1989.       • Osteoporosis 05/09/2017   • Migraine 05/09/2017   • GERD (gastroesophageal reflux disease) 05/09/2017   • RLS (restless legs syndrome) 05/09/2017       Presenting Problem/History of Present Illness  Mitral stenosis with regurgitation [I05.2]  Chronic diastolic  heart failure    Brief History: Ms. Martinez met with Dr. Hanna in May of this year for annual follow-up regarding atrial fibrillation, sick sinus syndrome and valvular heart disease.  She related that since early spring time she began to notice more short of breath especially with walking up a flight of stairs or laying on her left side.  She had frequent episodes of having to sit down and catch her breath.  She also admitted fleeting chest pains which would occur both with and without exertion. Subsequently she underwent cardiac catheterization and transesophageal echocardiogram which revealed normal coronary arteries but severe mitral valve regurgitation.  Severe right common iliac stenosis was identified and treated with PCI.  Later Ms. Martinez was evaluated by Dr. Torres Avina with regard to mitral clip versus open MVR.  She was deemed to be high risk operative candidate and given her age and frailty best suited for Mitral clip.  Mitral clip was attempted on 6/13/2017 but the procedure was aborted due to notation of large linear thrombus on the right atrial lead from the SVC.  She was then scheduled to return for mitral clip after 12 weeks of therapeutic anticoagulation with warfarin.    Hospital Course:  Ms. Judge was admitted the morning of the Mitraclip procedure via the CVOU.  She was taken to the cath lab where she was intubated and placed under general anesthesia per the anesthesia staff.  Ancef and 8 mg of Decadron IV were both given.  Both femoral artery areas were prepped in the usual sterile fashion.  Transesophageal echocardiographic imaging was performed by Dr. Harpal Neville.  The right femoral vein was accessed per Dr. Hanna and a 6 Mongolian hemostasis sheath was placed.  Next an 8 Mongolian long hemostasis sheath was placed in the left femoral vein with the assistance of Dr. Amaury Bryan.  12,000 units of intravenous heparin were administered for an ACT of 494.  ICE imaging and Rich  transseptal sheath were used to assist transseptal puncture a little less than 4 cm above the mitral valve.  Following transseptal puncture and placement of the Protrac wire, a small effusion began to develop.  This later increased in size to the point of becoming hemodynamically significant.  Pericardialcentesis was performed and pigtail catheter was placed within the pericardial space.  The blood was drained and was reinfused.    The right femoral vein was then dilated using 16 Hungarian dilator followed by 20 Hungarian dilator.  The mitral clip guide was placed across the intra-atrial septum under fluoroscopy and echocardiographic guidance.  The mitral clip was then placed through the guide and positioned appropriately about the jet between the A2-P2.  The clip was then placed below the leaflets.  Once both leaflets were grasped successfully, the clip was close to 60°.  Mitral regurgitation was measured revealing good reduction and then the clip was close to 20° at which time only mild mitral valve regurgitation remained.  Due to a gradient of 8 mm the clip was reopened slightly and subsequent gradient was noted to be 7mm Hg.  Tissue bridge was documented by echocardiography. No pericardial effusion was present at the conclusion of the case and LV systolic function was normal.  The patient was given 100 mg of Protamine for final ACT of 142.  Then the sheath was removed.  Venous access site was closed with 0 silk pursestring suture.  The 8 Hungarian venous sheath was to be pulled upon arrival in the cardiothoracic ICU.  Labs were made for the patient to remain intubated until she was confirmed to be stable post pericardial effusion.  Plans were made to remove pericardial pigtail the following morning after satisfactory echocardiogram.    Patient was received by the ICU staff and Intensivist physician, Dr. Gibbons.  She remained intubated and mechanically ventilated.  Telemetry showed sinus rhythm with a rate of 75  bpm.  Blood pressure 105/60, temperature 97.9, pulse oximetry 100% on 100% FiO2.  Hemoglobin and hematocrit were noted to be within normal range as were electrolytes and renal function.  Chest x-ray was satisfactory with the exception of bilateral pulmonary vascular congestion.  Lasix 20 mg IV was given and blood pressure parameters were prescribed advising the staff to utilize nitroglycerin IV to keep systolic pressure greater than 100 and less than 1 40 mmHg.  Echocardiogram planned for the a.m.  Patient was stable and tolerated extubation at 6:38 PM.    On postop day #1 she was awake and alert up in her chair.  Complained of minor sore throat but overall feeling well.  Hemoglobin and hematocrit mildly decreased at 9.9/31.2 respectively.  Platelet count 130 K.  Right internal jugular cordis and arterial line were discontinued.  Echocardiogram was performed and revealed trivial pericardial effusion,  normal left ventricular systolic function, and single Mitraclip in good position across the mitral valve leaflets.  The pericardial drain was removed without difficulty per Dr. Hanna at approximately 2 PM.  The stitch from the right femoral vein was also discontinued at this time.    On postop day #2 she was awake and had been ambulating without difficulty.  She was anxious to be discharged home.  She was experiencing no dyspnea and was hemodynamically stable.  Therefore she was deemed ready for discharge home with plans for one month post procedure follow up.         Procedures Performed  Procedure(s): Single Mitraclip placement between the A2-P2 segments of the mitral valve.        Pertinent Test Results:     Results from last 7 days  Lab Units 09/13/17  0346   SODIUM mmol/L 138   POTASSIUM mmol/L 3.7   CHLORIDE mmol/L 107   CO2 mmol/L 24.0   BUN mg/dL 19   CREATININE mg/dL 0.70   GLUCOSE mg/dL 170*   CALCIUM mg/dL 8.4*           Results from last 7 days  Lab Units 09/14/17  0415   WBC 10*3/mm3 8.23    HEMOGLOBIN g/dL 9.7*   HEMATOCRIT % 31.1*   PLATELETS 10*3/mm3 126*       Results from last 7 days  Lab Units 09/14/17  0415 09/13/17  0346 09/12/17  1231   INR  2.63 2.14 1.88                   Results for orders placed during the hospital encounter of 09/12/17   Adult Transthoracic Echo Limited    Narrative · Trivial pericardial effusion  · Normal left ventricular systolic function wall motion  · A single MitraClip is seen in position across the mitral leaflets.            Discharge Disposition  Home or Self Care:  Lives with daughter    Discharge Medications   Marilee Judge   Home Medication Instructions BISI:[id number]    Printed on:09/19/17 6507   Medication Information                      aspirin 81 MG tablet  Take 81 mg by mouth Every Morning.             Calcium Carb-Cholecalciferol (CALCIUM 600 + D PO)  Take 1 tablet by mouth 2 (Two) Times a Day.             cholecalciferol (VITAMIN D3) 1000 UNITS tablet  Take 1,000 Units by mouth Daily.             HYDROcodone-acetaminophen (NORCO) 5-325 MG per tablet  Take 1 tablet by mouth Every 6 (Six) Hours As Needed for Moderate Pain (4-6).             levothyroxine (LEVOXYL) 150 MCG tablet  Take 150 mcg by mouth Every Morning.             Loratadine (CLARITIN) 10 MG capsule  Take 10 mg by mouth Every Morning.             meloxicam (MOBIC) 7.5 MG tablet  Take 7.5 mg by mouth Every Night.             metoprolol succinate XL (TOPROL-XL) 25 MG 24 hr tablet  Take 25 mg by mouth Every Morning.             Multiple Vitamins-Minerals (CENTRUM ADULTS PO)  Take 1 tablet by mouth Daily.             omeprazole (priLOSEC) 40 MG capsule  Take 40 mg by mouth Every Morning.             pancrelipase, Lip-Prot-Amyl, (CREON) 35826 UNITS capsule delayed-release particles capsule  Take 24,000 units of lipase by mouth 3 (Three) Times a Day With Meals.             pravastatin (PRAVACHOL) 40 MG tablet  Take 40 mg by mouth Every Night.             propafenone (RYTHMOL) 225 MG  tablet  Take 225 mg by mouth Every 12 (Twelve) Hours.             rOPINIRole (REQUIP) 1 MG tablet  Take 1 mg by mouth Every Night. Take 1 hour before bedtime.              vitamin B-12 (CYANOCOBALAMIN) 500 MCG tablet  Take 500 mcg by mouth Daily.             warfarin (COUMADIN) 1 MG tablet  Take 1.5 mg by mouth Daily. As directed              warfarin (COUMADIN) 2 MG tablet  Take 3 mg by mouth Daily. As directed                  Discharge Diet:   Diet Instructions     Low salt, low saturated fat; Cardiac                Activity at Discharge: Ambulate ad emerita  Activity Instructions     Avoid strenuous activities such as jogging activities that cause breath-holding, grunting, straining such as lifting heavy objects for 30 days.  No driving until Cardiology follow-up in one month.  No lifting greater than 10 pounds for 1 week.    Call cardiology for any evidence of rash, bleeding or GI upset.  Notify dentist before any medical or dental procedures as preprocedure antibiotics will be needed for the next 6 weeks.    Weight daily and report weight gain of 3 pounds overnight and 5 pounds in one week.  Call Cardiology if this occurs.     Follow-up with the primary-care provider in one week.  Resume PCP surveillance and monitoring of PT/INR.          Follow-up Appointments  Future Appointments  Date Time Provider Department Center   10/18/2017 4:00 PM Natasha Hanna MD [facility] None     Thank you for allowing the [facility] service to care for Mrs. Marilee Judge.  Dr. Natasha Hanna may be reached at [phone].  Myriam VERMA at the [facility] at [phone].         LUCI Veronica  09/19/17  11:15 AM

## 2017-09-18 ENCOUNTER — TELEPHONE (OUTPATIENT)
Dept: CARDIOLOGY | Facility: CLINIC | Age: 82
End: 2017-09-18

## 2017-09-18 NOTE — TELEPHONE ENCOUNTER
PT s/p Mitral Clip last week- her daughter calls to report that she is SOA in the mornings when she wakes up but seems to get a little better throughout the day- daughter states that she c/o feeling weak- she had a 4 hours episode of Afib yesterday (i have asked her to send a transmission so that we can get more info)- she did not take anything extra for the episode-  No swelling, no weight gain- no BP readings to report-    I asked that they check her BP/HR for me-     Anything else? She isn't scheduled to come back until 10/18/17

## 2017-09-19 PROBLEM — I73.9 PERIPHERAL VASCULAR DISEASE (HCC): Status: ACTIVE | Noted: 2017-05-22

## 2017-09-22 NOTE — TELEPHONE ENCOUNTER
Called Nazia to discuss increasing Metoprolol- they are currently in [facility] ER waiting for transfer to [facility]- she had a bad day yesterday- and this morning she went on to the ER-

## 2017-09-24 ENCOUNTER — HOSPITAL ENCOUNTER (INPATIENT)
Facility: HOSPITAL | Age: 82
LOS: 5 days | Discharge: HOME | End: 2017-09-29
Attending: INTERNAL MEDICINE | Admitting: HOSPITALIST

## 2017-09-24 ENCOUNTER — APPOINTMENT (OUTPATIENT)
Dept: GENERAL RADIOLOGY | Facility: HOSPITAL | Age: 82
End: 2017-09-24

## 2017-09-24 DIAGNOSIS — I50.33 ACUTE ON CHRONIC DIASTOLIC HEART FAILURE (HCC): ICD-10-CM

## 2017-09-24 DIAGNOSIS — Z74.09 IMPAIRED FUNCTIONAL MOBILITY, BALANCE, GAIT, AND ENDURANCE: ICD-10-CM

## 2017-09-24 DIAGNOSIS — I48.20 CHRONIC ATRIAL FIBRILLATION (HCC): Primary | ICD-10-CM

## 2017-09-24 PROBLEM — J90 PLEURAL EFFUSION: Status: ACTIVE | Noted: 2017-09-24

## 2017-09-24 PROBLEM — J90 BILATERAL PLEURAL EFFUSION: Status: ACTIVE | Noted: 2017-09-24

## 2017-09-24 LAB
ALBUMIN SERPL-MCNC: 3.7 G/DL (ref 3.2–4.8)
ALBUMIN/GLOB SERPL: 1.2 G/DL (ref 1.5–2.5)
ALP SERPL-CCNC: 82 U/L (ref 25–100)
ALT SERPL W P-5'-P-CCNC: 28 U/L (ref 7–40)
ANION GAP SERPL CALCULATED.3IONS-SCNC: 5 MMOL/L (ref 3–11)
APTT PPP: 32.3 SECONDS (ref 24–31)
AST SERPL-CCNC: 30 U/L (ref 0–33)
BASOPHILS # BLD AUTO: 0.02 10*3/MM3 (ref 0–0.2)
BASOPHILS NFR BLD AUTO: 0.3 % (ref 0–1)
BILIRUB SERPL-MCNC: 0.6 MG/DL (ref 0.3–1.2)
BNP SERPL-MCNC: 725 PG/ML (ref 0–100)
BUN BLD-MCNC: 22 MG/DL (ref 9–23)
BUN/CREAT SERPL: 22 (ref 7–25)
CALCIUM SPEC-SCNC: 9.1 MG/DL (ref 8.7–10.4)
CHLORIDE SERPL-SCNC: 102 MMOL/L (ref 99–109)
CO2 SERPL-SCNC: 33 MMOL/L (ref 20–31)
CREAT BLD-MCNC: 1 MG/DL (ref 0.6–1.3)
DEPRECATED RDW RBC AUTO: 51.3 FL (ref 37–54)
EOSINOPHIL # BLD AUTO: 0.1 10*3/MM3 (ref 0–0.3)
EOSINOPHIL NFR BLD AUTO: 1.3 % (ref 0–3)
ERYTHROCYTE [DISTWIDTH] IN BLOOD BY AUTOMATED COUNT: 14.6 % (ref 11.3–14.5)
GFR SERPL CREATININE-BSD FRML MDRD: 52 ML/MIN/1.73
GLOBULIN UR ELPH-MCNC: 3 GM/DL
GLUCOSE BLD-MCNC: 103 MG/DL (ref 70–100)
HCT VFR BLD AUTO: 35.1 % (ref 34.5–44)
HGB BLD-MCNC: 11.2 G/DL (ref 11.5–15.5)
IMM GRANULOCYTES # BLD: 0.02 10*3/MM3 (ref 0–0.03)
IMM GRANULOCYTES NFR BLD: 0.3 % (ref 0–0.6)
INR PPP: 2.39
LYMPHOCYTES # BLD AUTO: 1.02 10*3/MM3 (ref 0.6–4.8)
LYMPHOCYTES NFR BLD AUTO: 13.5 % (ref 24–44)
MCH RBC QN AUTO: 30.6 PG (ref 27–31)
MCHC RBC AUTO-ENTMCNC: 31.9 G/DL (ref 32–36)
MCV RBC AUTO: 95.9 FL (ref 80–99)
MONOCYTES # BLD AUTO: 0.56 10*3/MM3 (ref 0–1)
MONOCYTES NFR BLD AUTO: 7.4 % (ref 0–12)
NEUTROPHILS # BLD AUTO: 5.82 10*3/MM3 (ref 1.5–8.3)
NEUTROPHILS NFR BLD AUTO: 77.2 % (ref 41–71)
PLATELET # BLD AUTO: 249 10*3/MM3 (ref 150–450)
PMV BLD AUTO: 9.5 FL (ref 6–12)
POTASSIUM BLD-SCNC: 3.7 MMOL/L (ref 3.5–5.5)
PROT SERPL-MCNC: 6.7 G/DL (ref 5.7–8.2)
PROTHROMBIN TIME: 26.8 SECONDS (ref 9.6–11.5)
RBC # BLD AUTO: 3.66 10*6/MM3 (ref 3.89–5.14)
SODIUM BLD-SCNC: 140 MMOL/L (ref 132–146)
TROPONIN I SERPL-MCNC: 0.02 NG/ML
TSH SERPL DL<=0.05 MIU/L-ACNC: 4.48 MIU/ML (ref 0.35–5.35)
WBC NRBC COR # BLD: 7.54 10*3/MM3 (ref 3.5–10.8)

## 2017-09-24 PROCEDURE — 93010 ELECTROCARDIOGRAM REPORT: CPT | Performed by: INTERNAL MEDICINE

## 2017-09-24 PROCEDURE — 71010 HC CHEST PA OR AP: CPT

## 2017-09-24 PROCEDURE — 85610 PROTHROMBIN TIME: CPT | Performed by: FAMILY MEDICINE

## 2017-09-24 PROCEDURE — 80053 COMPREHEN METABOLIC PANEL: CPT | Performed by: FAMILY MEDICINE

## 2017-09-24 PROCEDURE — 93005 ELECTROCARDIOGRAM TRACING: CPT | Performed by: FAMILY MEDICINE

## 2017-09-24 PROCEDURE — 84484 ASSAY OF TROPONIN QUANT: CPT | Performed by: FAMILY MEDICINE

## 2017-09-24 PROCEDURE — 85025 COMPLETE CBC W/AUTO DIFF WBC: CPT | Performed by: FAMILY MEDICINE

## 2017-09-24 PROCEDURE — 83880 ASSAY OF NATRIURETIC PEPTIDE: CPT | Performed by: FAMILY MEDICINE

## 2017-09-24 PROCEDURE — 85730 THROMBOPLASTIN TIME PARTIAL: CPT | Performed by: FAMILY MEDICINE

## 2017-09-24 PROCEDURE — 99223 1ST HOSP IP/OBS HIGH 75: CPT | Performed by: FAMILY MEDICINE

## 2017-09-24 PROCEDURE — 84443 ASSAY THYROID STIM HORMONE: CPT | Performed by: FAMILY MEDICINE

## 2017-09-24 RX ORDER — MAGNESIUM SULFATE HEPTAHYDRATE 40 MG/ML
2 INJECTION, SOLUTION INTRAVENOUS AS NEEDED
Status: DISCONTINUED | OUTPATIENT
Start: 2017-09-24 | End: 2017-09-29 | Stop reason: HOSPADM

## 2017-09-24 RX ORDER — ROPINIROLE 0.5 MG/1
1 TABLET, FILM COATED ORAL NIGHTLY
Status: DISCONTINUED | OUTPATIENT
Start: 2017-09-24 | End: 2017-09-29 | Stop reason: HOSPADM

## 2017-09-24 RX ORDER — FUROSEMIDE 40 MG/1
40 TABLET ORAL DAILY
Status: DISCONTINUED | OUTPATIENT
Start: 2017-09-25 | End: 2017-09-25

## 2017-09-24 RX ORDER — ATORVASTATIN CALCIUM 10 MG/1
10 TABLET, FILM COATED ORAL NIGHTLY
Status: DISCONTINUED | OUTPATIENT
Start: 2017-09-24 | End: 2017-09-29 | Stop reason: HOSPADM

## 2017-09-24 RX ORDER — ASPIRIN 81 MG/1
81 TABLET, CHEWABLE ORAL EVERY MORNING
Status: DISCONTINUED | OUTPATIENT
Start: 2017-09-25 | End: 2017-09-29 | Stop reason: HOSPADM

## 2017-09-24 RX ORDER — WARFARIN SODIUM 3 MG/1
1.5 TABLET ORAL
Status: DISCONTINUED | OUTPATIENT
Start: 2017-09-26 | End: 2017-09-27

## 2017-09-24 RX ORDER — MELATONIN
1000 DAILY
Status: DISCONTINUED | OUTPATIENT
Start: 2017-09-25 | End: 2017-09-29 | Stop reason: HOSPADM

## 2017-09-24 RX ORDER — METOPROLOL SUCCINATE 50 MG/1
50 TABLET, EXTENDED RELEASE ORAL
Status: DISCONTINUED | OUTPATIENT
Start: 2017-09-25 | End: 2017-09-25

## 2017-09-24 RX ORDER — LEVOTHYROXINE SODIUM 0.15 MG/1
150 TABLET ORAL EVERY MORNING
Status: DISCONTINUED | OUTPATIENT
Start: 2017-09-25 | End: 2017-09-29 | Stop reason: HOSPADM

## 2017-09-24 RX ORDER — POTASSIUM CHLORIDE 750 MG/1
40 CAPSULE, EXTENDED RELEASE ORAL AS NEEDED
Status: DISCONTINUED | OUTPATIENT
Start: 2017-09-24 | End: 2017-09-29 | Stop reason: HOSPADM

## 2017-09-24 RX ORDER — POTASSIUM CHLORIDE 7.45 MG/ML
10 INJECTION INTRAVENOUS
Status: DISCONTINUED | OUTPATIENT
Start: 2017-09-24 | End: 2017-09-29 | Stop reason: HOSPADM

## 2017-09-24 RX ORDER — MULTIPLE VITAMINS W/ MINERALS TAB 9MG-400MCG
1 TAB ORAL DAILY
Status: DISCONTINUED | OUTPATIENT
Start: 2017-09-25 | End: 2017-09-29 | Stop reason: HOSPADM

## 2017-09-24 RX ORDER — LANOLIN ALCOHOL/MO/W.PET/CERES
500 CREAM (GRAM) TOPICAL DAILY
Status: DISCONTINUED | OUTPATIENT
Start: 2017-09-25 | End: 2017-09-29 | Stop reason: HOSPADM

## 2017-09-24 RX ORDER — MAGNESIUM SULFATE HEPTAHYDRATE 40 MG/ML
4 INJECTION, SOLUTION INTRAVENOUS AS NEEDED
Status: DISCONTINUED | OUTPATIENT
Start: 2017-09-24 | End: 2017-09-29 | Stop reason: HOSPADM

## 2017-09-24 RX ORDER — PROPAFENONE HYDROCHLORIDE 225 MG/1
225 TABLET, FILM COATED ORAL EVERY 12 HOURS
Status: DISCONTINUED | OUTPATIENT
Start: 2017-09-24 | End: 2017-09-25

## 2017-09-24 RX ORDER — CETIRIZINE HYDROCHLORIDE 10 MG/1
5 TABLET ORAL DAILY
Status: DISCONTINUED | OUTPATIENT
Start: 2017-09-25 | End: 2017-09-29 | Stop reason: HOSPADM

## 2017-09-24 RX ORDER — WARFARIN SODIUM 3 MG/1
3 TABLET ORAL
Status: DISCONTINUED | OUTPATIENT
Start: 2017-09-24 | End: 2017-09-27

## 2017-09-24 RX ORDER — SODIUM CHLORIDE 0.9 % (FLUSH) 0.9 %
1-10 SYRINGE (ML) INJECTION AS NEEDED
Status: DISCONTINUED | OUTPATIENT
Start: 2017-09-24 | End: 2017-09-29 | Stop reason: HOSPADM

## 2017-09-24 RX ORDER — POTASSIUM CHLORIDE 1.5 G/1.77G
40 POWDER, FOR SOLUTION ORAL AS NEEDED
Status: DISCONTINUED | OUTPATIENT
Start: 2017-09-24 | End: 2017-09-29 | Stop reason: HOSPADM

## 2017-09-24 RX ORDER — HYDROCODONE BITARTRATE AND ACETAMINOPHEN 5; 325 MG/1; MG/1
1 TABLET ORAL EVERY 6 HOURS PRN
Status: DISCONTINUED | OUTPATIENT
Start: 2017-09-24 | End: 2017-09-29 | Stop reason: HOSPADM

## 2017-09-24 RX ORDER — MELOXICAM 7.5 MG/1
7.5 TABLET ORAL NIGHTLY
Status: DISCONTINUED | OUTPATIENT
Start: 2017-09-24 | End: 2017-09-26

## 2017-09-24 RX ORDER — PANTOPRAZOLE SODIUM 40 MG/1
40 TABLET, DELAYED RELEASE ORAL EVERY MORNING
Status: DISCONTINUED | OUTPATIENT
Start: 2017-09-25 | End: 2017-09-29 | Stop reason: HOSPADM

## 2017-09-24 RX ADMIN — MELOXICAM 7.5 MG: 7.5 TABLET ORAL at 20:11

## 2017-09-24 RX ADMIN — PANCRELIPASE 24000 UNITS OF LIPASE: 24000; 76000; 120000 CAPSULE, DELAYED RELEASE PELLETS ORAL at 17:14

## 2017-09-24 RX ADMIN — ROPINIROLE 1 MG: 0.5 TABLET, FILM COATED ORAL at 20:11

## 2017-09-24 RX ADMIN — WARFARIN SODIUM 3 MG: 3 TABLET ORAL at 17:14

## 2017-09-24 RX ADMIN — PROPAFENONE HYDROCHLORIDE 225 MG: 225 TABLET, FILM COATED ORAL at 17:14

## 2017-09-24 RX ADMIN — ATORVASTATIN CALCIUM 10 MG: 10 TABLET, FILM COATED ORAL at 20:11

## 2017-09-25 LAB
ANION GAP SERPL CALCULATED.3IONS-SCNC: 8 MMOL/L (ref 3–11)
BASOPHILS # BLD AUTO: 0.02 10*3/MM3 (ref 0–0.2)
BASOPHILS NFR BLD AUTO: 0.3 % (ref 0–1)
BUN BLD-MCNC: 23 MG/DL (ref 9–23)
BUN/CREAT SERPL: 23 (ref 7–25)
CALCIUM SPEC-SCNC: 8.9 MG/DL (ref 8.7–10.4)
CHLORIDE SERPL-SCNC: 100 MMOL/L (ref 99–109)
CO2 SERPL-SCNC: 32 MMOL/L (ref 20–31)
CREAT BLD-MCNC: 1 MG/DL (ref 0.6–1.3)
DEPRECATED RDW RBC AUTO: 51.3 FL (ref 37–54)
EOSINOPHIL # BLD AUTO: 0.18 10*3/MM3 (ref 0–0.3)
EOSINOPHIL NFR BLD AUTO: 2.7 % (ref 0–3)
ERYTHROCYTE [DISTWIDTH] IN BLOOD BY AUTOMATED COUNT: 14.6 % (ref 11.3–14.5)
GFR SERPL CREATININE-BSD FRML MDRD: 52 ML/MIN/1.73
GLUCOSE BLD-MCNC: 103 MG/DL (ref 70–100)
HCT VFR BLD AUTO: 35.6 % (ref 34.5–44)
HGB BLD-MCNC: 11.2 G/DL (ref 11.5–15.5)
IMM GRANULOCYTES # BLD: 0.01 10*3/MM3 (ref 0–0.03)
IMM GRANULOCYTES NFR BLD: 0.1 % (ref 0–0.6)
INR PPP: 2.7
LYMPHOCYTES # BLD AUTO: 0.99 10*3/MM3 (ref 0.6–4.8)
LYMPHOCYTES NFR BLD AUTO: 14.7 % (ref 24–44)
MCH RBC QN AUTO: 30.2 PG (ref 27–31)
MCHC RBC AUTO-ENTMCNC: 31.5 G/DL (ref 32–36)
MCV RBC AUTO: 96 FL (ref 80–99)
MONOCYTES # BLD AUTO: 0.59 10*3/MM3 (ref 0–1)
MONOCYTES NFR BLD AUTO: 8.8 % (ref 0–12)
NEUTROPHILS # BLD AUTO: 4.93 10*3/MM3 (ref 1.5–8.3)
NEUTROPHILS NFR BLD AUTO: 73.4 % (ref 41–71)
PLATELET # BLD AUTO: 253 10*3/MM3 (ref 150–450)
PMV BLD AUTO: 9.7 FL (ref 6–12)
POTASSIUM BLD-SCNC: 3.4 MMOL/L (ref 3.5–5.5)
PROTHROMBIN TIME: 30.4 SECONDS (ref 9.6–11.5)
RBC # BLD AUTO: 3.71 10*6/MM3 (ref 3.89–5.14)
SODIUM BLD-SCNC: 140 MMOL/L (ref 132–146)
WBC NRBC COR # BLD: 6.72 10*3/MM3 (ref 3.5–10.8)

## 2017-09-25 PROCEDURE — 99222 1ST HOSP IP/OBS MODERATE 55: CPT | Performed by: INTERNAL MEDICINE

## 2017-09-25 PROCEDURE — 85025 COMPLETE CBC W/AUTO DIFF WBC: CPT | Performed by: FAMILY MEDICINE

## 2017-09-25 PROCEDURE — 80048 BASIC METABOLIC PNL TOTAL CA: CPT | Performed by: FAMILY MEDICINE

## 2017-09-25 PROCEDURE — 85610 PROTHROMBIN TIME: CPT

## 2017-09-25 PROCEDURE — 99233 SBSQ HOSP IP/OBS HIGH 50: CPT | Performed by: INTERNAL MEDICINE

## 2017-09-25 PROCEDURE — 25010000002 DIGOXIN PER 500 MCG: Performed by: INTERNAL MEDICINE

## 2017-09-25 RX ORDER — BUMETANIDE 1 MG/1
1 TABLET ORAL DAILY
Status: DISCONTINUED | OUTPATIENT
Start: 2017-09-25 | End: 2017-09-29 | Stop reason: HOSPADM

## 2017-09-25 RX ORDER — PROPAFENONE HYDROCHLORIDE 225 MG/1
225 TABLET, FILM COATED ORAL EVERY 8 HOURS SCHEDULED
Status: DISCONTINUED | OUTPATIENT
Start: 2017-09-25 | End: 2017-09-27

## 2017-09-25 RX ORDER — DIGOXIN 0.25 MG/ML
250 INJECTION INTRAMUSCULAR; INTRAVENOUS ONCE
Status: COMPLETED | OUTPATIENT
Start: 2017-09-25 | End: 2017-09-25

## 2017-09-25 RX ADMIN — PANTOPRAZOLE SODIUM 40 MG: 40 TABLET, DELAYED RELEASE ORAL at 06:10

## 2017-09-25 RX ADMIN — BUMETANIDE 1 MG: 1 TABLET ORAL at 17:00

## 2017-09-25 RX ADMIN — LEVOTHYROXINE SODIUM 150 MCG: 150 TABLET ORAL at 06:10

## 2017-09-25 RX ADMIN — METOPROLOL SUCCINATE 50 MG: 50 TABLET, EXTENDED RELEASE ORAL at 08:04

## 2017-09-25 RX ADMIN — Medication 10 ML: at 08:05

## 2017-09-25 RX ADMIN — ATORVASTATIN CALCIUM 10 MG: 10 TABLET, FILM COATED ORAL at 21:41

## 2017-09-25 RX ADMIN — PANCRELIPASE 24000 UNITS OF LIPASE: 24000; 76000; 120000 CAPSULE, DELAYED RELEASE PELLETS ORAL at 08:03

## 2017-09-25 RX ADMIN — DIGOXIN 250 MCG: 0.25 INJECTION INTRAMUSCULAR; INTRAVENOUS at 17:00

## 2017-09-25 RX ADMIN — VITAMIN D, TAB 1000IU (100/BT) 1000 UNITS: 25 TAB at 08:05

## 2017-09-25 RX ADMIN — CETIRIZINE HYDROCHLORIDE 5 MG: 10 TABLET, FILM COATED ORAL at 08:03

## 2017-09-25 RX ADMIN — Medication 10 ML: at 17:01

## 2017-09-25 RX ADMIN — FUROSEMIDE 40 MG: 40 TABLET ORAL at 08:04

## 2017-09-25 RX ADMIN — MELOXICAM 7.5 MG: 7.5 TABLET ORAL at 21:41

## 2017-09-25 RX ADMIN — ASPIRIN 81 MG 81 MG: 81 TABLET ORAL at 06:10

## 2017-09-25 RX ADMIN — PROPAFENONE HYDROCHLORIDE 225 MG: 225 TABLET, FILM COATED ORAL at 17:01

## 2017-09-25 RX ADMIN — PROPAFENONE HYDROCHLORIDE 225 MG: 225 TABLET, FILM COATED ORAL at 06:10

## 2017-09-25 RX ADMIN — ROPINIROLE 1 MG: 0.5 TABLET, FILM COATED ORAL at 21:41

## 2017-09-25 RX ADMIN — PROPAFENONE HYDROCHLORIDE 225 MG: 225 TABLET, FILM COATED ORAL at 21:41

## 2017-09-25 RX ADMIN — PANCRELIPASE 24000 UNITS OF LIPASE: 24000; 76000; 120000 CAPSULE, DELAYED RELEASE PELLETS ORAL at 17:07

## 2017-09-25 RX ADMIN — MULTIPLE VITAMINS W/ MINERALS TAB 1 TABLET: TAB ORAL at 08:04

## 2017-09-25 RX ADMIN — PANCRELIPASE 24000 UNITS OF LIPASE: 24000; 76000; 120000 CAPSULE, DELAYED RELEASE PELLETS ORAL at 12:01

## 2017-09-25 RX ADMIN — WARFARIN SODIUM 3 MG: 3 TABLET ORAL at 17:01

## 2017-09-25 RX ADMIN — CYANOCOBALAMIN TAB 1000 MCG 500 MCG: 1000 TAB at 08:04

## 2017-09-25 RX ADMIN — METOPROLOL TARTRATE 12.5 MG: 25 TABLET, FILM COATED ORAL at 21:41

## 2017-09-26 ENCOUNTER — APPOINTMENT (OUTPATIENT)
Dept: GENERAL RADIOLOGY | Facility: HOSPITAL | Age: 82
End: 2017-09-26

## 2017-09-26 ENCOUNTER — APPOINTMENT (OUTPATIENT)
Dept: CT IMAGING | Facility: HOSPITAL | Age: 82
End: 2017-09-26

## 2017-09-26 LAB
ALBUMIN SERPL-MCNC: 3.7 G/DL (ref 3.2–4.8)
ALBUMIN/GLOB SERPL: 1.5 G/DL (ref 1.5–2.5)
ALP SERPL-CCNC: 74 U/L (ref 25–100)
ALT SERPL W P-5'-P-CCNC: 26 U/L (ref 7–40)
ANION GAP SERPL CALCULATED.3IONS-SCNC: 5 MMOL/L (ref 3–11)
ANION GAP SERPL CALCULATED.3IONS-SCNC: 7 MMOL/L (ref 3–11)
AST SERPL-CCNC: 28 U/L (ref 0–33)
BASOPHILS # BLD AUTO: 0.02 10*3/MM3 (ref 0–0.2)
BASOPHILS NFR BLD AUTO: 0.2 % (ref 0–1)
BILIRUB SERPL-MCNC: 0.4 MG/DL (ref 0.3–1.2)
BNP SERPL-MCNC: 307 PG/ML (ref 0–100)
BUN BLD-MCNC: 26 MG/DL (ref 9–23)
BUN BLD-MCNC: 26 MG/DL (ref 9–23)
BUN/CREAT SERPL: 23.6 (ref 7–25)
BUN/CREAT SERPL: 26 (ref 7–25)
CALCIUM SPEC-SCNC: 8.8 MG/DL (ref 8.7–10.4)
CALCIUM SPEC-SCNC: 9.1 MG/DL (ref 8.7–10.4)
CHLORIDE SERPL-SCNC: 100 MMOL/L (ref 99–109)
CHLORIDE SERPL-SCNC: 101 MMOL/L (ref 99–109)
CO2 SERPL-SCNC: 32 MMOL/L (ref 20–31)
CO2 SERPL-SCNC: 33 MMOL/L (ref 20–31)
CREAT BLD-MCNC: 1 MG/DL (ref 0.6–1.3)
CREAT BLD-MCNC: 1.1 MG/DL (ref 0.6–1.3)
DEPRECATED RDW RBC AUTO: 51.6 FL (ref 37–54)
DEPRECATED RDW RBC AUTO: 52.1 FL (ref 37–54)
EOSINOPHIL # BLD AUTO: 0.29 10*3/MM3 (ref 0–0.3)
EOSINOPHIL NFR BLD AUTO: 2.9 % (ref 0–3)
ERYTHROCYTE [DISTWIDTH] IN BLOOD BY AUTOMATED COUNT: 14.5 % (ref 11.3–14.5)
ERYTHROCYTE [DISTWIDTH] IN BLOOD BY AUTOMATED COUNT: 14.6 % (ref 11.3–14.5)
GFR SERPL CREATININE-BSD FRML MDRD: 47 ML/MIN/1.73
GFR SERPL CREATININE-BSD FRML MDRD: 52 ML/MIN/1.73
GLOBULIN UR ELPH-MCNC: 2.5 GM/DL
GLUCOSE BLD-MCNC: 103 MG/DL (ref 70–100)
GLUCOSE BLD-MCNC: 94 MG/DL (ref 70–100)
GLUCOSE BLDC GLUCOMTR-MCNC: 106 MG/DL (ref 70–130)
HCT VFR BLD AUTO: 34.5 % (ref 34.5–44)
HCT VFR BLD AUTO: 35.6 % (ref 34.5–44)
HGB BLD-MCNC: 10.6 G/DL (ref 11.5–15.5)
HGB BLD-MCNC: 11.1 G/DL (ref 11.5–15.5)
IMM GRANULOCYTES # BLD: 0.01 10*3/MM3 (ref 0–0.03)
IMM GRANULOCYTES NFR BLD: 0.1 % (ref 0–0.6)
INR PPP: 3.31
LYMPHOCYTES # BLD AUTO: 1.04 10*3/MM3 (ref 0.6–4.8)
LYMPHOCYTES NFR BLD AUTO: 10.5 % (ref 24–44)
MAGNESIUM SERPL-MCNC: 1.8 MG/DL (ref 1.3–2.7)
MCH RBC QN AUTO: 29.8 PG (ref 27–31)
MCH RBC QN AUTO: 29.9 PG (ref 27–31)
MCHC RBC AUTO-ENTMCNC: 30.7 G/DL (ref 32–36)
MCHC RBC AUTO-ENTMCNC: 31.2 G/DL (ref 32–36)
MCV RBC AUTO: 96 FL (ref 80–99)
MCV RBC AUTO: 96.9 FL (ref 80–99)
MONOCYTES # BLD AUTO: 0.91 10*3/MM3 (ref 0–1)
MONOCYTES NFR BLD AUTO: 9.1 % (ref 0–12)
NEUTROPHILS # BLD AUTO: 7.68 10*3/MM3 (ref 1.5–8.3)
NEUTROPHILS NFR BLD AUTO: 77.2 % (ref 41–71)
PLATELET # BLD AUTO: 242 10*3/MM3 (ref 150–450)
PLATELET # BLD AUTO: 252 10*3/MM3 (ref 150–450)
PMV BLD AUTO: 9.4 FL (ref 6–12)
PMV BLD AUTO: 9.7 FL (ref 6–12)
POTASSIUM BLD-SCNC: 3.5 MMOL/L (ref 3.5–5.5)
POTASSIUM BLD-SCNC: 3.7 MMOL/L (ref 3.5–5.5)
PROT SERPL-MCNC: 6.2 G/DL (ref 5.7–8.2)
PROTHROMBIN TIME: 37.4 SECONDS (ref 9.6–11.5)
RBC # BLD AUTO: 3.56 10*6/MM3 (ref 3.89–5.14)
RBC # BLD AUTO: 3.71 10*6/MM3 (ref 3.89–5.14)
SODIUM BLD-SCNC: 139 MMOL/L (ref 132–146)
SODIUM BLD-SCNC: 139 MMOL/L (ref 132–146)
TROPONIN I SERPL-MCNC: 0.02 NG/ML
WBC NRBC COR # BLD: 7.82 10*3/MM3 (ref 3.5–10.8)
WBC NRBC COR # BLD: 9.95 10*3/MM3 (ref 3.5–10.8)

## 2017-09-26 PROCEDURE — 93005 ELECTROCARDIOGRAM TRACING: CPT | Performed by: FAMILY MEDICINE

## 2017-09-26 PROCEDURE — 25010000002 DIGOXIN PER 500 MCG: Performed by: NURSE PRACTITIONER

## 2017-09-26 PROCEDURE — 85610 PROTHROMBIN TIME: CPT

## 2017-09-26 PROCEDURE — 85027 COMPLETE CBC AUTOMATED: CPT | Performed by: INTERNAL MEDICINE

## 2017-09-26 PROCEDURE — 71260 CT THORAX DX C+: CPT

## 2017-09-26 PROCEDURE — 93005 ELECTROCARDIOGRAM TRACING: CPT | Performed by: INTERNAL MEDICINE

## 2017-09-26 PROCEDURE — 99232 SBSQ HOSP IP/OBS MODERATE 35: CPT | Performed by: INTERNAL MEDICINE

## 2017-09-26 PROCEDURE — 94799 UNLISTED PULMONARY SVC/PX: CPT

## 2017-09-26 PROCEDURE — 84484 ASSAY OF TROPONIN QUANT: CPT | Performed by: FAMILY MEDICINE

## 2017-09-26 PROCEDURE — 71020 HC CHEST PA AND LATERAL: CPT

## 2017-09-26 PROCEDURE — 83880 ASSAY OF NATRIURETIC PEPTIDE: CPT | Performed by: FAMILY MEDICINE

## 2017-09-26 PROCEDURE — 99291 CRITICAL CARE FIRST HOUR: CPT | Performed by: FAMILY MEDICINE

## 2017-09-26 PROCEDURE — 0 IOPAMIDOL 61 % SOLUTION: Performed by: INTERNAL MEDICINE

## 2017-09-26 PROCEDURE — 80053 COMPREHEN METABOLIC PANEL: CPT | Performed by: INTERNAL MEDICINE

## 2017-09-26 PROCEDURE — 85025 COMPLETE CBC W/AUTO DIFF WBC: CPT | Performed by: FAMILY MEDICINE

## 2017-09-26 PROCEDURE — 82962 GLUCOSE BLOOD TEST: CPT

## 2017-09-26 PROCEDURE — 83735 ASSAY OF MAGNESIUM: CPT | Performed by: FAMILY MEDICINE

## 2017-09-26 PROCEDURE — 93010 ELECTROCARDIOGRAM REPORT: CPT | Performed by: INTERNAL MEDICINE

## 2017-09-26 RX ORDER — NITROGLYCERIN 0.4 MG/1
TABLET SUBLINGUAL
Status: COMPLETED | OUTPATIENT
Start: 2017-09-26 | End: 2017-09-26

## 2017-09-26 RX ORDER — DIGOXIN 0.25 MG/ML
250 INJECTION INTRAMUSCULAR; INTRAVENOUS ONCE
Status: COMPLETED | OUTPATIENT
Start: 2017-09-26 | End: 2017-09-26

## 2017-09-26 RX ADMIN — NITROGLYCERIN 0.4 MG: 0.4 TABLET SUBLINGUAL at 09:57

## 2017-09-26 RX ADMIN — BUMETANIDE 1 MG: 1 TABLET ORAL at 09:02

## 2017-09-26 RX ADMIN — LEVOTHYROXINE SODIUM 150 MCG: 150 TABLET ORAL at 06:28

## 2017-09-26 RX ADMIN — NITROGLYCERIN 0.4 MG: 0.4 TABLET SUBLINGUAL at 22:02

## 2017-09-26 RX ADMIN — PROPAFENONE HYDROCHLORIDE 225 MG: 225 TABLET, FILM COATED ORAL at 14:57

## 2017-09-26 RX ADMIN — DIGOXIN 250 MCG: 0.25 INJECTION INTRAMUSCULAR; INTRAVENOUS at 13:38

## 2017-09-26 RX ADMIN — PANTOPRAZOLE SODIUM 40 MG: 40 TABLET, DELAYED RELEASE ORAL at 06:27

## 2017-09-26 RX ADMIN — CYANOCOBALAMIN TAB 1000 MCG 500 MCG: 1000 TAB at 09:04

## 2017-09-26 RX ADMIN — METOPROLOL TARTRATE 12.5 MG: 25 TABLET, FILM COATED ORAL at 21:17

## 2017-09-26 RX ADMIN — PANCRELIPASE 24000 UNITS OF LIPASE: 24000; 76000; 120000 CAPSULE, DELAYED RELEASE PELLETS ORAL at 09:03

## 2017-09-26 RX ADMIN — METOPROLOL TARTRATE 12.5 MG: 25 TABLET, FILM COATED ORAL at 09:03

## 2017-09-26 RX ADMIN — ATORVASTATIN CALCIUM 10 MG: 10 TABLET, FILM COATED ORAL at 21:17

## 2017-09-26 RX ADMIN — ASPIRIN 81 MG 81 MG: 81 TABLET ORAL at 06:28

## 2017-09-26 RX ADMIN — MELOXICAM 7.5 MG: 7.5 TABLET ORAL at 21:16

## 2017-09-26 RX ADMIN — VITAMIN D, TAB 1000IU (100/BT) 1000 UNITS: 25 TAB at 09:02

## 2017-09-26 RX ADMIN — IOPAMIDOL 60 ML: 612 INJECTION, SOLUTION INTRAVENOUS at 22:43

## 2017-09-26 RX ADMIN — MULTIPLE VITAMINS W/ MINERALS TAB 1 TABLET: TAB ORAL at 09:02

## 2017-09-26 RX ADMIN — CETIRIZINE HYDROCHLORIDE 5 MG: 10 TABLET, FILM COATED ORAL at 09:03

## 2017-09-26 RX ADMIN — PROPAFENONE HYDROCHLORIDE 225 MG: 225 TABLET, FILM COATED ORAL at 21:16

## 2017-09-26 RX ADMIN — PROPAFENONE HYDROCHLORIDE 225 MG: 225 TABLET, FILM COATED ORAL at 06:27

## 2017-09-26 RX ADMIN — PANCRELIPASE 24000 UNITS OF LIPASE: 24000; 76000; 120000 CAPSULE, DELAYED RELEASE PELLETS ORAL at 13:39

## 2017-09-26 RX ADMIN — ROPINIROLE 1 MG: 0.5 TABLET, FILM COATED ORAL at 21:17

## 2017-09-26 RX ADMIN — PANCRELIPASE 24000 UNITS OF LIPASE: 24000; 76000; 120000 CAPSULE, DELAYED RELEASE PELLETS ORAL at 18:27

## 2017-09-26 RX ADMIN — HYDROCODONE BITARTRATE AND ACETAMINOPHEN 1 TABLET: 5; 325 TABLET ORAL at 22:08

## 2017-09-27 ENCOUNTER — APPOINTMENT (OUTPATIENT)
Dept: GENERAL RADIOLOGY | Facility: HOSPITAL | Age: 82
End: 2017-09-27

## 2017-09-27 ENCOUNTER — APPOINTMENT (OUTPATIENT)
Dept: CARDIOLOGY | Facility: HOSPITAL | Age: 82
End: 2017-09-27

## 2017-09-27 LAB
ANION GAP SERPL CALCULATED.3IONS-SCNC: 4 MMOL/L (ref 3–11)
BUN BLD-MCNC: 26 MG/DL (ref 9–23)
BUN/CREAT SERPL: 28.9 (ref 7–25)
CALCIUM SPEC-SCNC: 8.8 MG/DL (ref 8.7–10.4)
CHLORIDE SERPL-SCNC: 102 MMOL/L (ref 99–109)
CO2 SERPL-SCNC: 32 MMOL/L (ref 20–31)
CREAT BLD-MCNC: 0.9 MG/DL (ref 0.6–1.3)
GFR SERPL CREATININE-BSD FRML MDRD: 59 ML/MIN/1.73
GLUCOSE BLD-MCNC: 95 MG/DL (ref 70–100)
INR PPP: 3.16
POTASSIUM BLD-SCNC: 3.8 MMOL/L (ref 3.5–5.5)
PROTHROMBIN TIME: 35.7 SECONDS (ref 9.6–11.5)
SODIUM BLD-SCNC: 138 MMOL/L (ref 132–146)
TROPONIN I SERPL-MCNC: 0.03 NG/ML
TROPONIN I SERPL-MCNC: 0.03 NG/ML

## 2017-09-27 PROCEDURE — 5A2204Z RESTORATION OF CARDIAC RHYTHM, SINGLE: ICD-10-PCS | Performed by: INTERNAL MEDICINE

## 2017-09-27 PROCEDURE — 93010 ELECTROCARDIOGRAM REPORT: CPT | Performed by: INTERNAL MEDICINE

## 2017-09-27 PROCEDURE — 99232 SBSQ HOSP IP/OBS MODERATE 35: CPT | Performed by: INTERNAL MEDICINE

## 2017-09-27 PROCEDURE — 25010000002 MIDAZOLAM PER 1 MG: Performed by: INTERNAL MEDICINE

## 2017-09-27 PROCEDURE — 92960 CARDIOVERSION ELECTRIC EXT: CPT | Performed by: INTERNAL MEDICINE

## 2017-09-27 PROCEDURE — 92960 CARDIOVERSION ELECTRIC EXT: CPT

## 2017-09-27 PROCEDURE — 84484 ASSAY OF TROPONIN QUANT: CPT | Performed by: FAMILY MEDICINE

## 2017-09-27 PROCEDURE — 80048 BASIC METABOLIC PNL TOTAL CA: CPT | Performed by: INTERNAL MEDICINE

## 2017-09-27 PROCEDURE — 99232 SBSQ HOSP IP/OBS MODERATE 35: CPT | Performed by: NURSE PRACTITIONER

## 2017-09-27 PROCEDURE — 93005 ELECTROCARDIOGRAM TRACING: CPT | Performed by: FAMILY MEDICINE

## 2017-09-27 PROCEDURE — 71010 HC CHEST PA OR AP: CPT

## 2017-09-27 PROCEDURE — 25010000002 AMIODARONE IN DEXTROSE 5% 150-4.21 MG/100ML-% SOLUTION: Performed by: INTERNAL MEDICINE

## 2017-09-27 PROCEDURE — 93005 ELECTROCARDIOGRAM TRACING: CPT | Performed by: INTERNAL MEDICINE

## 2017-09-27 PROCEDURE — 85610 PROTHROMBIN TIME: CPT

## 2017-09-27 RX ORDER — FLUMAZENIL 0.1 MG/ML
INJECTION INTRAVENOUS
Status: DISCONTINUED
Start: 2017-09-27 | End: 2017-09-27 | Stop reason: WASHOUT

## 2017-09-27 RX ORDER — WARFARIN SODIUM 3 MG/1
3 TABLET ORAL
COMMUNITY
End: 2017-09-29 | Stop reason: HOSPADM

## 2017-09-27 RX ORDER — MIDAZOLAM HYDROCHLORIDE 1 MG/ML
INJECTION INTRAMUSCULAR; INTRAVENOUS
Status: DISCONTINUED
Start: 2017-09-27 | End: 2017-09-29 | Stop reason: HOSPADM

## 2017-09-27 RX ORDER — FENTANYL CITRATE 50 UG/ML
INJECTION, SOLUTION INTRAMUSCULAR; INTRAVENOUS
Status: DISCONTINUED
Start: 2017-09-27 | End: 2017-09-27 | Stop reason: WASHOUT

## 2017-09-27 RX ORDER — WARFARIN SODIUM 2 MG/1
2 TABLET ORAL
Status: DISCONTINUED | OUTPATIENT
Start: 2017-09-27 | End: 2017-09-29 | Stop reason: HOSPADM

## 2017-09-27 RX ORDER — MIDAZOLAM HYDROCHLORIDE 1 MG/ML
INJECTION INTRAMUSCULAR; INTRAVENOUS
Status: COMPLETED | OUTPATIENT
Start: 2017-09-27 | End: 2017-09-27

## 2017-09-27 RX ORDER — NALOXONE HYDROCHLORIDE 0.4 MG/ML
INJECTION, SOLUTION INTRAMUSCULAR; INTRAVENOUS; SUBCUTANEOUS
Status: DISCONTINUED
Start: 2017-09-27 | End: 2017-09-27 | Stop reason: WASHOUT

## 2017-09-27 RX ADMIN — CETIRIZINE HYDROCHLORIDE 5 MG: 10 TABLET, FILM COATED ORAL at 08:54

## 2017-09-27 RX ADMIN — METHOHEXITAL SODIUM 20 MG: 500 INJECTION, POWDER, LYOPHILIZED, FOR SOLUTION INTRAMUSCULAR; INTRAVENOUS; RECTAL at 12:29

## 2017-09-27 RX ADMIN — CYANOCOBALAMIN TAB 1000 MCG 500 MCG: 1000 TAB at 08:54

## 2017-09-27 RX ADMIN — WARFARIN SODIUM 2 MG: 2 TABLET ORAL at 18:04

## 2017-09-27 RX ADMIN — ASPIRIN 81 MG 81 MG: 81 TABLET ORAL at 06:31

## 2017-09-27 RX ADMIN — MULTIPLE VITAMINS W/ MINERALS TAB 1 TABLET: TAB ORAL at 08:53

## 2017-09-27 RX ADMIN — ATORVASTATIN CALCIUM 10 MG: 10 TABLET, FILM COATED ORAL at 21:29

## 2017-09-27 RX ADMIN — LEVOTHYROXINE SODIUM 150 MCG: 150 TABLET ORAL at 06:31

## 2017-09-27 RX ADMIN — HYDROCODONE BITARTRATE AND ACETAMINOPHEN 1 TABLET: 5; 325 TABLET ORAL at 08:51

## 2017-09-27 RX ADMIN — PANCRELIPASE 24000 UNITS OF LIPASE: 24000; 76000; 120000 CAPSULE, DELAYED RELEASE PELLETS ORAL at 08:53

## 2017-09-27 RX ADMIN — VITAMIN D, TAB 1000IU (100/BT) 1000 UNITS: 25 TAB at 08:51

## 2017-09-27 RX ADMIN — PROPAFENONE HYDROCHLORIDE 225 MG: 225 TABLET, FILM COATED ORAL at 06:31

## 2017-09-27 RX ADMIN — BUMETANIDE 1 MG: 1 TABLET ORAL at 08:50

## 2017-09-27 RX ADMIN — MIDAZOLAM HYDROCHLORIDE 2 MG: 1 INJECTION, SOLUTION INTRAMUSCULAR; INTRAVENOUS at 12:29

## 2017-09-27 RX ADMIN — METOPROLOL TARTRATE 12.5 MG: 25 TABLET, FILM COATED ORAL at 08:52

## 2017-09-27 RX ADMIN — AMIODARONE HYDROCHLORIDE 150 MG: 1.5 INJECTION, SOLUTION INTRAVENOUS at 15:59

## 2017-09-27 RX ADMIN — PANCRELIPASE 24000 UNITS OF LIPASE: 24000; 76000; 120000 CAPSULE, DELAYED RELEASE PELLETS ORAL at 18:04

## 2017-09-27 RX ADMIN — PANTOPRAZOLE SODIUM 40 MG: 40 TABLET, DELAYED RELEASE ORAL at 06:31

## 2017-09-27 RX ADMIN — ROPINIROLE 1 MG: 0.5 TABLET, FILM COATED ORAL at 21:29

## 2017-09-28 LAB
INR PPP: 2.31
PROTHROMBIN TIME: 25.8 SECONDS (ref 9.6–11.5)

## 2017-09-28 PROCEDURE — 97161 PT EVAL LOW COMPLEX 20 MIN: CPT

## 2017-09-28 PROCEDURE — 85610 PROTHROMBIN TIME: CPT

## 2017-09-28 PROCEDURE — 99232 SBSQ HOSP IP/OBS MODERATE 35: CPT | Performed by: NURSE PRACTITIONER

## 2017-09-28 PROCEDURE — 99232 SBSQ HOSP IP/OBS MODERATE 35: CPT | Performed by: HOSPITALIST

## 2017-09-28 RX ORDER — AMIODARONE HYDROCHLORIDE 200 MG/1
200 TABLET ORAL EVERY 12 HOURS SCHEDULED
Status: DISCONTINUED | OUTPATIENT
Start: 2017-09-28 | End: 2017-09-29 | Stop reason: HOSPADM

## 2017-09-28 RX ADMIN — ATORVASTATIN CALCIUM 10 MG: 10 TABLET, FILM COATED ORAL at 21:49

## 2017-09-28 RX ADMIN — AMIODARONE HYDROCHLORIDE 200 MG: 200 TABLET ORAL at 21:49

## 2017-09-28 RX ADMIN — WARFARIN SODIUM 2 MG: 2 TABLET ORAL at 17:03

## 2017-09-28 RX ADMIN — PANCRELIPASE 24000 UNITS OF LIPASE: 24000; 76000; 120000 CAPSULE, DELAYED RELEASE PELLETS ORAL at 17:03

## 2017-09-28 RX ADMIN — CYANOCOBALAMIN TAB 1000 MCG 500 MCG: 1000 TAB at 08:38

## 2017-09-28 RX ADMIN — BUMETANIDE 1 MG: 1 TABLET ORAL at 08:38

## 2017-09-28 RX ADMIN — CETIRIZINE HYDROCHLORIDE 5 MG: 10 TABLET, FILM COATED ORAL at 08:38

## 2017-09-28 RX ADMIN — MULTIPLE VITAMINS W/ MINERALS TAB 1 TABLET: TAB ORAL at 08:37

## 2017-09-28 RX ADMIN — VITAMIN D, TAB 1000IU (100/BT) 1000 UNITS: 25 TAB at 08:38

## 2017-09-28 RX ADMIN — PANCRELIPASE 24000 UNITS OF LIPASE: 24000; 76000; 120000 CAPSULE, DELAYED RELEASE PELLETS ORAL at 08:37

## 2017-09-28 RX ADMIN — PANCRELIPASE 24000 UNITS OF LIPASE: 24000; 76000; 120000 CAPSULE, DELAYED RELEASE PELLETS ORAL at 12:10

## 2017-09-28 RX ADMIN — ASPIRIN 81 MG 81 MG: 81 TABLET ORAL at 06:08

## 2017-09-28 RX ADMIN — METOPROLOL TARTRATE 12.5 MG: 25 TABLET, FILM COATED ORAL at 08:37

## 2017-09-28 RX ADMIN — LEVOTHYROXINE SODIUM 150 MCG: 150 TABLET ORAL at 06:08

## 2017-09-28 RX ADMIN — AMIODARONE HYDROCHLORIDE 200 MG: 200 TABLET ORAL at 08:38

## 2017-09-28 RX ADMIN — PANTOPRAZOLE SODIUM 40 MG: 40 TABLET, DELAYED RELEASE ORAL at 06:08

## 2017-09-28 RX ADMIN — ROPINIROLE 1 MG: 0.5 TABLET, FILM COATED ORAL at 21:49

## 2017-09-29 VITALS
TEMPERATURE: 97.7 F | WEIGHT: 113.38 LBS | RESPIRATION RATE: 16 BRPM | SYSTOLIC BLOOD PRESSURE: 130 MMHG | BODY MASS INDEX: 18.89 KG/M2 | HEART RATE: 92 BPM | HEIGHT: 65 IN | OXYGEN SATURATION: 98 % | DIASTOLIC BLOOD PRESSURE: 67 MMHG

## 2017-09-29 LAB
ALBUMIN SERPL-MCNC: 3.6 G/DL (ref 3.2–4.8)
ANION GAP SERPL CALCULATED.3IONS-SCNC: 8 MMOL/L (ref 3–11)
BUN BLD-MCNC: 22 MG/DL (ref 9–23)
BUN/CREAT SERPL: 27.5 (ref 7–25)
CALCIUM SPEC-SCNC: 9 MG/DL (ref 8.7–10.4)
CHLORIDE SERPL-SCNC: 104 MMOL/L (ref 99–109)
CO2 SERPL-SCNC: 29 MMOL/L (ref 20–31)
CREAT BLD-MCNC: 0.8 MG/DL (ref 0.6–1.3)
DEPRECATED RDW RBC AUTO: 50.8 FL (ref 37–54)
ERYTHROCYTE [DISTWIDTH] IN BLOOD BY AUTOMATED COUNT: 14.6 % (ref 11.3–14.5)
GFR SERPL CREATININE-BSD FRML MDRD: 68 ML/MIN/1.73
GLUCOSE BLD-MCNC: 104 MG/DL (ref 70–100)
HCT VFR BLD AUTO: 33.7 % (ref 34.5–44)
HGB BLD-MCNC: 10.5 G/DL (ref 11.5–15.5)
INR PPP: 2.08
MCH RBC QN AUTO: 29.7 PG (ref 27–31)
MCHC RBC AUTO-ENTMCNC: 31.2 G/DL (ref 32–36)
MCV RBC AUTO: 95.5 FL (ref 80–99)
PHOSPHATE SERPL-MCNC: 2.8 MG/DL (ref 2.4–5.1)
PLATELET # BLD AUTO: 205 10*3/MM3 (ref 150–450)
PMV BLD AUTO: 9.6 FL (ref 6–12)
POTASSIUM BLD-SCNC: 3.6 MMOL/L (ref 3.5–5.5)
PROTHROMBIN TIME: 23.2 SECONDS (ref 9.6–11.5)
RBC # BLD AUTO: 3.53 10*6/MM3 (ref 3.89–5.14)
SODIUM BLD-SCNC: 141 MMOL/L (ref 132–146)
WBC NRBC COR # BLD: 6.36 10*3/MM3 (ref 3.5–10.8)

## 2017-09-29 PROCEDURE — 85610 PROTHROMBIN TIME: CPT

## 2017-09-29 PROCEDURE — 99239 HOSP IP/OBS DSCHRG MGMT >30: CPT | Performed by: PHYSICIAN ASSISTANT

## 2017-09-29 PROCEDURE — 99232 SBSQ HOSP IP/OBS MODERATE 35: CPT | Performed by: INTERNAL MEDICINE

## 2017-09-29 PROCEDURE — 93005 ELECTROCARDIOGRAM TRACING: CPT | Performed by: INTERNAL MEDICINE

## 2017-09-29 PROCEDURE — 80069 RENAL FUNCTION PANEL: CPT | Performed by: HOSPITALIST

## 2017-09-29 PROCEDURE — 85027 COMPLETE CBC AUTOMATED: CPT | Performed by: HOSPITALIST

## 2017-09-29 PROCEDURE — 93010 ELECTROCARDIOGRAM REPORT: CPT | Performed by: INTERNAL MEDICINE

## 2017-09-29 RX ORDER — AMIODARONE HYDROCHLORIDE 200 MG/1
200 TABLET ORAL EVERY 12 HOURS SCHEDULED
Qty: 45 TABLET | Refills: 11 | Status: SHIPPED | OUTPATIENT
Start: 2017-09-29 | End: 2017-09-29

## 2017-09-29 RX ORDER — WARFARIN SODIUM 2 MG/1
TABLET ORAL
Qty: 30 TABLET | Refills: 11 | Status: SHIPPED | OUTPATIENT
Start: 2017-09-29 | End: 2018-01-01 | Stop reason: SDUPTHER

## 2017-09-29 RX ORDER — METOPROLOL SUCCINATE 25 MG/1
12.5 TABLET, EXTENDED RELEASE ORAL EVERY MORNING
Qty: 30 TABLET | Refills: 11 | Status: SHIPPED | OUTPATIENT
Start: 2017-09-29 | End: 2018-01-01 | Stop reason: SDUPTHER

## 2017-09-29 RX ORDER — AMIODARONE HYDROCHLORIDE 200 MG/1
200 TABLET ORAL EVERY 12 HOURS SCHEDULED
Qty: 45 TABLET | Refills: 11 | Status: SHIPPED | OUTPATIENT
Start: 2017-09-29 | End: 2017-12-15 | Stop reason: HOSPADM

## 2017-09-29 RX ORDER — BUMETANIDE 1 MG/1
1 TABLET ORAL DAILY
Qty: 30 TABLET | Refills: 11 | Status: SHIPPED | OUTPATIENT
Start: 2017-09-29 | End: 2017-09-29

## 2017-09-29 RX ORDER — BUMETANIDE 1 MG/1
1 TABLET ORAL DAILY
Qty: 30 TABLET | Refills: 11 | Status: SHIPPED | OUTPATIENT
Start: 2017-09-29 | End: 2017-10-18 | Stop reason: SDUPTHER

## 2017-09-29 RX ADMIN — ASPIRIN 81 MG 81 MG: 81 TABLET ORAL at 06:47

## 2017-09-29 RX ADMIN — CETIRIZINE HYDROCHLORIDE 5 MG: 10 TABLET, FILM COATED ORAL at 09:01

## 2017-09-29 RX ADMIN — LEVOTHYROXINE SODIUM 150 MCG: 150 TABLET ORAL at 06:47

## 2017-09-29 RX ADMIN — PANCRELIPASE 24000 UNITS OF LIPASE: 24000; 76000; 120000 CAPSULE, DELAYED RELEASE PELLETS ORAL at 12:18

## 2017-09-29 RX ADMIN — PANTOPRAZOLE SODIUM 40 MG: 40 TABLET, DELAYED RELEASE ORAL at 06:47

## 2017-09-29 RX ADMIN — MULTIPLE VITAMINS W/ MINERALS TAB 1 TABLET: TAB ORAL at 09:00

## 2017-09-29 RX ADMIN — METOPROLOL TARTRATE 12.5 MG: 25 TABLET, FILM COATED ORAL at 09:00

## 2017-09-29 RX ADMIN — VITAMIN D, TAB 1000IU (100/BT) 1000 UNITS: 25 TAB at 09:01

## 2017-09-29 RX ADMIN — BUMETANIDE 1 MG: 1 TABLET ORAL at 09:01

## 2017-09-29 RX ADMIN — AMIODARONE HYDROCHLORIDE 200 MG: 200 TABLET ORAL at 09:01

## 2017-09-29 RX ADMIN — CYANOCOBALAMIN TAB 1000 MCG 500 MCG: 1000 TAB at 09:01

## 2017-09-29 RX ADMIN — PANCRELIPASE 24000 UNITS OF LIPASE: 24000; 76000; 120000 CAPSULE, DELAYED RELEASE PELLETS ORAL at 09:01

## 2017-10-04 ENCOUNTER — ANTICOAGULATION VISIT (OUTPATIENT)
Dept: PHARMACY | Facility: HOSPITAL | Age: 82
End: 2017-10-04

## 2017-10-04 DIAGNOSIS — I48.20 CHRONIC ATRIAL FIBRILLATION (HCC): ICD-10-CM

## 2017-10-04 LAB — INR PPP: 1.8

## 2017-10-04 NOTE — PROGRESS NOTES
Anticoagulation Clinic - Remote Progress Note  HOME MONITOR  Frequency of Monitoring: every Wednesday    Indication: afib  Referring Provider: Cyndi  Initial Warfarin Start Date:   Goal INR: 2-3  Current Drug Interactions: amiodarone, aspirin, levothyroxine  CHADS-VASc: 5 (age, gender, HTN, CHF)    Diet: [GLV INTAKE]  Alcohol:   Tobacco:   OTC Pain Medication:    INR History:  Date  10/4          Total Weekly Dose 16.5 mg 14mg          INR  1.8          Notes pre- amio amio start 9/27 (BID)            Phone Interview:  Tablet Strength: pt has 2mg tablets  Current Maintenance Dose: 2mg daily (previously 3mg daily except 1.5mg TuThSat, before amio)  Patient Findings      Negatives Signs/symptoms of thrombosis, Signs/symptoms of bleeding, Laboratory test error suspected, Change in health, Change in alcohol use, Change in activity, Upcoming invasive procedure, Emergency department visit, Upcoming dental procedure, Missed doses, Extra doses, Change in medications, Change in diet/appetite, Hospital admission, Bruising, Other complaints     Comments New start amio     Patient Contact Info: [phone]  Lab Contact Info: [phone] mdINR    Plan:  1. INR was slightly subtherapeutic yesterday (1.8). Given recent initiation of amiodarone, however, anticipate pt's INR may increase over the next few days. Her dose was lowered ~15% on discharge from the hospital, and she may require further dose adjustment (up to 25% decr. in the first week). For now, instructed pt's daughter to continue warfarin 2mg daily.   2. Repeat INR on Monday. Pt's dtr is aware that her mother will require more frequent INR checks with concomitant amio. Hope to schedule pt to be seen in clinic before/after appt with Dr. Hanna on 10/18.  3. Verbal information provided over the phone to pt's daughter. She RBV dosing instructions, expresses understanding, and has no further questions at this time.    Gisela Thornton [facility]  10/4/2017  11:40  AM

## 2017-10-09 ENCOUNTER — ANTICOAGULATION VISIT (OUTPATIENT)
Dept: PHARMACY | Facility: HOSPITAL | Age: 82
End: 2017-10-09

## 2017-10-09 DIAGNOSIS — I48.20 CHRONIC ATRIAL FIBRILLATION (HCC): ICD-10-CM

## 2017-10-09 LAB — INR PPP: 2.5

## 2017-10-09 NOTE — PROGRESS NOTES
Anticoagulation Clinic - Remote Progress Note  HOME MONITOR  Frequency of Monitoring: every Wednesday    Indication: afib  Referring Provider: Cyndi  Initial Warfarin Start Date:   Goal INR: 2-3  Current Drug Interactions: amiodarone, aspirin, levothyroxine  CHADS-VASc: 5 (age, gender, HTN, CHF)    Diet: [GLV INTAKE]  Alcohol:   Tobacco:   OTC Pain Medication:    INR History:  Date  10/4 10/9         Total Weekly Dose 16.5 mg 14mg 14mg         INR  1.8 2.5         Notes pre- amio amio start 9/27 (BID)            Phone Interview:  Tablet Strength: pt has 2mg tablets  Current Maintenance Dose: 2mg daily (previously 3mg daily except 1.5mg TuThSat, before amio)  Patient Findings      Negatives Signs/symptoms of thrombosis, Signs/symptoms of bleeding, Laboratory test error suspected, Change in health, Change in alcohol use, Change in activity, Upcoming invasive procedure, Emergency department visit, Upcoming dental procedure, Missed doses, Extra doses, Change in medications, Change in diet/appetite, Hospital admission, Bruising, Other complaints     Comments New start amio     Patient Contact Info: [phone]  Lab Contact Info: [phone] mdINR    Plan:  1. INR is therapeutic today (lowered ~15% on discharge from the hospital - may require further dose adjustment (up to 25% decr. in the first week of concomitant amio). For now, instructed pt's daughter to continue warfarin 2mg daily.   2. Repeat INR on Friday. Pt's dtr is aware that her mother will require more frequent INR checks with new-start amio. She will then be seen in clinic before appt with Dr. Hanna on 10/18.  3. Verbal information provided over the phone to pt's daughter. She RBV dosing instructions, expresses understanding, and has no further questions at this time.    Gisela Thornton RPH  10/9/2017  11:16 AM

## 2017-10-18 ENCOUNTER — OFFICE VISIT (OUTPATIENT)
Dept: CARDIOLOGY | Facility: CLINIC | Age: 82
End: 2017-10-18

## 2017-10-18 ENCOUNTER — ANTICOAGULATION VISIT (OUTPATIENT)
Dept: PHARMACY | Facility: HOSPITAL | Age: 82
End: 2017-10-18

## 2017-10-18 VITALS
BODY MASS INDEX: 18.23 KG/M2 | HEIGHT: 66 IN | WEIGHT: 113.4 LBS | SYSTOLIC BLOOD PRESSURE: 140 MMHG | HEART RATE: 98 BPM | DIASTOLIC BLOOD PRESSURE: 70 MMHG

## 2017-10-18 DIAGNOSIS — I49.5 SICK SINUS SYNDROME (HCC): ICD-10-CM

## 2017-10-18 DIAGNOSIS — I38 VALVULAR HEART DISEASE: ICD-10-CM

## 2017-10-18 DIAGNOSIS — I48.20 CHRONIC ATRIAL FIBRILLATION (HCC): Primary | ICD-10-CM

## 2017-10-18 DIAGNOSIS — I10 ESSENTIAL HYPERTENSION: ICD-10-CM

## 2017-10-18 DIAGNOSIS — E78.5 DYSLIPIDEMIA: ICD-10-CM

## 2017-10-18 DIAGNOSIS — I48.20 CHRONIC ATRIAL FIBRILLATION (HCC): ICD-10-CM

## 2017-10-18 LAB
INR PPP: 3.8 (ref 0.91–1.09)
PROTHROMBIN TIME: 45.6 SECONDS (ref 10–13.8)

## 2017-10-18 PROCEDURE — 36416 COLLJ CAPILLARY BLOOD SPEC: CPT

## 2017-10-18 PROCEDURE — G0463 HOSPITAL OUTPT CLINIC VISIT: HCPCS

## 2017-10-18 PROCEDURE — 93000 ELECTROCARDIOGRAM COMPLETE: CPT | Performed by: INTERNAL MEDICINE

## 2017-10-18 PROCEDURE — 99214 OFFICE O/P EST MOD 30 MIN: CPT | Performed by: INTERNAL MEDICINE

## 2017-10-18 PROCEDURE — 85610 PROTHROMBIN TIME: CPT

## 2017-10-18 RX ORDER — BUMETANIDE 1 MG/1
1.5 TABLET ORAL DAILY
Qty: 135 TABLET | Refills: 2 | Status: SHIPPED | OUTPATIENT
Start: 2017-10-18 | End: 2018-01-01 | Stop reason: SDUPTHER

## 2017-10-18 RX ORDER — WARFARIN SODIUM 1 MG/1
TABLET ORAL TAKE AS DIRECTED
COMMUNITY

## 2017-10-18 NOTE — PROGRESS NOTES
Marilee Judge  6/3/1930  [phone]      10/18/2017    Lubna Robertson MD    Chief Complaint: Atrial Fibrillation; Shortness of Breath; and Fatigue    Problem List  1. Atrial fibrillation/sick sinus syndrome:  a. 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  b. Status post dual-chamber Medtronic N Rhythm pacemaker, 09/08/2005 (implanted on the right side).  c. Rythmol therapy since, 09/08/2005.  d. Chronic anticoagulation with Coumadin with a CHADS score = 2.  e. Normal LV function and left atrial dimension by echocardiogram, July 2005.  f. Negative exercise Cardiolite, January 2008; negative adenosine Cardiolite, June 2005; negative stress echocardiogram, April 2004.  g. First-degree AV block.  h. Generator change, September 2011, in [place], [place].  i. ECV 9/27/2017: Post cardioversion the patient displayed a sinus rhythm.  j. Recurrent atrial fibrillation September 27, 2017. Propafenone discontinued.  Amiodarone initiated.  2. Valvular Heart Disease  a. Echocardiogram 4/15/17:  Moderate to severe MR and TR.  No MVP.  Normal LV function.  b. KWADWO, 5/22/2017  c. MitraClip placed 9/12/17 (single)   d. Echo limited 9/13/2017: Trivial pericardial effusion. Normal LV systolic function. Single MitraClip seen in position across MV leaflets.      3. Hypertension.  4. Hyperlipidemia.  5. Dyspnea on exertion  a. Cardiac catheterization, 5/22/2017: Near normal coronary arteries. Successful PTCA and stent placement in the ostial right iliac using a 8 x 17 express LD biliary stent.  6. Hypothyroidism, on chronic replacement therapy.  7. History of breast cancer, status post left mastectomy, 1989.  8. Osteoporosis.  9. Migraine headaches.  10. History of bleeding ulcer 15-20 years ago.  11. Gastroesophageal reflux disease.  12. Restless legs syndrome.  13. History of Helicobacter pylori, 2011.  14. Surgical history:  a. Hysterectomy, 1968.  b. Left mastectomy, 1989.  c. Colon resection for  diverticulosis, 08/17/2013, Dr. Guadarrama at [facility].  d. Cholecystectomy.    Allergies   Allergen Reactions   • Doxycycline Hives     HIVES, RED FACE   • Fosamax [Alendronate] GI Intolerance   • Levofloxacin Hives     HIVES, RED FACE   • Metronidazole GI Intolerance       Current Medications:    Current Outpatient Prescriptions:   •  amiodarone (PACERONE) 200 MG tablet, Take 1 tablet by mouth Every 12 (Twelve) Hours. Every 12 hours for 2 weeks and then once daily. (Patient taking differently: Take 200 mg by mouth Daily. Every 12 hours for 2 weeks and then once daily.), Disp: 45 tablet, Rfl: 11  •  aspirin 81 MG tablet, Take 81 mg by mouth Every Morning., Disp: 30 tablet, Rfl: 11  •  bumetanide (BUMEX) 1 MG tablet, Take 1 tablet by mouth Daily., Disp: 30 tablet, Rfl: 11  •  Calcium Carb-Cholecalciferol (CALCIUM 600 + D PO), Take 1 tablet by mouth 2 (Two) Times a Day., Disp: , Rfl:   •  cholecalciferol (VITAMIN D3) 1000 UNITS tablet, Take 1,000 Units by mouth Daily., Disp: , Rfl:   •  HYDROcodone-acetaminophen (NORCO) 5-325 MG per tablet, Take 1 tablet by mouth Every 6 (Six) Hours As Needed for Moderate Pain (4-6)., Disp: , Rfl:   •  levothyroxine (LEVOXYL) 150 MCG tablet, Take 150 mcg by mouth Every Morning., Disp: , Rfl:   •  Loratadine (CLARITIN) 10 MG capsule, Take 10 mg by mouth Every Morning., Disp: , Rfl:   •  meloxicam (MOBIC) 7.5 MG tablet, Take 7.5 mg by mouth Every Night., Disp: , Rfl:   •  metoprolol succinate XL (TOPROL-XL) 25 MG 24 hr tablet, Take 0.5 tablets by mouth Every Morning., Disp: 30 tablet, Rfl: 11  •  Multiple Vitamins-Minerals (CENTRUM ADULTS PO), Take 1 tablet by mouth Daily., Disp: , Rfl:   •  omeprazole (priLOSEC) 40 MG capsule, Take 40 mg by mouth Every Morning., Disp: , Rfl:   •  pancrelipase, Lip-Prot-Amyl, (CREON) 22439 UNITS capsule delayed-release particles capsule, Take 24,000 units of lipase by mouth 3 (Three) Times a Day With Meals., Disp: , Rfl:   •  pravastatin  "(PRAVACHOL) 40 MG tablet, Take 40 mg by mouth Every Night., Disp: , Rfl:   •  rOPINIRole (REQUIP) 1 MG tablet, Take 1 mg by mouth At Night As Needed. Taking 1mg up to 2-3 times per day (max 4mg per day), Disp: , Rfl:   •  vitamin B-12 (CYANOCOBALAMIN) 500 MCG tablet, Take 500 mcg by mouth Daily., Disp: , Rfl:   •  warfarin (COUMADIN) 1 MG tablet, Take 1 to 2 mg daily as directed, Disp: , Rfl:   •  warfarin (COUMADIN) 2 MG tablet, 1 daily as directed, Disp: 30 tablet, Rfl: 11    HPI  Patient returns today for follow up. Since last visit she continues to experience dyspnea worse during the evening. Relative states that she does not sleep well. Continuously feels fatigued throughout the day, takign longer than usual to get back in routine.  She is short of breath with almost any activity.  When she does take 1.5 mg of Bumex which her daughter has given her on occasion she feels much better.  She has been eating but it's difficult for her to know whether or not she is actually gaining weight her it's just intermittent weight gain.    The following portions of the patient's history were reviewed and updated as appropriate: allergies, current medications and problem list.    Pertinent positives as listed in the HPI.  All other systems reviewed are negative.    Vitals:    10/18/17 1552   BP: 140/70   BP Location: Left arm   Patient Position: Sitting   Pulse: 98   Weight: 113 lb 6.4 oz (51.4 kg)   Height: 66\" (167.6 cm)       General: Alert, awake, and oriented  Neck: Jugular venous pressure is within normal limits. Carotids have normal upstrokes without bruits.   Cardiovascular: Heart has a nondisplaced focal PMI. Irregular rate and rhythm without murmur, gallop or rub.  Lungs: Clear without rales or wheezes. Equal expansion is noted.   Extremities: Show no edema.  Skin: Warm and dry.  Neurologic: nonfocal    Diagnostic Data:    ECG 12 Lead  Date/Time: 10/18/2017 4:17 PM  Performed by: SOPHIA DYER " by: NATASHA DYER   Comparison: compared with previous ECG from 9/29/2017  Similar to previous ECG  Rhythm: atrial fibrillation  Clinical impression: abnormal ECG and low voltage  Comments: Cannot rule out anteroseptal infarct.            Assessment:    ICD-10-CM ICD-9-CM   1. Persistent atrial fibrillation I48.2 427.31   2. Sick sinus syndrome I49.5 427.81   3. Valvular heart disease, s/p MitraClip I38 424.90   4. Essential hypertension, controlled I10 401.9   5. Dyslipidemia, on Pravachol 40 mg. E78.5 272.4       Plan:    1. Increase Bumex to 1.5 mg and will continue to monitor response.  2. Check BMP in 2 weeks.  3. Continue current medications.  4. Set up ECV on November 10, 2017.  5. F/up after procedure.      Scribed for Natasha Dyer MD by Trevon Guadarrama. 10/18/2017  4:07 PM    I Natasha Dyer MD personally performed the services described in this documentation as scribed by the above individual in my presence, and it is both accurate and complete.    Natasha Dyer MD, FACC

## 2017-10-18 NOTE — PROGRESS NOTES
Anticoagulation Clinic - Remote Progress Note  HOME MONITOR  Frequency of Monitoring: every Wednesday    Indication: afib  Referring Provider: Cyndi  Initial Warfarin Start Date: 15 years aog  Goal INR: 2-3  Current Drug Interactions: amiodarone, aspirin, levothyroxine, ropinirole  CHADS-VASc: 5 (age, gender, HTN, CHF)    Diet: frequent, consistent  Alcohol: none  Tobacco: none  OTC Pain Medication: Excedrin (meloxicam)    1st clinic visit: 10/18/17    INR History:  Date  10/4 10/9 10/18        Total Weekly Dose 16.5 mg 14mg 14mg 14mg        INR  1.8 2.5 3.8        Notes pre- amio amio start 9/27 (BID)  clinic          Phone Interview:  Tablet Strength: pt has 2mg tablets  Current Maintenance Dose: 2mg daily   Patient Findings      Negatives Signs/symptoms of thrombosis, Signs/symptoms of bleeding, Laboratory test error suspected, Change in health, Change in alcohol use, Change in activity, Upcoming invasive procedure, Emergency department visit, Upcoming dental procedure, Missed doses, Extra doses, Change in medications, Change in diet/appetite, Hospital admission, Bruising, Other complaints     Comments New start amio     Welcomed Marilee Judge and her daughter to the [facility]. I introduced myself and discussed that we will assist with her warfarin monitoring and work with her cardiologist or other physicians to provide patient care. Encouraged patient to call the clinic with requests for warfarin refills, or with changes in medications/ diet, change in health, or upcoming procedures / surgeries. Discussed signs and symptoms of bleeding and CVA / TIA, and food and drug interactions with warfarin (notably amio, ropinirole, Excedrin, meloxicam). At this time, Marilee Judge did not have further questions or concerns.    Patient Contact Info: [phone]  Lab Contact Info: [phone] mdINR    Plan:  1. INR is supratherapeutic today, possibly a result of new-start amio + recent higher  doses of ropinirole. For now, instructed pt and her daughter to lower her weekly dose to warfarin 2mg daily except 1mg MWF.  2. Repeat INR in 1 week.  3. Verbal and written information provided today in clinic. Pt's dauhter RBV dosing instructions, expresses understanding, and has no further questions at this time.    Gisela Thornton RPH  10/18/2017  2:59 PM

## 2017-10-19 ENCOUNTER — PREP FOR SURGERY (OUTPATIENT)
Dept: OTHER | Facility: HOSPITAL | Age: 82
End: 2017-10-19

## 2017-10-19 DIAGNOSIS — I48.20 CHRONIC ATRIAL FIBRILLATION (HCC): Primary | ICD-10-CM

## 2017-10-19 RX ORDER — SODIUM CHLORIDE 0.9 % (FLUSH) 0.9 %
1-10 SYRINGE (ML) INJECTION AS NEEDED
Status: CANCELLED | OUTPATIENT
Start: 2017-10-19

## 2017-10-25 ENCOUNTER — TELEPHONE (OUTPATIENT)
Dept: CARDIOLOGY | Facility: CLINIC | Age: 82
End: 2017-10-25

## 2017-10-25 ENCOUNTER — ANTICOAGULATION VISIT (OUTPATIENT)
Dept: PHARMACY | Facility: HOSPITAL | Age: 82
End: 2017-10-25

## 2017-10-25 DIAGNOSIS — I48.20 CHRONIC ATRIAL FIBRILLATION (HCC): ICD-10-CM

## 2017-10-25 LAB — INR PPP: 3.4

## 2017-10-25 NOTE — PROGRESS NOTES
Anticoagulation Clinic - Remote Progress Note  HOME MONITOR  Frequency of Monitoring: every Wednesday    Indication: afib  Referring Provider: Cyndi  Initial Warfarin Start Date: 15 years aog  Goal INR: 2-3  Current Drug Interactions: amiodarone, aspirin, levothyroxine, ropinirole  CHADS-VASc: 5 (age, gender, HTN, CHF)    Diet: frequent, consistent  Alcohol: none  Tobacco: none  OTC Pain Medication: Excedrin (meloxicam)    1st clinic visit: 10/18/17    INR History:  Date  10/4 10/9 10/18 10/25       Total Weekly Dose 16.5 mg 14mg 14mg 14mg 11mg 9mg 10mg     INR  1.8 2.5 3.8 3.4       Notes pre- amio amio start 9/27 (BID)  clinic          Phone Interview:  Tablet Strength: pt has 2mg tablets  Current Maintenance Dose: 2mg daily   Patient Findings      Positives Change in medications, Other complaints     Negatives Signs/symptoms of thrombosis, Signs/symptoms of bleeding, Laboratory test error suspected, Change in health, Change in alcohol use, Change in activity, Upcoming invasive procedure, Emergency department visit, Upcoming dental procedure, Missed doses, Extra doses, Change in diet/appetite, Hospital admission, Bruising     Comments Bumex was increased last week, but Mrs. Judge continues feeling poorly. She is SOB, and she is still needing frequent doses of ropinirole throughout the day + complains of ongoing LE edema. She has an appt today for O2 renewal, which her dtr is hoping with help with her overall wellbeing.     Welcomed Marilee Judge and her daughter to the [facility]ticoagulation Clinic. I introduced myself and discussed that we will assist with her warfarin monitoring and work with her cardiologist or other physicians to provide patient care. Encouraged patient to call the clinic with requests for warfarin refills, or with changes in medications/ diet, change in health, or upcoming procedures / surgeries. Discussed signs and symptoms of bleeding and CVA / TIA, and food and drug interactions  with warfarin (notably amio, ropinirole, Excedrin, meloxicam). At this time, Marilee Judge did not have further questions or concerns.    Patient Contact Info: [phone] (Nazia Britton, daughter)  Lab Contact Info: [phone] mdINR    Plan:  1. INR reamins supratherapeutic today despite recent dose decrease. Have therefore instructed pt's dtr to lower her dose to warfarin 1mg daily except 2mg MonFri this week. Consider continuing 1mg daily except 2mg MWF thereafter.  2. Repeat INR in 1 week.  3. Verbal information provided over the phone to pt's dtr, Nazia. She RBV dosing instructions, expresses understanding, and has no further questions at this time.    Gisela Thornton RPH  10/25/2017  8:56 AM

## 2017-10-25 NOTE — TELEPHONE ENCOUNTER
SHELLI Aleman is seeing PT in the office today- they did a walk test to see if PT could qualify for oxygen- her O2 did drop down to 83-86% with ambulation- PT is more SOA and is having BLE edema- she is asking if he could increase her Bumex at all-     D/W [facility] and she may have 2 mg daily for the next few days and family to call me Friday with an update- they will set her up for home oxygen.

## 2017-10-27 ENCOUNTER — TELEPHONE (OUTPATIENT)
Dept: CARDIOLOGY | Facility: CLINIC | Age: 82
End: 2017-10-27

## 2017-10-27 NOTE — TELEPHONE ENCOUNTER
PT/daughter call to update since starting oxygen 2 L NC continuous and the increase in Bumex to 2 mg daily- PT states that she feels some better but she does tire easily- she thinks that the oxygen and increase in Bumex have helped- she will continue her current medications and they will call me Monday with update- I have that they give her current regimen a few more days-

## 2017-11-01 ENCOUNTER — ANTICOAGULATION VISIT (OUTPATIENT)
Dept: PHARMACY | Facility: HOSPITAL | Age: 82
End: 2017-11-01

## 2017-11-01 DIAGNOSIS — I48.20 CHRONIC ATRIAL FIBRILLATION (HCC): ICD-10-CM

## 2017-11-01 LAB — INR PPP: 1.9

## 2017-11-01 RX ORDER — DEXAMETHASONE 0.5 MG/5ML
ELIXIR ORAL AS NEEDED
Status: ON HOLD | COMMUNITY
Start: 2017-09-14 | End: 2017-12-14

## 2017-11-01 RX ORDER — FLUTICASONE PROPIONATE 50 MCG
2 SPRAY, SUSPENSION (ML) NASAL DAILY PRN
COMMUNITY
Start: 2017-10-05 | End: 2018-01-01

## 2017-11-01 NOTE — PROGRESS NOTES
Anticoagulation Clinic - Remote Progress Note  HOME MONITOR  Frequency of Monitoring: every Wednesday    Indication: afib  Referring Provider: Cyndi  Initial Warfarin Start Date: 15 years aog  Goal INR: 2-3  Current Drug Interactions: amiodarone, aspirin, levothyroxine, ropinirole  CHADS-VASc: 5 (age, gender, HTN, CHF)    Diet: frequent, consistent  Alcohol: none  Tobacco: none  OTC Pain Medication: Excedrin (meloxicam)    1st clinic visit: 10/18/17    INR History:  Date  10/4 10/9 10/18 10/25 11/1      Total Weekly Dose 16.5 mg 14mg 14mg 14mg 11mg 9mg 10mg     INR  1.8 2.5 3.8 3.4 1.9      Notes pre- amio amio start 9/27 (BID)  clinic          Phone Interview:  Tablet Strength: pt has 2mg tablets  Current Maintenance Dose: 2mg daily   Patient Findings      Positives Change in medications     Negatives Signs/symptoms of thrombosis, Signs/symptoms of bleeding, Laboratory test error suspected, Change in health, Change in alcohol use, Change in activity, Upcoming invasive procedure, Emergency department visit, Upcoming dental procedure, Missed doses, Extra doses, Change in diet/appetite, Hospital admission, Bruising, Other complaints     Comments Mrs. Judge was started on [non-humidified] O2 last Wedneday + her Bumex dose has been increased to 2mg daily. Her dtr denies additional changes at this time.     Patient Contact Info: [phone] (Nazia Britton, daughter)  Lab Contact Info: [phone] mdINR    Plan:  1. INR is slightly subtherapeutic today following more significant dose decrease last week. Therefore instructed Ms. Britton to increase her mother's dose to warfarin 1mg daily except 2mg MonWedFri.  2. Repeat INR in 1 week.  3. Verbal information provided over the phone to pt's dtr, Nazia Britton. She RBV dosing instructions, expresses understanding, and has no further questions at this time.    Gisela Thornton [facility]  11/1/2017  8:41 AM

## 2017-11-06 DIAGNOSIS — I48.20 CHRONIC ATRIAL FIBRILLATION (HCC): ICD-10-CM

## 2017-11-06 DIAGNOSIS — I10 ESSENTIAL HYPERTENSION: ICD-10-CM

## 2017-11-06 DIAGNOSIS — I38 VALVULAR HEART DISEASE: ICD-10-CM

## 2017-11-06 DIAGNOSIS — E78.5 DYSLIPIDEMIA: ICD-10-CM

## 2017-11-06 DIAGNOSIS — I49.5 SICK SINUS SYNDROME (HCC): ICD-10-CM

## 2017-11-08 ENCOUNTER — ANTICOAGULATION VISIT (OUTPATIENT)
Dept: PHARMACY | Facility: HOSPITAL | Age: 82
End: 2017-11-08

## 2017-11-08 DIAGNOSIS — I48.20 CHRONIC ATRIAL FIBRILLATION (HCC): ICD-10-CM

## 2017-11-08 LAB — INR PPP: 2.2

## 2017-11-08 NOTE — PROGRESS NOTES
Anticoagulation Clinic - Remote Progress Note  HOME MONITOR  Frequency of Monitoring: every Wednesday    Indication: afib  Referring Provider: Cyndi  Initial Warfarin Start Date: 15 years aog  Goal INR: 2-3  Current Drug Interactions: amiodarone, aspirin, levothyroxine, ropinirole  CHADS-VASc: 5 (age, gender, HTN, CHF)    Diet: frequent, consistent  Alcohol: none  Tobacco: none  OTC Pain Medication: Excedrin (meloxicam)    1st clinic visit: 10/18/17    INR History:  Date  10/4 10/9 10/18 10/25 11/1 11/8     Total Weekly Dose 16.5 mg 14mg 14mg 14mg 11mg 9mg 10mg     INR  1.8 2.5 3.8 3.4 1.9 2.2     Notes pre- amio amio start 9/27 (BID)  clinic          Phone Interview:  Tablet Strength: pt has 2mg tablets  Current Maintenance Dose: 2mg daily   Patient Findings      Positives Change in medications, Other complaints     Negatives Signs/symptoms of thrombosis, Signs/symptoms of bleeding, Laboratory test error suspected, Change in health, Change in alcohol use, Change in activity, Upcoming invasive procedure, Emergency department visit, Upcoming dental procedure, Missed doses, Extra doses, Change in diet/appetite, Hospital admission, Bruising     Comments Mrs. Britton (dtr) reports the doctors are trying to wean Mrs. Judge off her ropinirole -- she was instructed to use HC/APAP, instead, and make an appt with pain clinic. Unfortunately, her legs have been giving her trouble since early this morning / last night, and Mrs. Britton has yet to hear anything back from the pain clinic.      Patient Contact Info: [phone] (Nazia Britton, daughter)  Lab Contact Info: [phone] mdINR    Plan:  1. INR is therapeutic today. Instructed Ms. Britton to continue warfarin 1mg daily except 2mg MonWedFri.  2. Repeat INR in 1 week.  3. Verbal information provided over the phone to pt's dtr, Nazia Britton. She RBV dosing instructions, expresses understanding, and has no further questions at this time.    Gisela Thornton,  JW  11/8/2017  3:15 PM

## 2017-11-10 ENCOUNTER — HOSPITAL ENCOUNTER (OUTPATIENT)
Dept: CARDIOLOGY | Facility: HOSPITAL | Age: 82
Discharge: HOME | End: 2017-11-10
Attending: INTERNAL MEDICINE | Admitting: INTERNAL MEDICINE

## 2017-11-10 VITALS
BODY MASS INDEX: 18.53 KG/M2 | SYSTOLIC BLOOD PRESSURE: 115 MMHG | OXYGEN SATURATION: 95 % | RESPIRATION RATE: 16 BRPM | WEIGHT: 115.3 LBS | TEMPERATURE: 97.4 F | DIASTOLIC BLOOD PRESSURE: 90 MMHG | HEART RATE: 91 BPM | HEIGHT: 66 IN

## 2017-11-10 DIAGNOSIS — I49.5 SICK SINUS SYNDROME (HCC): ICD-10-CM

## 2017-11-10 DIAGNOSIS — I48.20 CHRONIC ATRIAL FIBRILLATION (HCC): ICD-10-CM

## 2017-11-10 LAB
BACTERIA UR QL AUTO: ABNORMAL /HPF
BILIRUB UR QL STRIP: NEGATIVE
CLARITY UR: CLEAR
COLOR UR: YELLOW
GLUCOSE UR STRIP-MCNC: NEGATIVE MG/DL
HGB UR QL STRIP.AUTO: NEGATIVE
HYALINE CASTS UR QL AUTO: ABNORMAL /LPF
INR PPP: 2.2
KETONES UR QL STRIP: NEGATIVE
LEUKOCYTE ESTERASE UR QL STRIP.AUTO: ABNORMAL
NITRITE UR QL STRIP: NEGATIVE
PH UR STRIP.AUTO: 6.5 [PH] (ref 5–8)
PROT UR QL STRIP: ABNORMAL
PROTHROMBIN TIME: 24.6 SECONDS (ref 9.6–11.5)
RBC # UR: ABNORMAL /HPF
REF LAB TEST METHOD: ABNORMAL
SP GR UR STRIP: 1.02 (ref 1–1.03)
SQUAMOUS #/AREA URNS HPF: ABNORMAL /HPF
UROBILINOGEN UR QL STRIP: ABNORMAL
WBC UR QL AUTO: ABNORMAL /HPF

## 2017-11-10 PROCEDURE — 93005 ELECTROCARDIOGRAM TRACING: CPT | Performed by: INTERNAL MEDICINE

## 2017-11-10 PROCEDURE — 85610 PROTHROMBIN TIME: CPT | Performed by: INTERNAL MEDICINE

## 2017-11-10 PROCEDURE — 99153 MOD SED SAME PHYS/QHP EA: CPT

## 2017-11-10 PROCEDURE — 92960 CARDIOVERSION ELECTRIC EXT: CPT

## 2017-11-10 PROCEDURE — 92960 CARDIOVERSION ELECTRIC EXT: CPT | Performed by: INTERNAL MEDICINE

## 2017-11-10 PROCEDURE — 93010 ELECTROCARDIOGRAM REPORT: CPT | Performed by: INTERNAL MEDICINE

## 2017-11-10 PROCEDURE — 87086 URINE CULTURE/COLONY COUNT: CPT | Performed by: INTERNAL MEDICINE

## 2017-11-10 PROCEDURE — 25010000002 AMIODARONE IN DEXTROSE 5% 150-4.21 MG/100ML-% SOLUTION: Performed by: INTERNAL MEDICINE

## 2017-11-10 PROCEDURE — 25010000002 MIDAZOLAM PER 1 MG: Performed by: INTERNAL MEDICINE

## 2017-11-10 PROCEDURE — 63710000001 HYDROCODONE-ACETAMINOPHEN 5-325 MG TABLET: Performed by: NURSE PRACTITIONER

## 2017-11-10 PROCEDURE — A9270 NON-COVERED ITEM OR SERVICE: HCPCS | Performed by: NURSE PRACTITIONER

## 2017-11-10 PROCEDURE — 81001 URINALYSIS AUTO W/SCOPE: CPT | Performed by: INTERNAL MEDICINE

## 2017-11-10 PROCEDURE — 99152 MOD SED SAME PHYS/QHP 5/>YRS: CPT

## 2017-11-10 RX ORDER — MIDAZOLAM HYDROCHLORIDE 1 MG/ML
INJECTION INTRAMUSCULAR; INTRAVENOUS
Status: DISCONTINUED | OUTPATIENT
Start: 2017-11-10 | End: 2017-11-10 | Stop reason: HOSPADM

## 2017-11-10 RX ORDER — FLUMAZENIL 0.1 MG/ML
INJECTION INTRAVENOUS
Status: DISCONTINUED
Start: 2017-11-10 | End: 2017-11-10 | Stop reason: WASHOUT

## 2017-11-10 RX ORDER — MIDAZOLAM HYDROCHLORIDE 1 MG/ML
INJECTION INTRAMUSCULAR; INTRAVENOUS
Status: DISCONTINUED
Start: 2017-11-10 | End: 2017-11-10 | Stop reason: HOSPADM

## 2017-11-10 RX ORDER — SODIUM CHLORIDE 0.9 % (FLUSH) 0.9 %
1-10 SYRINGE (ML) INJECTION AS NEEDED
Status: DISCONTINUED | OUTPATIENT
Start: 2017-11-10 | End: 2017-11-10 | Stop reason: HOSPADM

## 2017-11-10 RX ORDER — HYDROCODONE BITARTRATE AND ACETAMINOPHEN 5; 325 MG/1; MG/1
1 TABLET ORAL EVERY 6 HOURS PRN
Status: DISCONTINUED | OUTPATIENT
Start: 2017-11-10 | End: 2017-11-10 | Stop reason: HOSPADM

## 2017-11-10 RX ORDER — FENTANYL CITRATE 50 UG/ML
INJECTION, SOLUTION INTRAMUSCULAR; INTRAVENOUS
Status: DISCONTINUED
Start: 2017-11-10 | End: 2017-11-10 | Stop reason: WASHOUT

## 2017-11-10 RX ORDER — NALOXONE HYDROCHLORIDE 0.4 MG/ML
INJECTION, SOLUTION INTRAMUSCULAR; INTRAVENOUS; SUBCUTANEOUS
Status: DISCONTINUED
Start: 2017-11-10 | End: 2017-11-10 | Stop reason: WASHOUT

## 2017-11-10 RX ADMIN — AMIODARONE HYDROCHLORIDE 150 MG: 1.5 INJECTION, SOLUTION INTRAVENOUS at 09:36

## 2017-11-10 RX ADMIN — METHOHEXITAL SODIUM 20 MG: 500 INJECTION, POWDER, LYOPHILIZED, FOR SOLUTION INTRAMUSCULAR; INTRAVENOUS; RECTAL at 09:08

## 2017-11-10 RX ADMIN — MIDAZOLAM HYDROCHLORIDE 2 MG: 1 INJECTION, SOLUTION INTRAMUSCULAR; INTRAVENOUS at 11:51

## 2017-11-10 RX ADMIN — HYDROCODONE BITARTRATE AND ACETAMINOPHEN 1 TABLET: 5; 325 TABLET ORAL at 10:55

## 2017-11-10 RX ADMIN — METHOHEXITAL SODIUM 20 MG: 500 INJECTION, POWDER, LYOPHILIZED, FOR SOLUTION INTRAMUSCULAR; INTRAVENOUS; RECTAL at 11:51

## 2017-11-10 RX ADMIN — MIDAZOLAM HYDROCHLORIDE 2 MG: 1 INJECTION, SOLUTION INTRAMUSCULAR; INTRAVENOUS at 09:07

## 2017-11-10 NOTE — H&P (VIEW-ONLY)
Marilee Judge  6/3/1930  [phone]      10/18/2017    Lubna Robertson MD    Chief Complaint: Atrial Fibrillation; Shortness of Breath; and Fatigue    Problem List  1. Atrial fibrillation/sick sinus syndrome:  a. 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  b. Status post dual-chamber Medtronic N Rhythm pacemaker, 09/08/2005 (implanted on the right side).  c. Rythmol therapy since, 09/08/2005.  d. Chronic anticoagulation with Coumadin with a CHADS score = 2.  e. Normal LV function and left atrial dimension by echocardiogram, July 2005.  f. Negative exercise Cardiolite, January 2008; negative adenosine Cardiolite, June 2005; negative stress echocardiogram, April 2004.  g. First-degree AV block.  h. Generator change, September 2011, in [place], [place].  i. ECV 9/27/2017: Post cardioversion the patient displayed a sinus rhythm.  j. Recurrent atrial fibrillation September 27, 2017. Propafenone discontinued.  Amiodarone initiated.  2. Valvular Heart Disease  a. Echocardiogram 4/15/17:  Moderate to severe MR and TR.  No MVP.  Normal LV function.  b. KWADWO, 5/22/2017  c. MitraClip placed 9/12/17 (single)   d. Echo limited 9/13/2017: Trivial pericardial effusion. Normal LV systolic function. Single MitraClip seen in position across MV leaflets.      3. Hypertension.  4. Hyperlipidemia.  5. Dyspnea on exertion  a. Cardiac catheterization, 5/22/2017: Near normal coronary arteries. Successful PTCA and stent placement in the ostial right iliac using a 8 x 17 express LD biliary stent.  6. Hypothyroidism, on chronic replacement therapy.  7. History of breast cancer, status post left mastectomy, 1989.  8. Osteoporosis.  9. Migraine headaches.  10. History of bleeding ulcer 15-20 years ago.  11. Gastroesophageal reflux disease.  12. Restless legs syndrome.  13. History of Helicobacter pylori, 2011.  14. Surgical history:  a. Hysterectomy, 1968.  b. Left mastectomy, 1989.  c. Colon resection for  diverticulosis, 08/17/2013, Dr. Guadarrama at [facility].  d. Cholecystectomy.    Allergies   Allergen Reactions   • Doxycycline Hives     HIVES, RED FACE   • Fosamax [Alendronate] GI Intolerance   • Levofloxacin Hives     HIVES, RED FACE   • Metronidazole GI Intolerance       Current Medications:    Current Outpatient Prescriptions:   •  amiodarone (PACERONE) 200 MG tablet, Take 1 tablet by mouth Every 12 (Twelve) Hours. Every 12 hours for 2 weeks and then once daily. (Patient taking differently: Take 200 mg by mouth Daily. Every 12 hours for 2 weeks and then once daily.), Disp: 45 tablet, Rfl: 11  •  aspirin 81 MG tablet, Take 81 mg by mouth Every Morning., Disp: 30 tablet, Rfl: 11  •  bumetanide (BUMEX) 1 MG tablet, Take 1 tablet by mouth Daily., Disp: 30 tablet, Rfl: 11  •  Calcium Carb-Cholecalciferol (CALCIUM 600 + D PO), Take 1 tablet by mouth 2 (Two) Times a Day., Disp: , Rfl:   •  cholecalciferol (VITAMIN D3) 1000 UNITS tablet, Take 1,000 Units by mouth Daily., Disp: , Rfl:   •  HYDROcodone-acetaminophen (NORCO) 5-325 MG per tablet, Take 1 tablet by mouth Every 6 (Six) Hours As Needed for Moderate Pain (4-6)., Disp: , Rfl:   •  levothyroxine (LEVOXYL) 150 MCG tablet, Take 150 mcg by mouth Every Morning., Disp: , Rfl:   •  Loratadine (CLARITIN) 10 MG capsule, Take 10 mg by mouth Every Morning., Disp: , Rfl:   •  meloxicam (MOBIC) 7.5 MG tablet, Take 7.5 mg by mouth Every Night., Disp: , Rfl:   •  metoprolol succinate XL (TOPROL-XL) 25 MG 24 hr tablet, Take 0.5 tablets by mouth Every Morning., Disp: 30 tablet, Rfl: 11  •  Multiple Vitamins-Minerals (CENTRUM ADULTS PO), Take 1 tablet by mouth Daily., Disp: , Rfl:   •  omeprazole (priLOSEC) 40 MG capsule, Take 40 mg by mouth Every Morning., Disp: , Rfl:   •  pancrelipase, Lip-Prot-Amyl, (CREON) 14766 UNITS capsule delayed-release particles capsule, Take 24,000 units of lipase by mouth 3 (Three) Times a Day With Meals., Disp: , Rfl:   •  pravastatin  "(PRAVACHOL) 40 MG tablet, Take 40 mg by mouth Every Night., Disp: , Rfl:   •  rOPINIRole (REQUIP) 1 MG tablet, Take 1 mg by mouth At Night As Needed. Taking 1mg up to 2-3 times per day (max 4mg per day), Disp: , Rfl:   •  vitamin B-12 (CYANOCOBALAMIN) 500 MCG tablet, Take 500 mcg by mouth Daily., Disp: , Rfl:   •  warfarin (COUMADIN) 1 MG tablet, Take 1 to 2 mg daily as directed, Disp: , Rfl:   •  warfarin (COUMADIN) 2 MG tablet, 1 daily as directed, Disp: 30 tablet, Rfl: 11    HPI  Patient returns today for follow up. Since last visit she continues to experience dyspnea worse during the evening. Relative states that she does not sleep well. Continuously feels fatigued throughout the day, takign longer than usual to get back in routine.  She is short of breath with almost any activity.  When she does take 1.5 mg of Bumex which her daughter has given her on occasion she feels much better.  She has been eating but it's difficult for her to know whether or not she is actually gaining weight her it's just intermittent weight gain.    The following portions of the patient's history were reviewed and updated as appropriate: allergies, current medications and problem list.    Pertinent positives as listed in the HPI.  All other systems reviewed are negative.    Vitals:    10/18/17 1552   BP: 140/70   BP Location: Left arm   Patient Position: Sitting   Pulse: 98   Weight: 113 lb 6.4 oz (51.4 kg)   Height: 66\" (167.6 cm)       General: Alert, awake, and oriented  Neck: Jugular venous pressure is within normal limits. Carotids have normal upstrokes without bruits.   Cardiovascular: Heart has a nondisplaced focal PMI. Irregular rate and rhythm without murmur, gallop or rub.  Lungs: Clear without rales or wheezes. Equal expansion is noted.   Extremities: Show no edema.  Skin: Warm and dry.  Neurologic: nonfocal    Diagnostic Data:    ECG 12 Lead  Date/Time: 10/18/2017 4:17 PM  Performed by: SOPHIA DYER " by: NATASHA DYER   Comparison: compared with previous ECG from 9/29/2017  Similar to previous ECG  Rhythm: atrial fibrillation  Clinical impression: abnormal ECG and low voltage  Comments: Cannot rule out anteroseptal infarct.            Assessment:    ICD-10-CM ICD-9-CM   1. Persistent atrial fibrillation I48.2 427.31   2. Sick sinus syndrome I49.5 427.81   3. Valvular heart disease, s/p MitraClip I38 424.90   4. Essential hypertension, controlled I10 401.9   5. Dyslipidemia, on Pravachol 40 mg. E78.5 272.4       Plan:    1. Increase Bumex to 1.5 mg and will continue to monitor response.  2. Check BMP in 2 weeks.  3. Continue current medications.  4. Set up ECV on November 10, 2017.  5. F/up after procedure.      Scribed for Natasha Dyer MD by Trevon Guadarrama. 10/18/2017  4:07 PM    I Natasha Dyer MD personally performed the services described in this documentation as scribed by the above individual in my presence, and it is both accurate and complete.    Natasha Dyer MD, FACC

## 2017-11-10 NOTE — INTERVAL H&P NOTE
H&P updated. The patient was examined and the following changes are noted:    Trace bilateral edema noted.  Patient and daughter both state that her BLE edema was significantly worse today.  Difficult pulses to palpate bilaterally.  May benefit from OMARI's for further assessment.  Her nurse's breath has been limiting her sleeping as well.  She is here today for external cardioversion with Dr. Hanna.  The risks, benefits, potential complications of all been discussed with the patient and she is agreeable to proceed today.  INR therapeutic today at 2.2; has been therapeutic for the past several weeks.    Natasha Hanna MD, FACC

## 2017-11-12 LAB
BACTERIA SPEC AEROBE CULT: NORMAL
BACTERIA SPEC AEROBE CULT: NORMAL

## 2017-11-13 DIAGNOSIS — I48.19 PERSISTENT ATRIAL FIBRILLATION (HCC): Primary | ICD-10-CM

## 2017-11-15 ENCOUNTER — ANTICOAGULATION VISIT (OUTPATIENT)
Dept: PHARMACY | Facility: HOSPITAL | Age: 82
End: 2017-11-15

## 2017-11-15 ENCOUNTER — TELEPHONE (OUTPATIENT)
Dept: PHARMACY | Facility: HOSPITAL | Age: 82
End: 2017-11-15

## 2017-11-15 DIAGNOSIS — I48.20 CHRONIC ATRIAL FIBRILLATION (HCC): ICD-10-CM

## 2017-11-15 LAB — INR PPP: 3.3

## 2017-11-15 NOTE — PROGRESS NOTES
Anticoagulation Clinic - Remote Progress Note  HOME MONITOR  Frequency of Monitoring: every Wednesday    Indication: afib  Referring Provider: Cyndi  Initial Warfarin Start Date: 15 years aog  Goal INR: 2-3  Current Drug Interactions: amiodarone, aspirin, levothyroxine, ropinirole  CHADS-VASc: 5 (age, gender, HTN, CHF)    Diet: frequent, consistent  Alcohol: none  Tobacco: none  OTC Pain Medication: Excedrin (meloxicam)    1st clinic visit: 10/18/17    INR History:  Date  10/4 10/9 10/18 10/25 11/1 11/8 11/15    Total Weekly Dose 16.5 mg 14mg 14mg 14mg 11mg 9mg 10mg 10mg    INR  1.8 2.5 3.8 3.4 1.9 2.2 3.3    Notes pre- amio amio start 9/27 (BID)  clinic    Macrobid      Phone Interview:  Tablet Strength: pt has 2mg tablets  Current Maintenance Dose: 2mg daily   Patient Findings      Positives Change in health, Change in medications     Negatives Signs/symptoms of thrombosis, Signs/symptoms of bleeding, Laboratory test error suspected, Change in alcohol use, Change in activity, Upcoming invasive procedure, Emergency department visit, Upcoming dental procedure, Missed doses, Extra doses, Change in diet/appetite, Hospital admission, Bruising, Other complaints     Comments Mrs. Judge has not been feeling well this week. She had a cardioversion (twice) last week, but her heart did not stay in NSR. She has also been diagnosed with a UTI and is on Macrobid. She is drinking a lot of water to help flush her kidneys, but she is also complaining of a lot of SOA -- using O2 ATC. Suggested that her dtr talk to Dr. Hanna, as it is possible the extra fluid intake could be contributing to her SOA...     Patient Contact Info: [phone] (Nazia Britton, daughter)  Lab Contact Info: [phone] mdINR    Plan:  1. INR is slightly supratherapeutic today (? effects of infection / CHF exacerbation, as Macrobid is not likely to interact with warfarin). Instructed Ms. Britton to lower her mother's dose this week to  warfarin 1mg daily except 2mg MonFri.  2. Repeat INR in 1 week.  3. Verbal information provided over the phone to pt's dtr, Nazia Britton. She RBV dosing instructions, expresses understanding with teach back, and she has no further questions at this time.    Gisela Thornton [facility]  11/15/2017  10:07 AM

## 2017-11-22 ENCOUNTER — ANTICOAGULATION VISIT (OUTPATIENT)
Dept: PHARMACY | Facility: HOSPITAL | Age: 82
End: 2017-11-22

## 2017-11-22 DIAGNOSIS — I48.20 CHRONIC ATRIAL FIBRILLATION (HCC): ICD-10-CM

## 2017-11-22 LAB — INR PPP: 2.7

## 2017-11-22 RX ORDER — FERROUS SULFATE 325(65) MG
325 TABLET ORAL
COMMUNITY
End: 2018-01-01

## 2017-11-22 NOTE — PROGRESS NOTES
Anticoagulation Clinic - Remote Progress Note  HOME MONITOR  Frequency of Monitoring: every Wednesday    Indication: afib  Referring Provider: Cyndi  Initial Warfarin Start Date: 15 years aog  Goal INR: 2-3  Current Drug Interactions: amiodarone, aspirin, levothyroxine, ropinirole  CHADS-VASc: 5 (age, gender, HTN, CHF)    Diet: frequent, consistent  Alcohol: none  Tobacco: none  OTC Pain Medication: Excedrin (meloxicam)    1st clinic visit: 10/18/17    INR History:  Date  10/4 10/9 10/18 10/25 11/1 11/8 11/15 11/22   Total Weekly Dose 16.5 mg 14mg 14mg 14mg 11mg 9mg 10mg 10mg 9mg   INR  1.8 2.5 3.8 3.4 1.9 2.2 3.3 2.7   Notes pre- amio amio start 9/27 (BID)  clinic    Macrobid      Phone Interview:  Tablet Strength: pt has 2mg tablets  Current Maintenance Dose: 1mg daily except 2mg MonFri  Patient Findings      Positives Upcoming invasive procedure, Change in medications     Negatives Signs/symptoms of thrombosis, Signs/symptoms of bleeding, Laboratory test error suspected, Change in health, Change in alcohol use, Change in activity, Emergency department visit, Upcoming dental procedure, Missed doses, Extra doses, Change in diet/appetite, Hospital admission, Bruising, Other complaints     Comments Mrs. Judge has started Ferrous Sulfate 325mg BID, and she finished her rx for Macrobid (only a 5 day supply). 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      Patient Contact Info: [phone] (Nazia Britton, daughter)  Lab Contact Info: [phone] mdINR    Plan:  1. INR is therapeutic today. Instructed Mrs. Britton to continue her mother on warfarin 1mg daily except 2mg MonFri.  2. Repeat INR in 1 week.  3. Verbal information provided over the phone to pt's dtr, Nazia Britton. She expresses understanding with teach back and has no further questions at this time.    Gisela Thornton,  [facility]  11/22/2017  10:32 AM

## 2017-11-27 ENCOUNTER — TELEPHONE (OUTPATIENT)
Dept: CARDIOLOGY | Facility: CLINIC | Age: 82
End: 2017-11-27

## 2017-11-27 DIAGNOSIS — Z79.899 HIGH RISK MEDICATION USE: Primary | ICD-10-CM

## 2017-11-28 PROBLEM — I48.19 PERSISTENT ATRIAL FIBRILLATION (HCC): Status: ACTIVE | Noted: 2017-11-28

## 2017-11-29 ENCOUNTER — ANTICOAGULATION VISIT (OUTPATIENT)
Dept: PHARMACY | Facility: HOSPITAL | Age: 82
End: 2017-11-29

## 2017-11-29 LAB — INR PPP: 2.5

## 2017-11-29 NOTE — PROGRESS NOTES
"Anticoagulation Clinic - Remote Progress Note  HOME MONITOR  Frequency of Monitoring: every Wednesday    Indication: afib  Referring Provider: Cyndi  Initial Warfarin Start Date: 15 years aog  Goal INR: 2-3  Current Drug Interactions: amiodarone, aspirin, levothyroxine, ropinirole  CHADS-VASc: 5 (age, gender, HTN, CHF)    Diet: frequent, consistent  Alcohol: none  Tobacco: none  OTC Pain Medication: Excedrin (meloxicam)    1st clinic visit: 10/18/17    INR History:  Date  10/4 10/9 10/18 10/25 11/1 11/8 11/15 11/22   Total Weekly Dose 16.5 mg 14mg 14mg 14mg 11mg 9mg 10mg 10mg 9mg   INR  1.8 2.5 3.8 3.4 1.9 2.2 3.3 2.7   Notes pre- amio amio start 9/27 (BID)  clinic    Macrobid      Date 11/29           Total Weekly Dose 9mg           INR 2.5           Notes                Phone Interview:  Tablet Strength: pt has 2mg tablets  Current Maintenance Dose: 1mg daily except 2mg MonFri  Patient Findings      Positives Upcoming invasive procedure     Negatives Signs/symptoms of thrombosis, Signs/symptoms of bleeding, Laboratory test error suspected, Change in health, Change in alcohol use, Change in activity, Emergency department visit, Upcoming dental procedure, Missed doses, Extra doses, Change in medications, Change in diet/appetite, Hospital admission, Bruising, Other complaints     Comments Patient is going to have a procedure on 12/14 with Dr. Breen. Per patient notes it appears she is having an AV node ablation. Patient is coming on 12/11 for pre-op. She was told by \"Anetra\" that she would not need to hold her warfarin prior to the procedure.     Patient Contact Info: [phone] (Nazia Britton, daughter)  Lab Contact Info: [phone] mdINR    Plan:  1. INR is therapeutic today. Instructed Mrs. Britton to continue her mother on warfarin 1mg daily except 2mg MonFri.  2. Repeat INR in 1 week. Prefers to check on Wednesdays  3. Verbal information provided over the phone to pt's dtr, Nazia Britton. She " expresses understanding with teach back and has no further questions at this time.    Lita Pascual, PharmD  11/29/2017  8:57 AM

## 2017-12-06 ENCOUNTER — ANTICOAGULATION VISIT (OUTPATIENT)
Dept: PHARMACY | Facility: HOSPITAL | Age: 82
End: 2017-12-06

## 2017-12-06 DIAGNOSIS — I48.91 ATRIAL FIBRILLATION, UNSPECIFIED TYPE (HCC): ICD-10-CM

## 2017-12-06 LAB — INR PPP: 1.8

## 2017-12-06 NOTE — PROGRESS NOTES
Anticoagulation Clinic - Remote Progress Note  HOME MONITOR  Frequency of Monitoring: every Wednesday    Indication: afib  Referring Provider: Cyndi  Initial Warfarin Start Date: 15 years aog  Goal INR: 2-3  Current Drug Interactions: amiodarone, aspirin, levothyroxine, ropinirole  CHADS-VASc: 5 (age, gender, HTN, CHF)    Diet: frequent, consistent  Alcohol: none  Tobacco: none  OTC Pain Medication: Excedrin (meloxicam)    1st clinic visit: 10/18/17    INR History:  Date  10/4 10/9 10/18 10/25 11/1 11/8 11/15 11/22   Total Weekly Dose 16.5 mg 14mg 14mg 14mg 11mg 9mg 10mg 10mg 9mg   INR  1.8 2.5 3.8 3.4 1.9 2.2 3.3 2.7   Notes pre- amio amio start 9/27 (BID)  clinic    Macrobid      Date 11/29 12/6          Total Weekly Dose 9mg 9mg          INR 2.5 1.8          Notes                Phone Interview:  Tablet Strength: pt has 2mg tablets  Current Maintenance Dose: 1mg daily except 2mg MonFri  Patient Findings      Negatives Signs/symptoms of thrombosis, Signs/symptoms of bleeding, Laboratory test error suspected, Change in health, Change in alcohol use, Change in activity, Upcoming invasive procedure, Emergency department visit, Upcoming dental procedure, Missed doses, Extra doses, Change in medications, Change in diet/appetite, Hospital admission, Bruising, Other complaints     Comments Mrs. Judge has an ablation scheduled next week with Dr. Breen.     Patient Contact Info: [phone] (Nazia Britton, daughter)  Lab Contact Info: [phone] mdINR    Plan:  1. INR is subtherapeutic today. Instructed Mrs. Britton to have her mother take warfarin 1mg daily except 2mg MonWedFri this week.  2. Repeat INR with PAT on Monday 12/11, and again in 1 week, if needed.   3. Verbal information provided over the phone to pt's dtr, Nazia rBitton. She expresses understanding with teach back and has no further questions at this time.    Gisela Thornton [facility]  12/6/2017  10:05 AM

## 2017-12-10 ENCOUNTER — PREP FOR SURGERY (OUTPATIENT)
Dept: OTHER | Facility: HOSPITAL | Age: 82
End: 2017-12-10

## 2017-12-10 DIAGNOSIS — I48.19 PERSISTENT ATRIAL FIBRILLATION (HCC): Primary | ICD-10-CM

## 2017-12-10 RX ORDER — NITROGLYCERIN 0.4 MG/1
0.4 TABLET SUBLINGUAL
Status: CANCELLED | OUTPATIENT
Start: 2017-12-10

## 2017-12-10 RX ORDER — PROMETHAZINE HYDROCHLORIDE 25 MG/ML
12.5 INJECTION, SOLUTION INTRAMUSCULAR; INTRAVENOUS EVERY 4 HOURS PRN
Status: CANCELLED | OUTPATIENT
Start: 2017-12-10

## 2017-12-10 RX ORDER — SODIUM CHLORIDE 0.9 % (FLUSH) 0.9 %
1-10 SYRINGE (ML) INJECTION AS NEEDED
Status: CANCELLED | OUTPATIENT
Start: 2017-12-10

## 2017-12-10 RX ORDER — ACETAMINOPHEN 325 MG/1
650 TABLET ORAL EVERY 4 HOURS PRN
Status: CANCELLED | OUTPATIENT
Start: 2017-12-10

## 2017-12-11 ENCOUNTER — ANTICOAGULATION VISIT (OUTPATIENT)
Dept: PHARMACY | Facility: HOSPITAL | Age: 82
End: 2017-12-11

## 2017-12-11 ENCOUNTER — APPOINTMENT (OUTPATIENT)
Dept: PREADMISSION TESTING | Facility: HOSPITAL | Age: 82
End: 2017-12-11

## 2017-12-11 DIAGNOSIS — I48.19 PERSISTENT ATRIAL FIBRILLATION (HCC): ICD-10-CM

## 2017-12-11 DIAGNOSIS — I48.91 ATRIAL FIBRILLATION, UNSPECIFIED TYPE (HCC): ICD-10-CM

## 2017-12-11 LAB
ANION GAP SERPL CALCULATED.3IONS-SCNC: 5 MMOL/L (ref 3–11)
BUN BLD-MCNC: 33 MG/DL (ref 9–23)
BUN/CREAT SERPL: 36.7 (ref 7–25)
CALCIUM SPEC-SCNC: 8.9 MG/DL (ref 8.7–10.4)
CHLORIDE SERPL-SCNC: 102 MMOL/L (ref 99–109)
CO2 SERPL-SCNC: 36 MMOL/L (ref 20–31)
CREAT BLD-MCNC: 0.9 MG/DL (ref 0.6–1.3)
DEPRECATED RDW RBC AUTO: 65.4 FL (ref 37–54)
ERYTHROCYTE [DISTWIDTH] IN BLOOD BY AUTOMATED COUNT: 18.8 % (ref 11.3–14.5)
GFR SERPL CREATININE-BSD FRML MDRD: 59 ML/MIN/1.73
GLUCOSE BLD-MCNC: 72 MG/DL (ref 70–100)
HBA1C MFR BLD: 5.7 % (ref 4.8–5.6)
HCT VFR BLD AUTO: 34.1 % (ref 34.5–44)
HGB BLD-MCNC: 9.8 G/DL (ref 11.5–15.5)
INR PPP: 1.84
MCH RBC QN AUTO: 27.5 PG (ref 27–31)
MCHC RBC AUTO-ENTMCNC: 28.7 G/DL (ref 32–36)
MCV RBC AUTO: 95.5 FL (ref 80–99)
PLATELET # BLD AUTO: 198 10*3/MM3 (ref 150–450)
PMV BLD AUTO: 9.7 FL (ref 6–12)
POTASSIUM BLD-SCNC: 4 MMOL/L (ref 3.5–5.5)
PROTHROMBIN TIME: 20.4 SECONDS (ref 9.6–11.5)
RBC # BLD AUTO: 3.57 10*6/MM3 (ref 3.89–5.14)
SODIUM BLD-SCNC: 143 MMOL/L (ref 132–146)
WBC NRBC COR # BLD: 7.9 10*3/MM3 (ref 3.5–10.8)

## 2017-12-11 PROCEDURE — 36415 COLL VENOUS BLD VENIPUNCTURE: CPT

## 2017-12-11 PROCEDURE — 85610 PROTHROMBIN TIME: CPT | Performed by: PHYSICIAN ASSISTANT

## 2017-12-11 PROCEDURE — 80048 BASIC METABOLIC PNL TOTAL CA: CPT | Performed by: PHYSICIAN ASSISTANT

## 2017-12-11 PROCEDURE — 83036 HEMOGLOBIN GLYCOSYLATED A1C: CPT | Performed by: PHYSICIAN ASSISTANT

## 2017-12-11 PROCEDURE — 85027 COMPLETE CBC AUTOMATED: CPT | Performed by: PHYSICIAN ASSISTANT

## 2017-12-11 NOTE — PROGRESS NOTES
Anticoagulation Clinic - Remote Progress Note  HOME MONITOR  Frequency of Monitoring: every Wednesday    Indication: afib  Referring Provider: Cyndi  Initial Warfarin Start Date: 15 years aog  Goal INR: 2-3  Current Drug Interactions: amiodarone, aspirin, levothyroxine, ropinirole  CHADS-VASc: 5 (age, gender, HTN, CHF)    Diet: frequent, consistent  Alcohol: none  Tobacco: none  OTC Pain Medication: Excedrin (meloxicam)    1st clinic visit: 10/18/17    INR History:  Date  10/4 10/9 10/18 10/25 11/1 11/8 11/15 11/22   Total Weekly Dose 16.5 mg 14mg 14mg 14mg 11mg 9mg 10mg 10mg 9mg   INR  1.8 2.5 3.8 3.4 1.9 2.2 3.3 2.7   Notes pre- amio amio start 9/27 (BID)  clinic    Macrobid      Date 11/29 12/6 12/11         Total Weekly Dose 9mg 9mg 10mg         INR 2.5 1.8 1.84         Notes   PAT           Phone Interview:  Tablet Strength: pt has 2mg tablets  Current Maintenance Dose: 1mg daily except 2mg MonFri  Patient Findings      Negatives Signs/symptoms of thrombosis, Signs/symptoms of bleeding, Laboratory test error suspected, Change in health, Change in alcohol use, Change in activity, Upcoming invasive procedure, Emergency department visit, Upcoming dental procedure, Missed doses, Extra doses, Change in medications, Change in diet/appetite, Hospital admission, Bruising, Other complaints     Comments Mrs. Judge has an ablation scheduled next week with Dr. Breen.     Patient Contact Info: [phone] (Nazia Britton, daughter)  Lab Contact Info: [phone] mdINR    Plan:  1. INR is subtherapeutic again today. Instructed Mrs. Britton to have her mother take warfarin 2mg daily until recheck.  2. Repeat INR on Wednesday (per usual monitoring schedule) to assess need for repeat 2mg dose prior to ablation on Thursday. Will plan to otherwise continue warfarin 1mg daily except 2mg MWF.  3. Verbal information provided over the phone to pt's dtr, Nazia Britton. She expresses understanding with teach back and  has no further questions at this time.    Gisela Thornton [facility]  12/11/2017  3:44 PM

## 2017-12-13 ENCOUNTER — ANTICOAGULATION VISIT (OUTPATIENT)
Dept: PHARMACY | Facility: HOSPITAL | Age: 82
End: 2017-12-13

## 2017-12-13 DIAGNOSIS — I48.91 ATRIAL FIBRILLATION, UNSPECIFIED TYPE (HCC): ICD-10-CM

## 2017-12-13 LAB — INR PPP: 2.5

## 2017-12-13 NOTE — PROGRESS NOTES
Anticoagulation Clinic - Remote Progress Note  HOME MONITOR  Frequency of Monitoring: every Wednesday    Indication: afib  Referring Provider: Cyndi  Initial Warfarin Start Date: 15 years aog  Goal INR: 2-3  Current Drug Interactions: amiodarone, aspirin, levothyroxine, ropinirole  CHADS-VASc: 5 (age, gender, HTN, CHF)    Diet: frequent, consistent  Alcohol: none  Tobacco: none  OTC Pain Medication: Excedrin (meloxicam)    1st clinic visit: 10/18/17    INR History:  Date  10/4 10/9 10/18 10/25 11/1 11/8 11/15 11/22   Total Weekly Dose 16.5 mg 14mg 14mg 14mg 11mg 9mg 10mg 10mg 9mg   INR  1.8 2.5 3.8 3.4 1.9 2.2 3.3 2.7   Notes pre- amio amio start 9/27 (BID)  clinic    Macrobid      Date 11/29 12/6 12/11 12/13        Total Weekly Dose 9mg 9mg 10mg 11mg        INR 2.5 1.8 1.84 2.5        Notes   PAT           Phone Interview:  Tablet Strength: pt has 2mg tablets  Current Maintenance Dose: 1mg daily except 2mg MonWedFri  Patient Findings      Negatives Signs/symptoms of thrombosis, Signs/symptoms of bleeding, Laboratory test error suspected, Change in health, Change in alcohol use, Change in activity, Upcoming invasive procedure, Emergency department visit, Upcoming dental procedure, Missed doses, Extra doses, Change in medications, Change in diet/appetite, Hospital admission, Bruising, Other complaints     Patient Contact Info: [phone] (Nazia Britton, daughter)  Lab Contact Info: [phone] mdINR    Plan:  1. INR is therapeutic today, prior to the AV node ablation tomorrow. Given altered dosing yesterday, instructed Ms. Britton to give her mother warfarin 1mg tonight, then resume warfarin 1mg daily except 2mg MWF.  2. Repeat INR in 1 week.  3. Verbal information provided over the phone to pt's dtr, Nazia Britton. She expresses understanding with teach back and has no further questions at this time.    Eduardo Amezquita, Pharmacy Intern  12/13/2017  10:43 AM     Gisela SHEPPARD [facility], assisted in the  patient interview. I have reviewed the note in full and agree with the assessment and plan.  12/13/17  11:10 AM

## 2017-12-14 ENCOUNTER — HOSPITAL ENCOUNTER (OUTPATIENT)
Facility: HOSPITAL | Age: 82
LOS: 1 days | Discharge: HOME | End: 2017-12-15
Attending: INTERNAL MEDICINE | Admitting: INTERNAL MEDICINE

## 2017-12-14 DIAGNOSIS — I48.19 PERSISTENT ATRIAL FIBRILLATION (HCC): ICD-10-CM

## 2017-12-14 PROCEDURE — 25010000002 FENTANYL CITRATE (PF) 100 MCG/2ML SOLUTION: Performed by: INTERNAL MEDICINE

## 2017-12-14 PROCEDURE — 63710000001 PANCRELIPASE (LIP-PROT-AMYL) 24000-76000 UNITS CAPSULE DELAYED-RELEASE PARTICLES: Performed by: PHYSICIAN ASSISTANT

## 2017-12-14 PROCEDURE — 93650 ICAR CATH ABLTJ AV NODE FUNC: CPT | Performed by: INTERNAL MEDICINE

## 2017-12-14 PROCEDURE — 63710000001 ATORVASTATIN 10 MG TABLET: Performed by: PHYSICIAN ASSISTANT

## 2017-12-14 PROCEDURE — C1733 CATH, EP, OTHR THAN COOL-TIP: HCPCS | Performed by: INTERNAL MEDICINE

## 2017-12-14 PROCEDURE — A9270 NON-COVERED ITEM OR SERVICE: HCPCS | Performed by: PHYSICIAN ASSISTANT

## 2017-12-14 PROCEDURE — 25010000002 MIDAZOLAM PER 1 MG: Performed by: INTERNAL MEDICINE

## 2017-12-14 PROCEDURE — 63710000001 CETIRIZINE 10 MG TABLET: Performed by: PHYSICIAN ASSISTANT

## 2017-12-14 PROCEDURE — 25010000002 ONDANSETRON PER 1 MG: Performed by: INTERNAL MEDICINE

## 2017-12-14 PROCEDURE — 63710000001 MELOXICAM 7.5 MG TABLET: Performed by: PHYSICIAN ASSISTANT

## 2017-12-14 PROCEDURE — C1894 INTRO/SHEATH, NON-LASER: HCPCS | Performed by: INTERNAL MEDICINE

## 2017-12-14 RX ORDER — LEVOTHYROXINE SODIUM 0.15 MG/1
150 TABLET ORAL EVERY MORNING
Status: DISCONTINUED | OUTPATIENT
Start: 2017-12-15 | End: 2017-12-15 | Stop reason: HOSPADM

## 2017-12-14 RX ORDER — ONDANSETRON 2 MG/ML
INJECTION INTRAMUSCULAR; INTRAVENOUS AS NEEDED
Status: DISCONTINUED | OUTPATIENT
Start: 2017-12-14 | End: 2017-12-14 | Stop reason: HOSPADM

## 2017-12-14 RX ORDER — NITROGLYCERIN 0.4 MG/1
0.4 TABLET SUBLINGUAL
Status: DISCONTINUED | OUTPATIENT
Start: 2017-12-14 | End: 2017-12-14 | Stop reason: HOSPADM

## 2017-12-14 RX ORDER — PROMETHAZINE HYDROCHLORIDE 25 MG/ML
12.5 INJECTION, SOLUTION INTRAMUSCULAR; INTRAVENOUS EVERY 4 HOURS PRN
Status: DISCONTINUED | OUTPATIENT
Start: 2017-12-14 | End: 2017-12-14 | Stop reason: HOSPADM

## 2017-12-14 RX ORDER — MELOXICAM 7.5 MG/1
7.5 TABLET ORAL DAILY
Status: DISCONTINUED | OUTPATIENT
Start: 2017-12-14 | End: 2017-12-15 | Stop reason: HOSPADM

## 2017-12-14 RX ORDER — WARFARIN SODIUM 1 MG/1
1 TABLET ORAL
Status: DISCONTINUED | OUTPATIENT
Start: 2017-12-14 | End: 2017-12-15 | Stop reason: HOSPADM

## 2017-12-14 RX ORDER — SODIUM CHLORIDE 9 MG/ML
INJECTION, SOLUTION INTRAVENOUS CONTINUOUS PRN
Status: DISCONTINUED | OUTPATIENT
Start: 2017-12-14 | End: 2017-12-14 | Stop reason: HOSPADM

## 2017-12-14 RX ORDER — ASPIRIN 81 MG/1
81 TABLET, CHEWABLE ORAL DAILY
Status: DISCONTINUED | OUTPATIENT
Start: 2017-12-14 | End: 2017-12-15 | Stop reason: HOSPADM

## 2017-12-14 RX ORDER — MIDAZOLAM HYDROCHLORIDE 1 MG/ML
INJECTION INTRAMUSCULAR; INTRAVENOUS AS NEEDED
Status: DISCONTINUED | OUTPATIENT
Start: 2017-12-14 | End: 2017-12-14 | Stop reason: HOSPADM

## 2017-12-14 RX ORDER — ACETAMINOPHEN 325 MG/1
650 TABLET ORAL EVERY 4 HOURS PRN
Status: DISCONTINUED | OUTPATIENT
Start: 2017-12-14 | End: 2017-12-14 | Stop reason: HOSPADM

## 2017-12-14 RX ORDER — CETIRIZINE HYDROCHLORIDE 10 MG/1
10 TABLET ORAL DAILY
Status: DISCONTINUED | OUTPATIENT
Start: 2017-12-14 | End: 2017-12-15 | Stop reason: HOSPADM

## 2017-12-14 RX ORDER — ATORVASTATIN CALCIUM 10 MG/1
10 TABLET, FILM COATED ORAL DAILY
Status: DISCONTINUED | OUTPATIENT
Start: 2017-12-14 | End: 2017-12-15 | Stop reason: HOSPADM

## 2017-12-14 RX ORDER — METOPROLOL SUCCINATE 25 MG/1
12.5 TABLET, EXTENDED RELEASE ORAL EVERY MORNING
Status: DISCONTINUED | OUTPATIENT
Start: 2017-12-15 | End: 2017-12-14

## 2017-12-14 RX ORDER — SODIUM CHLORIDE 0.9 % (FLUSH) 0.9 %
1-10 SYRINGE (ML) INJECTION AS NEEDED
Status: DISCONTINUED | OUTPATIENT
Start: 2017-12-14 | End: 2017-12-14 | Stop reason: HOSPADM

## 2017-12-14 RX ORDER — WARFARIN SODIUM 2 MG/1
2 TABLET ORAL
Status: DISCONTINUED | OUTPATIENT
Start: 2017-12-15 | End: 2017-12-15 | Stop reason: HOSPADM

## 2017-12-14 RX ORDER — FENTANYL CITRATE 50 UG/ML
INJECTION, SOLUTION INTRAMUSCULAR; INTRAVENOUS AS NEEDED
Status: DISCONTINUED | OUTPATIENT
Start: 2017-12-14 | End: 2017-12-14 | Stop reason: HOSPADM

## 2017-12-14 RX ORDER — HYDROCODONE BITARTRATE AND ACETAMINOPHEN 5; 325 MG/1; MG/1
1 TABLET ORAL EVERY 8 HOURS PRN
Status: DISCONTINUED | OUTPATIENT
Start: 2017-12-14 | End: 2017-12-15 | Stop reason: HOSPADM

## 2017-12-14 RX ORDER — ONDANSETRON 2 MG/ML
4 INJECTION INTRAMUSCULAR; INTRAVENOUS EVERY 6 HOURS PRN
Status: DISCONTINUED | OUTPATIENT
Start: 2017-12-14 | End: 2017-12-15 | Stop reason: HOSPADM

## 2017-12-14 RX ORDER — BUMETANIDE 2 MG/1
2 TABLET ORAL DAILY
Status: DISCONTINUED | OUTPATIENT
Start: 2017-12-14 | End: 2017-12-15 | Stop reason: HOSPADM

## 2017-12-14 RX ORDER — ACETAMINOPHEN 325 MG/1
650 TABLET ORAL EVERY 4 HOURS PRN
Status: DISCONTINUED | OUTPATIENT
Start: 2017-12-14 | End: 2017-12-15 | Stop reason: HOSPADM

## 2017-12-14 RX ADMIN — CETIRIZINE HYDROCHLORIDE 10 MG: 10 TABLET, FILM COATED ORAL at 21:28

## 2017-12-14 RX ADMIN — HYDROCODONE BITARTRATE AND ACETAMINOPHEN 1 TABLET: 5; 325 TABLET ORAL at 12:25

## 2017-12-14 RX ADMIN — ATORVASTATIN CALCIUM 10 MG: 10 TABLET, FILM COATED ORAL at 21:28

## 2017-12-14 RX ADMIN — MELOXICAM 7.5 MG: 7.5 TABLET ORAL at 21:28

## 2017-12-14 RX ADMIN — PANCRELIPASE 24000 UNITS OF LIPASE: 24000; 76000; 120000 CAPSULE, DELAYED RELEASE PELLETS ORAL at 21:28

## 2017-12-14 NOTE — H&P
Cardiology H&P     Marilee Judge  6/3/1930  [phone]      12/14/17    DATE OF ADMISSION: 12/14/2017  [facility]    Lubna Robertson MD  [address] [place] [id number]    CC: atrial fibrillation      Problem List  1. Atrial fibrillation/sick sinus syndrome:  a. 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  b. Status post dual-chamber Medtronic N Rhythm pacemaker, 09/08/2005 (implanted on the right side).  c. Rythmol therapy since, 09/08/2005.  d. Chronic anticoagulation with Coumadin with a CHADS score = 2.  e. Normal LV function and left atrial dimension by echocardiogram, July 2005.  f. Negative exercise Cardiolite, January 2008; negative adenosine Cardiolite, June 2005; negative stress echocardiogram, April 2004.  g. First-degree AV block.  h. Generator change, September 2011, in [place], [place].  i. ECV 9/27/2017: Post cardioversion the patient displayed a sinus rhythm.  j. Recurrent atrial fibrillation September 27, 2017. Propafenone discontinued.  Amiodarone initiated with ECV to NSR  k. Recurrent atrial fibrillation ECV 11/10/17 with return to NSR, but back in afib within 10 minutes   2. Valvular Heart Disease  a. Echocardiogram 4/15/17:  Moderate to severe MR and TR.  No MVP.  Normal LV function.  b. KWADWO, 5/22/2017  c. MitraClip placed 9/12/17 (single)   d. Echo limited 9/13/2017: Trivial pericardial effusion. Normal LV systolic function. Single MitraClip seen in position across MV leaflets.       3. Hypertension.  4. Hyperlipidemia.  5. Dyspnea on exertion  a. Cardiac catheterization, 5/22/2017: Near normal coronary arteries. Successful PTCA and stent placement in the ostial right iliac using a 8 x 17 express LD biliary stent.  6. Hypothyroidism, on chronic replacement therapy.  7. History of breast cancer, status post left mastectomy, 1989.  8. Osteoporosis.  9. Migraine headaches.  10. History of bleeding ulcer 15-20 years  ago.  11. Gastroesophageal reflux disease.  12. Restless legs syndrome.  13. History of Helicobacter pylori, 2011.  14. Surgical history:  a. Hysterectomy, 1968.  b. Left mastectomy, 1989.  c. Colon resection for diverticulosis, 08/17/2013, Dr. Guadarrama at [facility].  d. Cholecystectomy.          History of Present Illness:   87 year old WF with history of atrial fibrillation, SSS s/p MDT PM, and VHD s/p mitral clip who presents today for AVN ablation. She has recently failed Rythmol and Amiodarone along with 2 ECVs with recurrent atrial fibrillation with RVR shortly after. She feels very poorly in atrial fibrillation with diastolic heart failure and worsening of her SOB, now requiring her to wear 24/7 O2. She also has significant swelling of her bilateral lower extremities up to her knees. She was felt to benefit from controlled rates in afib with AVN ablation. Of note, she is still taking her amiodarone.     Allergies   Allergen Reactions   • Doxycycline Hives     HIVES, RED FACE   • Fosamax [Alendronate] GI Intolerance   • Levofloxacin Hives     HIVES, RED FACE   • Metronidazole GI Intolerance       Prior to Admission Medications     Prescriptions Last Dose Informant Patient Reported? Taking?    amiodarone (PACERONE) 200 MG tablet 12/14/2017 Medication Bottle No Yes    Take 1 tablet by mouth Every 12 (Twelve) Hours. Every 12 hours for 2 weeks and then once daily.    Patient taking differently:  Take 200 mg by mouth Daily. Every 12 hours for 2 weeks and then once daily.    aspirin 81 MG tablet 12/14/2017 Medication Bottle Yes Yes    Take 81 mg by mouth Daily.    bumetanide (BUMEX) 1 MG tablet 12/13/2017 Medication Bottle No Yes    Take 1.5 tablets by mouth Daily.    Patient taking differently:  Take 2 mg by mouth Daily.    Calcium Carb-Cholecalciferol (CALCIUM 600 + D PO) 12/13/2017 Medication Bottle Yes Yes    Take 1 tablet by mouth 2 (Two) Times a Day.    cholecalciferol (VITAMIN D3) 1000 UNITS tablet  12/13/2017 Medication Bottle Yes Yes    Take 1,000 Units by mouth Daily.    ferrous sulfate 325 (65 FE) MG tablet 12/13/2017 Medication Bottle Yes Yes    Take 325 mg by mouth Daily With Breakfast.    fluticasone (FLONASE) 50 MCG/ACT nasal spray Unknown  Yes No    2 sprays into each nostril Daily As Needed.    HYDROcodone-acetaminophen (NORCO) 5-325 MG per tablet 12/13/2017 Medication Bottle Yes Yes    Take 1 tablet by mouth Every 8 (Eight) Hours As Needed for Moderate Pain .    levothyroxine (LEVOXYL) 150 MCG tablet 12/14/2017 Medication Bottle Yes Yes    Take 150 mcg by mouth Every Morning.    Loratadine (CLARITIN) 10 MG capsule 12/13/2017 Medication Bottle Yes Yes    Take 10 mg by mouth Every Morning.    meloxicam (MOBIC) 7.5 MG tablet 12/13/2017 Medication Bottle Yes Yes    Take 7.5 mg by mouth Daily.    metoprolol succinate XL (TOPROL-XL) 25 MG 24 hr tablet 12/14/2017 Medication Bottle No Yes    Take 0.5 tablets by mouth Every Morning.    Multiple Vitamins-Minerals (CENTRUM ADULTS PO) 12/13/2017 Medication Bottle Yes Yes    Take 1 tablet by mouth Daily.    omeprazole (priLOSEC) 40 MG capsule 12/14/2017 Medication Bottle Yes Yes    Take 40 mg by mouth Every Morning.    pancrelipase, Lip-Prot-Amyl, (CREON) 59889 UNITS capsule delayed-release particles capsule 12/13/2017 Medication Bottle Yes Yes    Take 24,000 units of lipase by mouth 3 (Three) Times a Day With Meals.    pravastatin (PRAVACHOL) 40 MG tablet 12/13/2017 Medication Bottle Yes Yes    Take 40 mg by mouth Every Night.    rOPINIRole (REQUIP) 1 MG tablet 12/13/2017 Medication Bottle Yes Yes    Take 1 mg by mouth 2 (Two) Times a Day. Taking 1mg up to 2-3 times per day (max 4mg per day)    vitamin B-12 (CYANOCOBALAMIN) 500 MCG tablet 12/13/2017 Medication Bottle Yes Yes    Take 500 mcg by mouth Daily.    warfarin (COUMADIN) 1 MG tablet 12/12/2017 Medication Bottle Yes Yes    Take  by mouth Take As Directed. 2 mg mon wed fri with 1 mg sun tue thurs and sat     warfarin (COUMADIN) 2 MG tablet 12/13/2017 Medication Bottle No Yes    1 daily as directed    Patient taking differently:  1 daily as directed, MWF 2mg  Daily and Mauricio Tu Th Sa, 1mg Daily.            Current Facility-Administered Medications:   •  acetaminophen (TYLENOL) tablet 650 mg, 650 mg, Oral, Q4H PRN, MYA Callejas  •  nitroglycerin (NITROSTAT) SL tablet 0.4 mg, 0.4 mg, Sublingual, Q5 Min PRN, MYA Callejas  •  promethazine (PHENERGAN) injection 12.5 mg, 12.5 mg, Intravenous, Q4H PRN, MYA Callejas  •  sodium chloride 0.9 % flush 1-10 mL, 1-10 mL, Intravenous, PRN, MYA Callejas    Social History     Social History   • Marital status:      Spouse name: N/A   • Number of children: 1   • Years of education: N/A     Occupational History   • [employer] Retired     Social History Main Topics   • Smoking status: Never Smoker   • Smokeless tobacco: Never Used   • Alcohol use No   • Drug use: No   • Sexual activity: Defer     Other Topics Concern   • None     Social History Narrative    Lives in [place], [place] with daughter       Family History   Problem Relation Age of Onset   • No Known Problems Mother    • Coronary artery disease Father    • Stroke Father    • Diabetes Sister    • Coronary artery disease Sister    • Lung cancer Brother        REVIEW OF SYSTEMS:   CONST:  No weight loss, fever, chills, +weakness or fatigue.   HEENT:  No visual loss, blurred vision, double vision, yellow sclerae.                   No hearing loss, congestion, sore throat.   SKIN:      No rashes, urticaria, ulcers, sores.     RESP:     + shortness of breath, - hemoptysis, cough, sputum.   GI:           No anorexia, nausea, vomiting, diarrhea. No abdominal pain, melena.   :         No burning on urination, hematuria or increased frequency.  ENDO:    No diaphoresis, cold or heat intolerance. No polyuria or polydipsia.   NEURO:  No headache, dizziness, syncope, paralysis,  "ataxia, or parasthesias.                  No change in bowel or bladder control. No history of CVA/TIA  MUSC:    No muscle, back pain, joint pain or stiffness.   HEME:    No anemia, bleeding, bruising. No history of DVT/PE.  PSYCH:  No history of depression, anxiety    Vitals:    12/14/17 0918 12/14/17 0919   BP: 123/93 136/78   BP Location: Right arm Left arm   Pulse: 109    Resp: 18    Temp: 97.8 °F (36.6 °C)    TempSrc: Temporal Artery     SpO2: 95%    Weight: 57.2 kg (126 lb 1.7 oz)    Height: 167.6 cm (66\")          Vital Sign Min/Max for last 24 hours  Temp  Min: 97.8 °F (36.6 °C)  Max: 97.8 °F (36.6 °C)   BP  Min: 123/93  Max: 136/78   Pulse  Min: 109  Max: 109   Resp  Min: 18  Max: 18   SpO2  Min: 95 %  Max: 95 %   Flow (L/min)  Min: 2  Max: 2    No intake or output data in the 24 hours ending 12/14/17 1006          Physical Exam:  GEN: thin, elderly female  No acute distress  HEENT: Normocephalic, Atraumatic, PERRLA, moist mucous membranes  NECK: supple, NO JVD, no thyromegaly, no lymphadenopathy  CARD: S1S2  Irr, irr, tachycardic,  no murmur, gallop, rub  LUNGS: Clear to auscultataion, normal respiratory effort  ABDOMEN: Soft, nontender, normal bowel sounds  EXTREMITIES:No gross deformities,  + edema up to knees, toes erythematous.   SKIN: Warm, dry  NEURO: No focal deficits  PSYCHIATRIC: Normal affect and mood      Data:     Results from last 7 days  Lab Units 12/11/17  1113   WBC 10*3/mm3 7.90   HEMOGLOBIN g/dL 9.8*   HEMATOCRIT % 34.1*   PLATELETS 10*3/mm3 198       Results from last 7 days  Lab Units 12/11/17  1113   SODIUM mmol/L 143   POTASSIUM mmol/L 4.0   CHLORIDE mmol/L 102   CO2 mmol/L 36.0*   BUN mg/dL 33*   CREATININE mg/dL 0.90   GLUCOSE mg/dL 72        Results from last 7 days  Lab Units 12/11/17  1113   HEMOGLOBIN A1C % 5.70*               Results from last 7 days  Lab Units 12/13/17 12/11/17  1113   PROTIME Seconds  --  20.4*   INR  2.50 1.84   INR 12/6/17: 1.9  INR 11/22/17 2.7   INR " 11/15/17: 3.3    Telemetry: atrial fibrillation      Assessment and Plan:   1. Persistent atrial fibrillation with RVR - symptomatic with worsened DHF symptoms including SOB and lower extremity edema. She has failed 2 ECVs on Amiodarone in the last two months. She will undergo AVN ablation today. The risks, benefits, and alternatives of the procedure have been reviewed and the patient wishes to proceed.   CHADSvasc = 5 on warfarin with therapeutic INR today, but has not been therapeutic for 3 weeks. . Will stop Amiodarone.  2. VHD - s/p mitral clip with Dr. Hanna  3. LE edema/DHF- hopefully symptoms will improve once HR better controlled after AVN ablation. Continue Bumex.           [facility]  12/14/2017   10:06 AM        I, Avery Breen MD, personally performed the services face to face as described and documented by the above named individual. I have made any necessary edits and it is both accurate and complete 12/14/2017  12:13 PM

## 2017-12-15 VITALS
WEIGHT: 126.1 LBS | HEART RATE: 80 BPM | OXYGEN SATURATION: 92 % | SYSTOLIC BLOOD PRESSURE: 109 MMHG | RESPIRATION RATE: 16 BRPM | BODY MASS INDEX: 20.27 KG/M2 | TEMPERATURE: 98 F | DIASTOLIC BLOOD PRESSURE: 63 MMHG | HEIGHT: 66 IN

## 2017-12-15 PROCEDURE — 63710000001 MELOXICAM 7.5 MG TABLET: Performed by: PHYSICIAN ASSISTANT

## 2017-12-15 PROCEDURE — A9270 NON-COVERED ITEM OR SERVICE: HCPCS | Performed by: PHYSICIAN ASSISTANT

## 2017-12-15 PROCEDURE — 99213 OFFICE O/P EST LOW 20 MIN: CPT | Performed by: INTERNAL MEDICINE

## 2017-12-15 PROCEDURE — 63710000001 ASPIRIN 81 MG CHEWABLE TABLET: Performed by: PHYSICIAN ASSISTANT

## 2017-12-15 PROCEDURE — 63710000001 CETIRIZINE 10 MG TABLET: Performed by: PHYSICIAN ASSISTANT

## 2017-12-15 PROCEDURE — 63710000001 PANCRELIPASE (LIP-PROT-AMYL) 24000-76000 UNITS CAPSULE DELAYED-RELEASE PARTICLES: Performed by: PHYSICIAN ASSISTANT

## 2017-12-15 PROCEDURE — 63710000001 LEVOTHYROXINE 150 MCG TABLET: Performed by: PHYSICIAN ASSISTANT

## 2017-12-15 RX ADMIN — MELOXICAM 7.5 MG: 7.5 TABLET ORAL at 08:03

## 2017-12-15 RX ADMIN — CETIRIZINE HYDROCHLORIDE 10 MG: 10 TABLET, FILM COATED ORAL at 08:04

## 2017-12-15 RX ADMIN — PANCRELIPASE 24000 UNITS OF LIPASE: 24000; 76000; 120000 CAPSULE, DELAYED RELEASE PELLETS ORAL at 08:02

## 2017-12-15 RX ADMIN — ASPIRIN 81 MG 81 MG: 81 TABLET ORAL at 08:02

## 2017-12-15 RX ADMIN — LEVOTHYROXINE SODIUM 150 MCG: 150 TABLET ORAL at 05:46

## 2017-12-15 NOTE — PROGRESS NOTES
[facility] at [facility]  Progress Note       LOS: 1 day   Patient Care Team:  uLbna Robertson MD as PCP - General (Internal Medicine)    Chief Complaint:  Follow up AVN ablation    Subjective     Feels better. Swelling already improved. No CP or SOB. No new complaints          Review of Systems:   Pertinent positives in HPI, all others reviewed and negative.      Objective       Current Facility-Administered Medications:   •  acetaminophen (TYLENOL) tablet 650 mg, 650 mg, Oral, Q4H PRN, Avery Breen MD  •  aspirin chewable tablet 81 mg, 81 mg, Oral, Daily, MYA Callejas, 81 mg at 12/15/17 0802  •  atorvastatin (LIPITOR) tablet 10 mg, 10 mg, Oral, Daily, MYA Callejas, 10 mg at 12/14/17 2128  •  bumetanide (BUMEX) tablet 2 mg, 2 mg, Oral, Daily, MYA Callejas  •  cetirizine (zyrTEC) tablet 10 mg, 10 mg, Oral, Daily, MYA Callejas, 10 mg at 12/15/17 0804  •  HYDROcodone-acetaminophen (NORCO) 5-325 MG per tablet 1 tablet, 1 tablet, Oral, Q8H PRN, MYA Callejas, 1 tablet at 12/14/17 1225  •  levothyroxine (SYNTHROID, LEVOTHROID) tablet 150 mcg, 150 mcg, Oral, QAM, MYA Callejas, 150 mcg at 12/15/17 0546  •  meloxicam (MOBIC) tablet 7.5 mg, 7.5 mg, Oral, Daily, MYA Callejas, 7.5 mg at 12/15/17 0803  •  ondansetron (ZOFRAN) injection 4 mg, 4 mg, Intravenous, Q6H PRN, Avery Breen MD  •  pancrelipase (Lip-Prot-Amyl) (CREON) capsule 24,000 units of lipase, 24,000 units of lipase, Oral, TID With Meals, MYA Callejas, 24,000 units of lipase at 12/15/17 0802  •  warfarin (COUMADIN) tablet 1 mg, 1 mg, Oral, Once per day on Sun Tue Thu Sat, MYA Callejas  •  warfarin (COUMADIN) tablet 2 mg, 2 mg, Oral, Once per day on Mon Wed Fri, MYA Callejas    Vital Sign Min/Max for last 24 hours  Temp  Min: 97.5 °F (36.4 °C)  Max: 97.8 °F (36.6 °C)   BP  Min: 104/64  Max: 162/86   Pulse  Min: 80  Max: 118   Resp  Min: 16  Max: 18   SpO2  Min: 86 %  Max:  "99 %   Flow (L/min)  Min: 2  Max: 4   Weight  Min: 57.2 kg (126 lb 1.7 oz)  Max: 57.2 kg (126 lb 1.7 oz)     Flowsheet Rows         First Filed Value    Admission Height  167.6 cm (66\") Documented at 12/14/2017 0918    Admission Weight  57.2 kg (126 lb 1.7 oz) Documented at 12/14/2017 0918          No intake or output data in the 24 hours ending 12/15/17 0838    Physical Exam:     General Appearance:    Alert, cooperative, in no acute distress   Lungs:     CTA bilaterally    Heart:    RRR Nl S1 S2    Chest Wall:    No abnormalities observed   Abdomen:     Normal bowel sounds, no masses, no organomegaly, soft        non-tender, non-distended, no guarding, no rebound                tenderness   Extremities:   Moves all extremities well, no edema, no cyanosis, no             Redness, No hematoma   Pulses:   Pulses palpable and equal bilaterally   Skin:   No bleeding, bruising or rash        Results Review:     Results from last 7 days  Lab Units 12/11/17  1113   WBC 10*3/mm3 7.90   HEMOGLOBIN g/dL 9.8*   HEMATOCRIT % 34.1*   PLATELETS 10*3/mm3 198       Results from last 7 days  Lab Units 12/11/17  1113   SODIUM mmol/L 143   POTASSIUM mmol/L 4.0   CHLORIDE mmol/L 102   CO2 mmol/L 36.0*   BUN mg/dL 33*   CREATININE mg/dL 0.90   GLUCOSE mg/dL 72        Results from last 7 days  Lab Units 12/11/17  1113   HEMOGLOBIN A1C % 5.70*                   Results from last 7 days  Lab Units 12/13/17 12/11/17  1113   PROTIME Seconds  --  20.4*   INR  2.50 1.84         No intake or output data in the 24 hours ending 12/15/17 0838    I personally viewed and interpreted the patient's EKG/Telemetry data        Telemetry: AF/V paced 80 bpm    Ejection Fraction  No results found for: EF    Echo EF Estimated  Lab Results   Component Value Date    ECHOEFEST 60 05/22/2017         Assessment/Plan   1. Chronic atrial fibrillation with RVR - symptomatic with worsened DHF symptoms including SOB and lower extremity edema. She has failed 2 ECVs on " Amiodarone in the last two months. S/p AVN ablation. Clinically stable  CHADSvasc = 5 on warfarin with therapeutic INR. Stopped Amiodarone  2. VHD - s/p mitral clip with Dr. Hanna  3. LE edema/DHF- hopefully symptoms will improve once HR better controlled after AVN ablation. Continue Bumex     Plan for disposition: The patient is stable and will be discharged to home today with follow up with Dr. Marmolejo in 3 months with MDT device check.     MYA Vogel  12/15/17  8:38 AM      I, Avery Breen MD, personally performed the services face to face as described and documented by the above named individual. I have made any necessary edits and it is both accurate and complete 12/15/2017  8:48 AM

## 2017-12-15 NOTE — PLAN OF CARE
Problem: Patient Care Overview (Adult)  Goal: Plan of Care Review  Outcome: Ongoing (interventions implemented as appropriate)    12/15/17 1016   Coping/Psychosocial Response Interventions   Plan Of Care Reviewed With daughter;patient   Patient Care Overview   Progress improving   Outcome Evaluation   Outcome Summary/Follow up Plan PT TO BE DISCHARGED HOME.         Problem: Arrhythmia/Dysrhythmia (Symptomatic) (Adult)  Goal: Signs and Symptoms of Listed Potential Problems Will be Absent or Manageable (Arrhythmia/Dysrhythmia)  Outcome: Ongoing (interventions implemented as appropriate)    12/15/17 1016   Arrhythmia/Dysrhythmia (Symptomatic)   Problems Assessed (Arrhythmia/Dysrhythmia) all   Problems Present (Arrhythmia/Dysrhythmia) none

## 2017-12-20 ENCOUNTER — ANTICOAGULATION VISIT (OUTPATIENT)
Dept: PHARMACY | Facility: HOSPITAL | Age: 82
End: 2017-12-20

## 2017-12-20 DIAGNOSIS — I48.91 ATRIAL FIBRILLATION, UNSPECIFIED TYPE (HCC): ICD-10-CM

## 2017-12-20 LAB — INR PPP: 2.2

## 2017-12-20 NOTE — PROGRESS NOTES
Anticoagulation Clinic - Remote Progress Note  HOME MONITOR  Frequency of Monitoring: every Wednesday    Indication: afib  Referring Provider: Cyndi  Initial Warfarin Start Date: 15 years aog  Goal INR: 2-3  Current Drug Interactions: aspirin, levothyroxine, ropinirole  CHADS-VASc: 5 (age, gender, HTN, CHF)    Diet: frequent, consistent  Alcohol: none  Tobacco: none  OTC Pain Medication: Excedrin (meloxicam)    1st clinic visit: 10/18/17    INR History:  Date  10/4 10/9 10/18 10/25 11/1 11/8 11/15 11/22   Total Weekly Dose 16.5 mg 14mg 14mg 14mg 11mg 9mg 10mg 10mg 9mg   INR  1.8 2.5 3.8 3.4 1.9 2.2 3.3 2.7   Notes pre- amio amio start 9/27 (BID)  clinic    Macrobid      Date 11/29 12/6 12/11 12/13 12/20       Total Weekly Dose 9mg 9mg 10mg 11mg 10mg       INR 2.5 1.8 1.84 2.5 2.2       Notes   PAT  amio stop 12/14         Phone Interview:  Tablet Strength: pt has 2mg tablets  Current Maintenance Dose: 1mg daily except 2mg MonWedFri  Patient Findings      Positives Change in medications, Extra Doses     Negatives Signs/symptoms of thrombosis, Signs/symptoms of bleeding, Laboratory test error suspected, Change in health, Change in alcohol use, Change in activity, Upcoming invasive procedure, Emergency department visit, Upcoming dental procedure, Missed doses, Extra doses, Change in diet/appetite, Hospital admission, Bruising, Other complaints     Comments Patient's daughter reported that patient had stopped her Amiodarone and her iron supplement early last week. She otherwise admits she was instructed to have her mother take 1mg on Wednesday (12/13), but she actually had her take 2mg instead.     Patient Contact Info: [phone] (Nazia Britton, daughter)  Lab Contact Info: [phone] mdINR    Plan:  1. INR is therapeutic today at 2.2.  Instructed Ms. Britton to continue her mother on warfarin 1mg daily except 2mg MWF. She will likely require dose increases over the next few months with amio d/c.  2.  Repeat INR in 1 week.  3. Verbal information provided over the phone to pt's dtr, Nazia Birtton. She expresses understanding with teach back and has no further questions at this time.    Laura Alfonso, Pharmacy Technician  12/20/2017  10:55 AM     I, Gisela Thornton, [facility], have reviewed the note in full and agree with the assessment and plan.  12/20/17  12:25 PM

## 2017-12-26 ENCOUNTER — TELEPHONE (OUTPATIENT)
Dept: CARDIOLOGY | Facility: CLINIC | Age: 82
End: 2017-12-26

## 2017-12-27 ENCOUNTER — ANTICOAGULATION VISIT (OUTPATIENT)
Dept: PHARMACY | Facility: HOSPITAL | Age: 82
End: 2017-12-27

## 2017-12-27 DIAGNOSIS — I48.91 ATRIAL FIBRILLATION, UNSPECIFIED TYPE (HCC): ICD-10-CM

## 2017-12-27 LAB — INR PPP: 1.7

## 2017-12-27 NOTE — PROGRESS NOTES
Anticoagulation Clinic - Remote Progress Note  HOME MONITOR  Frequency of Monitoring: every Wednesday    Indication: afib  Referring Provider: Cyndi  Initial Warfarin Start Date: 15 years aog  Goal INR: 2-3  Current Drug Interactions: aspirin, levothyroxine, ropinirole  CHADS-VASc: 5 (age, gender, HTN, CHF)    Diet: frequent, consistent  Alcohol: none  Tobacco: none  OTC Pain Medication: Excedrin (meloxicam)    1st clinic visit: 10/18/17    INR History:  Date  10/4 10/9 10/18 10/25 11/1 11/8 11/15 11/22   Total Weekly Dose 16.5 mg 14mg 14mg 14mg 11mg 9mg 10mg 10mg 9mg   INR  1.8 2.5 3.8 3.4 1.9 2.2 3.3 2.7   Notes pre- amio amio start 9/27 (BID)  clinic    Macrobid      Date 11/29 12/6 12/11 12/13 12/20 12/27      Total Weekly Dose 9mg 9mg 10mg 11mg 10mg 9mg 12mg 11mg    INR 2.5 1.8 1.84 2.5 2.2 1.7      Notes   PAT  amio stop 12/14 1 missed dose boost       Phone Interview:  Tablet Strength: pt has 2mg tablets  Current Maintenance Dose: 1mg daily except 2mg MonWedFri  Patient Findings      Positives Missed doses, Change in medications     Negatives Signs/symptoms of thrombosis, Signs/symptoms of bleeding, Laboratory test error suspected, Change in health, Change in alcohol use, Change in activity, Upcoming invasive procedure, Emergency department visit, Upcoming dental procedure, Extra doses, Change in diet/appetite, Hospital admission, Bruising, Other complaints     Comments Mrs. Judge missed her dose of warfarin last Thursday. Bumex dose has been increased to 2mg daily for now, per Tracy / Dr. Hanna. Will follow up re: fluid status on Friday.     Patient Contact Info: [phone] (Nazia Britton, daughter)  Lab Contact Info: [phone] mdINR    Plan:  1. INR is subtherapeutic today, likely a result of missed dose + amio d/c. Have therefore instructed Mrs. Britton to increase her mother's dose (10%) to warfarin 2mg daily except 1mg MWF. She will likely require additional dose increases over  the next few months with amio d/c.  2. Repeat INR in 1 week.  3. Verbal information provided over the phone to pt's dtr, Nazia Britton. She expresses understanding with teach back and has no further questions at this time.    Gisela Thornton RP  12/27/2017  10:38 AM

## 2018-01-01 ENCOUNTER — HOSPITAL ENCOUNTER (INPATIENT)
Facility: HOSPITAL | Age: 83
LOS: 3 days | Discharge: HOSPICE | End: 2018-09-18
Attending: INTERNAL MEDICINE | Admitting: INTERNAL MEDICINE

## 2018-01-01 ENCOUNTER — ANTICOAGULATION VISIT (OUTPATIENT)
Dept: PHARMACY | Facility: HOSPITAL | Age: 83
End: 2018-01-01

## 2018-01-01 ENCOUNTER — APPOINTMENT (OUTPATIENT)
Dept: GENERAL RADIOLOGY | Facility: HOSPITAL | Age: 83
End: 2018-01-01

## 2018-01-01 ENCOUNTER — TELEPHONE (OUTPATIENT)
Dept: PHARMACY | Facility: HOSPITAL | Age: 83
End: 2018-01-01

## 2018-01-01 ENCOUNTER — TELEPHONE (OUTPATIENT)
Dept: CARDIOLOGY | Facility: CLINIC | Age: 83
End: 2018-01-01

## 2018-01-01 ENCOUNTER — PREP FOR SURGERY (OUTPATIENT)
Dept: OTHER | Facility: HOSPITAL | Age: 83
End: 2018-01-01

## 2018-01-01 ENCOUNTER — OFFICE VISIT (OUTPATIENT)
Dept: CARDIOLOGY | Facility: CLINIC | Age: 83
End: 2018-01-01

## 2018-01-01 ENCOUNTER — CLINICAL SUPPORT NO REQUIREMENTS (OUTPATIENT)
Dept: CARDIOLOGY | Facility: CLINIC | Age: 83
End: 2018-01-01

## 2018-01-01 ENCOUNTER — READMISSION MANAGEMENT (OUTPATIENT)
Dept: CALL CENTER | Facility: HOSPITAL | Age: 83
End: 2018-01-01

## 2018-01-01 ENCOUNTER — HOSPITAL ENCOUNTER (OUTPATIENT)
Dept: CARDIOLOGY | Facility: HOSPITAL | Age: 83
Discharge: HOME | End: 2018-09-10
Attending: INTERNAL MEDICINE

## 2018-01-01 ENCOUNTER — HOSPITAL ENCOUNTER (OUTPATIENT)
Dept: GENERAL RADIOLOGY | Facility: HOSPITAL | Age: 83
Discharge: HOME | End: 2018-09-13
Admitting: NURSE PRACTITIONER

## 2018-01-01 ENCOUNTER — HOSPITAL ENCOUNTER (OUTPATIENT)
Dept: CARDIOLOGY | Facility: HOSPITAL | Age: 83
Discharge: HOME | End: 2018-02-21
Attending: INTERNAL MEDICINE | Admitting: INTERNAL MEDICINE

## 2018-01-01 ENCOUNTER — APPOINTMENT (OUTPATIENT)
Dept: CT IMAGING | Facility: HOSPITAL | Age: 83
End: 2018-01-01
Attending: INTERNAL MEDICINE

## 2018-01-01 ENCOUNTER — APPOINTMENT (OUTPATIENT)
Dept: PREADMISSION TESTING | Facility: HOSPITAL | Age: 83
End: 2018-01-01

## 2018-01-01 ENCOUNTER — DOCUMENTATION (OUTPATIENT)
Dept: CARDIOLOGY | Facility: CLINIC | Age: 83
End: 2018-01-01

## 2018-01-01 ENCOUNTER — LAB (OUTPATIENT)
Dept: LAB | Facility: HOSPITAL | Age: 83
End: 2018-01-01

## 2018-01-01 VITALS
SYSTOLIC BLOOD PRESSURE: 150 MMHG | WEIGHT: 110 LBS | HEART RATE: 77 BPM | OXYGEN SATURATION: 94 % | DIASTOLIC BLOOD PRESSURE: 72 MMHG | BODY MASS INDEX: 17.68 KG/M2 | HEIGHT: 66 IN

## 2018-01-01 VITALS
BODY MASS INDEX: 16.94 KG/M2 | OXYGEN SATURATION: 90 % | SYSTOLIC BLOOD PRESSURE: 102 MMHG | WEIGHT: 105.4 LBS | DIASTOLIC BLOOD PRESSURE: 64 MMHG | HEIGHT: 66 IN | HEART RATE: 90 BPM

## 2018-01-01 VITALS
RESPIRATION RATE: 16 BRPM | BODY MASS INDEX: 17.01 KG/M2 | HEART RATE: 71 BPM | SYSTOLIC BLOOD PRESSURE: 118 MMHG | OXYGEN SATURATION: 100 % | WEIGHT: 105.82 LBS | TEMPERATURE: 97.1 F | DIASTOLIC BLOOD PRESSURE: 53 MMHG | HEIGHT: 66 IN

## 2018-01-01 DIAGNOSIS — I48.19 PERSISTENT ATRIAL FIBRILLATION (HCC): ICD-10-CM

## 2018-01-01 DIAGNOSIS — I48.21 PERMANENT ATRIAL FIBRILLATION (HCC): ICD-10-CM

## 2018-01-01 DIAGNOSIS — R06.02 SHORTNESS OF BREATH: ICD-10-CM

## 2018-01-01 DIAGNOSIS — R73.03 PRE-DIABETES: ICD-10-CM

## 2018-01-01 DIAGNOSIS — I48.91 ATRIAL FIBRILLATION, UNSPECIFIED TYPE (HCC): ICD-10-CM

## 2018-01-01 DIAGNOSIS — R93.1 ABNORMAL ECHOCARDIOGRAM: ICD-10-CM

## 2018-01-01 DIAGNOSIS — I73.9 PVD (PERIPHERAL VASCULAR DISEASE) (HCC): ICD-10-CM

## 2018-01-01 DIAGNOSIS — R06.02 SHORTNESS OF BREATH: Primary | ICD-10-CM

## 2018-01-01 DIAGNOSIS — I48.19 PERSISTENT ATRIAL FIBRILLATION (HCC): Primary | ICD-10-CM

## 2018-01-01 DIAGNOSIS — I10 ESSENTIAL HYPERTENSION: ICD-10-CM

## 2018-01-01 DIAGNOSIS — E78.2 MIXED HYPERLIPIDEMIA: ICD-10-CM

## 2018-01-01 DIAGNOSIS — Z79.899 HIGH RISK MEDICATION USE: ICD-10-CM

## 2018-01-01 DIAGNOSIS — Z98.890 HISTORY OF LEFT HEART CATHETERIZATION (LHC): ICD-10-CM

## 2018-01-01 DIAGNOSIS — I73.9 PERIPHERAL VASCULAR DISEASE (HCC): ICD-10-CM

## 2018-01-01 DIAGNOSIS — R13.13 PHARYNGEAL DYSPHAGIA: Primary | ICD-10-CM

## 2018-01-01 DIAGNOSIS — I34.0 NON-RHEUMATIC MITRAL REGURGITATION: ICD-10-CM

## 2018-01-01 DIAGNOSIS — I50.33 ACUTE ON CHRONIC DIASTOLIC HEART FAILURE (HCC): ICD-10-CM

## 2018-01-01 DIAGNOSIS — I38 VHD (VALVULAR HEART DISEASE): Primary | ICD-10-CM

## 2018-01-01 DIAGNOSIS — I48.91 ATRIAL FIBRILLATION, UNSPECIFIED TYPE (HCC): Primary | ICD-10-CM

## 2018-01-01 DIAGNOSIS — R93.1 ABNORMAL ECHOCARDIOGRAM: Primary | ICD-10-CM

## 2018-01-01 DIAGNOSIS — I73.9 PVD (PERIPHERAL VASCULAR DISEASE) (HCC): Primary | ICD-10-CM

## 2018-01-01 LAB
ALBUMIN SERPL-MCNC: 3.16 G/DL (ref 3.2–4.8)
ALBUMIN SERPL-MCNC: 3.51 G/DL (ref 3.2–4.8)
ALBUMIN SERPL-MCNC: 3.8 G/DL (ref 3.2–4.8)
ALBUMIN/GLOB SERPL: 1 G/DL (ref 1.5–2.5)
ALBUMIN/GLOB SERPL: 1.1 G/DL (ref 1.5–2.5)
ALBUMIN/GLOB SERPL: 1.2 G/DL (ref 1.5–2.5)
ALP SERPL-CCNC: 126 U/L (ref 25–100)
ALP SERPL-CCNC: 131 U/L (ref 25–100)
ALP SERPL-CCNC: 82 U/L (ref 25–100)
ALT SERPL W P-5'-P-CCNC: 18 U/L (ref 7–40)
ALT SERPL W P-5'-P-CCNC: 20 U/L (ref 7–40)
ALT SERPL W P-5'-P-CCNC: 25 U/L (ref 7–40)
ANION GAP SERPL CALCULATED.3IONS-SCNC: 10 MMOL/L (ref 3–11)
ANION GAP SERPL CALCULATED.3IONS-SCNC: 13 MMOL/L (ref 3–11)
ANION GAP SERPL CALCULATED.3IONS-SCNC: 4 MMOL/L (ref 3–11)
ANION GAP SERPL CALCULATED.3IONS-SCNC: 5 MMOL/L (ref 3–11)
ANION GAP SERPL CALCULATED.3IONS-SCNC: 8 MMOL/L (ref 3–11)
ANION GAP SERPL CALCULATED.3IONS-SCNC: 8 MMOL/L (ref 3–11)
ARTICHOKE IGE QN: 47 MG/DL (ref 0–130)
AST SERPL-CCNC: 32 U/L (ref 0–33)
AST SERPL-CCNC: 37 U/L (ref 0–33)
AST SERPL-CCNC: 37 U/L (ref 0–33)
ATMOSPHERIC PRESS: ABNORMAL MMHG
BASE EXCESS BLDV CALC-SCNC: 12.2 MMOL/L (ref -2–2)
BASE EXCESS BLDV CALC-SCNC: 12.5 MMOL/L (ref -2–2)
BASE EXCESS BLDV CALC-SCNC: 12.8 MMOL/L (ref -2–2)
BASE EXCESS BLDV CALC-SCNC: 13.3 MMOL/L (ref -2–2)
BASE EXCESS BLDV CALC-SCNC: 13.8 MMOL/L (ref -2–2)
BDY SITE: ABNORMAL
BH CV LOWER ARTERIAL LEFT ABI RATIO: 1.05
BH CV LOWER ARTERIAL LEFT DORSALIS PEDIS SYS MAX: 162 MMHG
BH CV LOWER ARTERIAL LEFT GREAT TOE SYS MAX: 85 MMHG
BH CV LOWER ARTERIAL LEFT POST TIBIAL SYS MAX: 162 MMHG
BH CV LOWER ARTERIAL LEFT TBI RATIO: 0.55
BH CV LOWER ARTERIAL RIGHT ABI RATIO: 1.09
BH CV LOWER ARTERIAL RIGHT DORSALIS PEDIS SYS MAX: 162 MMHG
BH CV LOWER ARTERIAL RIGHT GREAT TOE SYS MAX: 75 MMHG
BH CV LOWER ARTERIAL RIGHT POST TIBIAL SYS MAX: 168 MMHG
BH CV LOWER ARTERIAL RIGHT TBI RATIO: 0.49
BILIRUB SERPL-MCNC: 0.6 MG/DL (ref 0.3–1.2)
BILIRUB SERPL-MCNC: 0.6 MG/DL (ref 0.3–1.2)
BILIRUB SERPL-MCNC: 0.7 MG/DL (ref 0.3–1.2)
BUN BLD-MCNC: 18 MG/DL (ref 9–23)
BUN BLD-MCNC: 20 MG/DL (ref 9–23)
BUN BLD-MCNC: 21 MG/DL (ref 9–23)
BUN BLD-MCNC: 22 MG/DL (ref 9–23)
BUN BLD-MCNC: 23 MG/DL (ref 9–23)
BUN BLD-MCNC: 25 MG/DL (ref 9–23)
BUN/CREAT SERPL: 22.5 (ref 7–25)
BUN/CREAT SERPL: 24.2 (ref 7–25)
BUN/CREAT SERPL: 25.6 (ref 7–25)
BUN/CREAT SERPL: 26.2 (ref 7–25)
BUN/CREAT SERPL: 27.4 (ref 7–25)
BUN/CREAT SERPL: 30.9 (ref 7–25)
CALCIUM SPEC-SCNC: 8.3 MG/DL (ref 8.7–10.4)
CALCIUM SPEC-SCNC: 8.6 MG/DL (ref 8.7–10.4)
CALCIUM SPEC-SCNC: 8.7 MG/DL (ref 8.7–10.4)
CALCIUM SPEC-SCNC: 8.9 MG/DL (ref 8.7–10.4)
CALCIUM SPEC-SCNC: 9 MG/DL (ref 8.7–10.4)
CALCIUM SPEC-SCNC: 9.2 MG/DL (ref 8.7–10.4)
CHLORIDE SERPL-SCNC: 101 MMOL/L (ref 99–109)
CHLORIDE SERPL-SCNC: 102 MMOL/L (ref 99–109)
CHLORIDE SERPL-SCNC: 95 MMOL/L (ref 99–109)
CHLORIDE SERPL-SCNC: 97 MMOL/L (ref 99–109)
CHOLEST SERPL-MCNC: 100 MG/DL (ref 0–200)
CO2 BLDA-SCNC: 40.2 MMOL/L (ref 22–33)
CO2 BLDA-SCNC: 40.4 MMOL/L (ref 22–33)
CO2 BLDA-SCNC: 40.5 MMOL/L (ref 22–33)
CO2 BLDA-SCNC: 40.6 MMOL/L (ref 22–33)
CO2 BLDA-SCNC: 41.4 MMOL/L (ref 22–33)
CO2 SERPL-SCNC: 30 MMOL/L (ref 20–31)
CO2 SERPL-SCNC: 34 MMOL/L (ref 20–31)
CO2 SERPL-SCNC: 36 MMOL/L (ref 20–31)
CO2 SERPL-SCNC: 36 MMOL/L (ref 20–31)
CO2 SERPL-SCNC: 39 MMOL/L (ref 20–31)
CO2 SERPL-SCNC: 40 MMOL/L (ref 20–31)
COHGB MFR BLD: 1.5 % (ref 0–2)
COHGB MFR BLD: 1.6 % (ref 0–2)
COHGB MFR BLD: 1.6 % (ref 0–2)
COHGB MFR BLD: 1.7 % (ref 0–2)
COHGB MFR BLD: 1.8 % (ref 0–2)
CREAT BLD-MCNC: 0.73 MG/DL (ref 0.6–1.3)
CREAT BLD-MCNC: 0.8 MG/DL (ref 0.6–1.3)
CREAT BLD-MCNC: 0.81 MG/DL (ref 0.6–1.3)
CREAT BLD-MCNC: 0.82 MG/DL (ref 0.6–1.3)
CREAT BLD-MCNC: 0.84 MG/DL (ref 0.6–1.3)
CREAT BLD-MCNC: 0.95 MG/DL (ref 0.6–1.3)
DEPRECATED RDW RBC AUTO: 54.1 FL (ref 37–54)
DEPRECATED RDW RBC AUTO: 54.4 FL (ref 37–54)
DEPRECATED RDW RBC AUTO: 55.8 FL (ref 37–54)
ERYTHROCYTE [DISTWIDTH] IN BLOOD BY AUTOMATED COUNT: 17.4 % (ref 11.3–14.5)
ERYTHROCYTE [DISTWIDTH] IN BLOOD BY AUTOMATED COUNT: 17.5 % (ref 11.3–14.5)
ERYTHROCYTE [DISTWIDTH] IN BLOOD BY AUTOMATED COUNT: 17.6 % (ref 11.3–14.5)
GFR SERPL CREATININE-BSD FRML MDRD: 56 ML/MIN/1.73
GFR SERPL CREATININE-BSD FRML MDRD: 64 ML/MIN/1.73
GFR SERPL CREATININE-BSD FRML MDRD: 66 ML/MIN/1.73
GFR SERPL CREATININE-BSD FRML MDRD: 67 ML/MIN/1.73
GFR SERPL CREATININE-BSD FRML MDRD: 68 ML/MIN/1.73
GFR SERPL CREATININE-BSD FRML MDRD: 75 ML/MIN/1.73
GLOBULIN UR ELPH-MCNC: 3 GM/DL
GLOBULIN UR ELPH-MCNC: 3.3 GM/DL
GLOBULIN UR ELPH-MCNC: 3.3 GM/DL
GLUCOSE BLD-MCNC: 100 MG/DL (ref 70–100)
GLUCOSE BLD-MCNC: 113 MG/DL (ref 70–100)
GLUCOSE BLD-MCNC: 116 MG/DL (ref 70–100)
GLUCOSE BLD-MCNC: 90 MG/DL (ref 70–100)
GLUCOSE BLD-MCNC: 90 MG/DL (ref 70–100)
GLUCOSE BLD-MCNC: 98 MG/DL (ref 70–100)
HBA1C MFR BLD: 6.4 % (ref 4.8–5.6)
HCO3 BLDV-SCNC: 38.4 MMOL/L (ref 22–28)
HCO3 BLDV-SCNC: 38.5 MMOL/L (ref 22–28)
HCO3 BLDV-SCNC: 38.8 MMOL/L (ref 22–28)
HCO3 BLDV-SCNC: 38.9 MMOL/L (ref 22–28)
HCO3 BLDV-SCNC: 39.6 MMOL/L (ref 22–28)
HCT VFR BLD AUTO: 28.8 % (ref 34.5–44)
HCT VFR BLD AUTO: 29.7 % (ref 34.5–44)
HCT VFR BLD AUTO: 30.1 % (ref 34.5–44)
HCT VFR BLD AUTO: 30.9 % (ref 34.5–44)
HDLC SERPL-MCNC: 42 MG/DL (ref 40–60)
HGB BLD-MCNC: 8.4 G/DL (ref 11.5–15.5)
HGB BLD-MCNC: 8.7 G/DL (ref 11.5–15.5)
HGB BLD-MCNC: 8.8 G/DL (ref 11.5–15.5)
HGB BLD-MCNC: 8.8 G/DL (ref 11.5–15.5)
HGB BLDA-MCNC: 8.5 G/DL (ref 14–18)
HGB BLDA-MCNC: 8.5 G/DL (ref 14–18)
HGB BLDA-MCNC: 8.7 G/DL (ref 14–18)
HGB BLDA-MCNC: 8.7 G/DL (ref 14–18)
HGB BLDA-MCNC: 8.9 G/DL (ref 14–18)
HOROWITZ INDEX BLD+IHG-RTO: 28 %
INR PPP: 1.15 (ref 0.91–1.09)
INR PPP: 1.2
INR PPP: 1.2 (ref 0.8–1.2)
INR PPP: 1.3
INR PPP: 1.4
INR PPP: 1.5
INR PPP: 1.8
INR PPP: 1.9
INR PPP: 1.9
INR PPP: 2
INR PPP: 2.3
INR PPP: 2.4
INR PPP: 2.5
INR PPP: 2.6
INR PPP: 2.6
INR PPP: 2.7
INR PPP: 2.8
INR PPP: 2.9
INR PPP: 2.9
INR PPP: 3.1
INR PPP: 3.1
INR PPP: 3.1 (ref 0.91–1.09)
INR PPP: 3.3
INR PPP: 3.4
INR PPP: 3.5
INR PPP: 3.7
INR PPP: 3.7
INR PPP: 3.8
INR PPP: 3.8
IRON 24H UR-MRATE: 15 MCG/DL (ref 50–175)
IRON SATN MFR SERPL: 4 % (ref 15–50)
MCH RBC QN AUTO: 24.7 PG (ref 27–31)
MCH RBC QN AUTO: 24.9 PG (ref 27–31)
MCH RBC QN AUTO: 24.9 PG (ref 27–31)
MCHC RBC AUTO-ENTMCNC: 28.5 G/DL (ref 32–36)
MCHC RBC AUTO-ENTMCNC: 29.2 G/DL (ref 32–36)
MCHC RBC AUTO-ENTMCNC: 29.3 G/DL (ref 32–36)
MCV RBC AUTO: 84.4 FL (ref 80–99)
MCV RBC AUTO: 85.2 FL (ref 80–99)
MCV RBC AUTO: 87.3 FL (ref 80–99)
METHGB BLD QL: 0.4 % (ref 0–1.5)
METHGB BLD QL: 0.5 % (ref 0–1.5)
METHGB BLD QL: 0.7 % (ref 0–1.5)
METHGB BLD QL: 0.8 % (ref 0–1.5)
METHGB BLD QL: 0.8 % (ref 0–1.5)
MODALITY: ABNORMAL
OXYHGB MFR BLDV: 33.7 % (ref 94–99)
OXYHGB MFR BLDV: 35.9 % (ref 94–99)
OXYHGB MFR BLDV: 37.9 % (ref 94–99)
OXYHGB MFR BLDV: 41.9 % (ref 94–99)
OXYHGB MFR BLDV: 51.6 % (ref 94–99)
PCO2 BLDV: 55.9 MM HG (ref 41–51)
PCO2 BLDV: 57.8 MM HG (ref 41–51)
PCO2 BLDV: 58.5 MM HG (ref 41–51)
PCO2 BLDV: 58.5 MM HG (ref 41–51)
PCO2 BLDV: 61.6 MM HG (ref 41–51)
PH BLDV: 7.4 PH UNITS (ref 7.25–7.5)
PH BLDV: 7.43 PH UNITS (ref 7.25–7.5)
PH BLDV: 7.43 PH UNITS (ref 7.25–7.5)
PH BLDV: 7.44 PH UNITS (ref 7.25–7.5)
PH BLDV: 7.45 PH UNITS (ref 7.25–7.5)
PLATELET # BLD AUTO: 174 10*3/MM3 (ref 150–450)
PLATELET # BLD AUTO: 181 10*3/MM3 (ref 150–450)
PLATELET # BLD AUTO: 211 10*3/MM3 (ref 150–450)
PMV BLD AUTO: 10 FL (ref 6–12)
PMV BLD AUTO: 10.1 FL (ref 6–12)
PMV BLD AUTO: 9.8 FL (ref 6–12)
PO2 BLDV: 23.1 MM HG (ref 27–53)
PO2 BLDV: 23.5 MM HG (ref 27–53)
PO2 BLDV: 25.3 MM HG (ref 27–53)
PO2 BLDV: 27.6 MM HG (ref 27–53)
PO2 BLDV: 31.4 MM HG (ref 27–53)
POTASSIUM BLD-SCNC: 3.6 MMOL/L (ref 3.5–5.5)
POTASSIUM BLD-SCNC: 3.8 MMOL/L (ref 3.5–5.5)
POTASSIUM BLD-SCNC: 3.9 MMOL/L (ref 3.5–5.5)
POTASSIUM BLD-SCNC: 3.9 MMOL/L (ref 3.5–5.5)
POTASSIUM BLD-SCNC: 4.1 MMOL/L (ref 3.5–5.5)
POTASSIUM BLD-SCNC: 4.1 MMOL/L (ref 3.5–5.5)
PROT SERPL-MCNC: 6.2 G/DL (ref 5.7–8.2)
PROT SERPL-MCNC: 6.8 G/DL (ref 5.7–8.2)
PROT SERPL-MCNC: 7.1 G/DL (ref 5.7–8.2)
PROTHROMBIN TIME: 12.1 SECONDS (ref 9.6–11.5)
PROTHROMBIN TIME: 14 SECONDS (ref 12.8–15.2)
PROTHROMBIN TIME: 36.9 SECONDS (ref 10–13.8)
RBC # BLD AUTO: 3.38 10*6/MM3 (ref 3.89–5.14)
RBC # BLD AUTO: 3.52 10*6/MM3 (ref 3.89–5.14)
RBC # BLD AUTO: 3.54 10*6/MM3 (ref 3.89–5.14)
SODIUM BLD-SCNC: 139 MMOL/L (ref 132–146)
SODIUM BLD-SCNC: 139 MMOL/L (ref 132–146)
SODIUM BLD-SCNC: 141 MMOL/L (ref 132–146)
SODIUM BLD-SCNC: 142 MMOL/L (ref 132–146)
SODIUM BLD-SCNC: 143 MMOL/L (ref 132–146)
SODIUM BLD-SCNC: 144 MMOL/L (ref 132–146)
TIBC SERPL-MCNC: 369 MCG/DL (ref 250–450)
TRIGL SERPL-MCNC: 67 MG/DL (ref 0–150)
TSH SERPL DL<=0.05 MIU/L-ACNC: 0.12 MIU/ML (ref 0.35–5.35)
UPPER ARTERIAL LEFT ARM BRACHIAL SYS MAX: 140 MMHG
UPPER ARTERIAL RIGHT ARM BRACHIAL SYS MAX: 154 MMHG
VIT B12 BLD-MCNC: 1149 PG/ML (ref 211–911)
WBC NRBC COR # BLD: 4.93 10*3/MM3 (ref 3.5–10.8)
WBC NRBC COR # BLD: 5.42 10*3/MM3 (ref 3.5–10.8)
WBC NRBC COR # BLD: 5.49 10*3/MM3 (ref 3.5–10.8)

## 2018-01-01 PROCEDURE — 71045 X-RAY EXAM CHEST 1 VIEW: CPT

## 2018-01-01 PROCEDURE — 80048 BASIC METABOLIC PNL TOTAL CA: CPT | Performed by: INTERNAL MEDICINE

## 2018-01-01 PROCEDURE — 4A023N6 MEASUREMENT OF CARDIAC SAMPLING AND PRESSURE, RIGHT HEART, PERCUTANEOUS APPROACH: ICD-10-PCS | Performed by: INTERNAL MEDICINE

## 2018-01-01 PROCEDURE — C1894 INTRO/SHEATH, NON-LASER: HCPCS | Performed by: INTERNAL MEDICINE

## 2018-01-01 PROCEDURE — 99232 SBSQ HOSP IP/OBS MODERATE 35: CPT | Performed by: INTERNAL MEDICINE

## 2018-01-01 PROCEDURE — 80053 COMPREHEN METABOLIC PANEL: CPT | Performed by: INTERNAL MEDICINE

## 2018-01-01 PROCEDURE — 74230 X-RAY XM SWLNG FUNCJ C+: CPT

## 2018-01-01 PROCEDURE — 63710000001 BUMETANIDE 2 MG TABLET: Performed by: INTERNAL MEDICINE

## 2018-01-01 PROCEDURE — 63710000001 SENNOSIDES-DOCUSATE SODIUM 8.6-50 MG TABLET: Performed by: INTERNAL MEDICINE

## 2018-01-01 PROCEDURE — A9270 NON-COVERED ITEM OR SERVICE: HCPCS | Performed by: INTERNAL MEDICINE

## 2018-01-01 PROCEDURE — B2141ZZ FLUOROSCOPY OF RIGHT HEART USING LOW OSMOLAR CONTRAST: ICD-10-PCS | Performed by: INTERNAL MEDICINE

## 2018-01-01 PROCEDURE — 93923 UPR/LXTR ART STDY 3+ LVLS: CPT | Performed by: INTERNAL MEDICINE

## 2018-01-01 PROCEDURE — 36415 COLL VENOUS BLD VENIPUNCTURE: CPT

## 2018-01-01 PROCEDURE — 36416 COLLJ CAPILLARY BLOOD SPEC: CPT

## 2018-01-01 PROCEDURE — 85610 PROTHROMBIN TIME: CPT | Performed by: INTERNAL MEDICINE

## 2018-01-01 PROCEDURE — 63710000001 METOPROLOL SUCCINATE XL 25 MG TABLET SUSTAINED-RELEASE 24 HOUR: Performed by: INTERNAL MEDICINE

## 2018-01-01 PROCEDURE — 85018 HEMOGLOBIN: CPT | Performed by: INTERNAL MEDICINE

## 2018-01-01 PROCEDURE — 80053 COMPREHEN METABOLIC PANEL: CPT

## 2018-01-01 PROCEDURE — 83540 ASSAY OF IRON: CPT | Performed by: INTERNAL MEDICINE

## 2018-01-01 PROCEDURE — 63710000001 PANCRELIPASE (LIP-PROT-AMYL) 24000-76000 UNITS CAPSULE DELAYED-RELEASE PARTICLES: Performed by: INTERNAL MEDICINE

## 2018-01-01 PROCEDURE — 99233 SBSQ HOSP IP/OBS HIGH 50: CPT | Performed by: INTERNAL MEDICINE

## 2018-01-01 PROCEDURE — 93294 REM INTERROG EVL PM/LDLS PM: CPT | Performed by: INTERNAL MEDICINE

## 2018-01-01 PROCEDURE — 85610 PROTHROMBIN TIME: CPT

## 2018-01-01 PROCEDURE — 99222 1ST HOSP IP/OBS MODERATE 55: CPT | Performed by: INTERNAL MEDICINE

## 2018-01-01 PROCEDURE — 63710000001 ROPINIROLE 0.5 MG TABLET: Performed by: INTERNAL MEDICINE

## 2018-01-01 PROCEDURE — 83550 IRON BINDING TEST: CPT | Performed by: INTERNAL MEDICINE

## 2018-01-01 PROCEDURE — 93010 ELECTROCARDIOGRAM REPORT: CPT | Performed by: INTERNAL MEDICINE

## 2018-01-01 PROCEDURE — 85014 HEMATOCRIT: CPT | Performed by: INTERNAL MEDICINE

## 2018-01-01 PROCEDURE — 82607 VITAMIN B-12: CPT | Performed by: INTERNAL MEDICINE

## 2018-01-01 PROCEDURE — 25010000002 CEFTRIAXONE PER 250 MG: Performed by: INTERNAL MEDICINE

## 2018-01-01 PROCEDURE — 85027 COMPLETE CBC AUTOMATED: CPT | Performed by: NURSE PRACTITIONER

## 2018-01-01 PROCEDURE — 0W9930Z DRAINAGE OF RIGHT PLEURAL CAVITY WITH DRAINAGE DEVICE, PERCUTANEOUS APPROACH: ICD-10-PCS | Performed by: RADIOLOGY

## 2018-01-01 PROCEDURE — 99238 HOSP IP/OBS DSCHRG MGMT 30/<: CPT | Performed by: INTERNAL MEDICINE

## 2018-01-01 PROCEDURE — G8997 SWALLOW GOAL STATUS: HCPCS

## 2018-01-01 PROCEDURE — 71046 X-RAY EXAM CHEST 2 VIEWS: CPT

## 2018-01-01 PROCEDURE — G0463 HOSPITAL OUTPT CLINIC VISIT: HCPCS

## 2018-01-01 PROCEDURE — 63710000001 FLUTICASONE 50 MCG/ACT SUSPENSION 16 G BOTTLE: Performed by: INTERNAL MEDICINE

## 2018-01-01 PROCEDURE — 93451 RIGHT HEART CATH: CPT | Performed by: INTERNAL MEDICINE

## 2018-01-01 PROCEDURE — 99214 OFFICE O/P EST MOD 30 MIN: CPT | Performed by: INTERNAL MEDICINE

## 2018-01-01 PROCEDURE — 84443 ASSAY THYROID STIM HORMONE: CPT | Performed by: INTERNAL MEDICINE

## 2018-01-01 PROCEDURE — 63710000001 ATORVASTATIN 10 MG TABLET: Performed by: INTERNAL MEDICINE

## 2018-01-01 PROCEDURE — 63710000001 MELOXICAM 7.5 MG TABLET: Performed by: INTERNAL MEDICINE

## 2018-01-01 PROCEDURE — 85027 COMPLETE CBC AUTOMATED: CPT | Performed by: INTERNAL MEDICINE

## 2018-01-01 PROCEDURE — 92611 MOTION FLUOROSCOPY/SWALLOW: CPT

## 2018-01-01 PROCEDURE — 63710000001 PANTOPRAZOLE 40 MG TABLET DELAYED-RELEASE: Performed by: INTERNAL MEDICINE

## 2018-01-01 PROCEDURE — 63710000001 HYDROCODONE-ACETAMINOPHEN 7.5-325 MG TABLET: Performed by: INTERNAL MEDICINE

## 2018-01-01 PROCEDURE — 25010000002 MORPHINE SULFATE (PF) 2 MG/ML SOLUTION: Performed by: INTERNAL MEDICINE

## 2018-01-01 PROCEDURE — 92610 EVALUATE SWALLOWING FUNCTION: CPT

## 2018-01-01 PROCEDURE — 99213 OFFICE O/P EST LOW 20 MIN: CPT | Performed by: INTERNAL MEDICINE

## 2018-01-01 PROCEDURE — 80053 COMPREHEN METABOLIC PANEL: CPT | Performed by: NURSE PRACTITIONER

## 2018-01-01 PROCEDURE — 93923 UPR/LXTR ART STDY 3+ LVLS: CPT

## 2018-01-01 PROCEDURE — 83036 HEMOGLOBIN GLYCOSYLATED A1C: CPT | Performed by: NURSE PRACTITIONER

## 2018-01-01 PROCEDURE — 80061 LIPID PANEL: CPT | Performed by: INTERNAL MEDICINE

## 2018-01-01 PROCEDURE — 93005 ELECTROCARDIOGRAM TRACING: CPT | Performed by: INTERNAL MEDICINE

## 2018-01-01 PROCEDURE — G8996 SWALLOW CURRENT STATUS: HCPCS

## 2018-01-01 PROCEDURE — 93279 PRGRMG DEV EVAL PM/LDLS PM: CPT | Performed by: INTERNAL MEDICINE

## 2018-01-01 PROCEDURE — 63710000001 POTASSIUM CHLORIDE 10 MEQ CAPSULE CONTROLLED-RELEASE: Performed by: INTERNAL MEDICINE

## 2018-01-01 PROCEDURE — 63710000001 NORTRIPTYLINE 25 MG CAPSULE: Performed by: INTERNAL MEDICINE

## 2018-01-01 PROCEDURE — 63710000001 CHOLECALCIFEROL 1000 UNITS TABLET: Performed by: INTERNAL MEDICINE

## 2018-01-01 PROCEDURE — C1751 CATH, INF, PER/CENT/MIDLINE: HCPCS | Performed by: INTERNAL MEDICINE

## 2018-01-01 PROCEDURE — 82805 BLOOD GASES W/O2 SATURATION: CPT | Performed by: INTERNAL MEDICINE

## 2018-01-01 PROCEDURE — 93296 REM INTERROG EVL PM/IDS: CPT | Performed by: INTERNAL MEDICINE

## 2018-01-01 PROCEDURE — 63710000001 LEVOTHYROXINE 150 MCG TABLET: Performed by: INTERNAL MEDICINE

## 2018-01-01 PROCEDURE — 25010000002 NA FERRIC GLUC CPLX PER 12.5 MG: Performed by: INTERNAL MEDICINE

## 2018-01-01 PROCEDURE — C1729 CATH, DRAINAGE: HCPCS

## 2018-01-01 PROCEDURE — 63710000001 ASPIRIN 81 MG CHEWABLE TABLET: Performed by: INTERNAL MEDICINE

## 2018-01-01 PROCEDURE — C1769 GUIDE WIRE: HCPCS | Performed by: INTERNAL MEDICINE

## 2018-01-01 PROCEDURE — 63710000001 VITAMIN B-12 1000 MCG TABLET: Performed by: INTERNAL MEDICINE

## 2018-01-01 PROCEDURE — 77012 CT SCAN FOR NEEDLE BIOPSY: CPT

## 2018-01-01 PROCEDURE — 92526 ORAL FUNCTION THERAPY: CPT

## 2018-01-01 RX ORDER — BUMETANIDE 1 MG/1
TABLET ORAL
Qty: 270 TABLET | Refills: 1 | Status: SHIPPED | OUTPATIENT
Start: 2018-01-01

## 2018-01-01 RX ORDER — METOPROLOL SUCCINATE 25 MG/1
12.5 TABLET, EXTENDED RELEASE ORAL EVERY MORNING
Qty: 45 TABLET | Refills: 3 | Status: SHIPPED | OUTPATIENT
Start: 2018-01-01

## 2018-01-01 RX ORDER — FLUTICASONE PROPIONATE 50 MCG
2 SPRAY, SUSPENSION (ML) NASAL DAILY
COMMUNITY

## 2018-01-01 RX ORDER — BUMETANIDE 2 MG/1
2 TABLET ORAL DAILY
COMMUNITY
End: 2018-01-01 | Stop reason: HOSPADM

## 2018-01-01 RX ORDER — LEVOTHYROXINE SODIUM 0.12 MG/1
125 TABLET ORAL
Status: DISCONTINUED | OUTPATIENT
Start: 2018-01-01 | End: 2018-01-01 | Stop reason: HOSPADM

## 2018-01-01 RX ORDER — MELATONIN
1000 DAILY
Status: DISCONTINUED | OUTPATIENT
Start: 2018-01-01 | End: 2018-01-01 | Stop reason: HOSPADM

## 2018-01-01 RX ORDER — ASPIRIN 325 MG
325 TABLET, DELAYED RELEASE (ENTERIC COATED) ORAL DAILY
Status: CANCELLED | OUTPATIENT
Start: 2018-01-01

## 2018-01-01 RX ORDER — MORPHINE SULFATE 2 MG/ML
1 INJECTION, SOLUTION INTRAMUSCULAR; INTRAVENOUS EVERY 4 HOURS PRN
Status: DISCONTINUED | OUTPATIENT
Start: 2018-01-01 | End: 2018-01-01 | Stop reason: HOSPADM

## 2018-01-01 RX ORDER — ACETAMINOPHEN 325 MG/1
650 TABLET ORAL EVERY 4 HOURS PRN
Status: DISCONTINUED | OUTPATIENT
Start: 2018-01-01 | End: 2018-01-01 | Stop reason: HOSPADM

## 2018-01-01 RX ORDER — NORTRIPTYLINE HYDROCHLORIDE 10 MG/1
10 CAPSULE ORAL NIGHTLY
COMMUNITY
End: 2018-01-01

## 2018-01-01 RX ORDER — SODIUM CHLORIDE 9 MG/ML
1-3 INJECTION, SOLUTION INTRAVENOUS CONTINUOUS
Status: DISCONTINUED | OUTPATIENT
Start: 2018-01-01 | End: 2018-01-01

## 2018-01-01 RX ORDER — ATORVASTATIN CALCIUM 10 MG/1
10 TABLET, FILM COATED ORAL NIGHTLY
Status: DISCONTINUED | OUTPATIENT
Start: 2018-01-01 | End: 2018-01-01 | Stop reason: HOSPADM

## 2018-01-01 RX ORDER — DIAZEPAM 2 MG/1
2 TABLET ORAL EVERY 6 HOURS PRN
Status: DISCONTINUED | OUTPATIENT
Start: 2018-01-01 | End: 2018-01-01 | Stop reason: HOSPADM

## 2018-01-01 RX ORDER — CEFTRIAXONE SODIUM 1 G/50ML
1 INJECTION, SOLUTION INTRAVENOUS EVERY 24 HOURS
Status: DISCONTINUED | OUTPATIENT
Start: 2018-01-01 | End: 2018-01-01

## 2018-01-01 RX ORDER — NORTRIPTYLINE HYDROCHLORIDE 25 MG/1
25 CAPSULE ORAL NIGHTLY
Status: DISCONTINUED | OUTPATIENT
Start: 2018-01-01 | End: 2018-01-01 | Stop reason: HOSPADM

## 2018-01-01 RX ORDER — PANTOPRAZOLE SODIUM 40 MG/1
40 TABLET, DELAYED RELEASE ORAL
Status: DISCONTINUED | OUTPATIENT
Start: 2018-01-01 | End: 2018-01-01 | Stop reason: HOSPADM

## 2018-01-01 RX ORDER — LANOLIN ALCOHOL/MO/W.PET/CERES
1000 CREAM (GRAM) TOPICAL DAILY
Status: DISCONTINUED | OUTPATIENT
Start: 2018-01-01 | End: 2018-01-01 | Stop reason: HOSPADM

## 2018-01-01 RX ORDER — BUMETANIDE 2 MG/1
2 TABLET ORAL DAILY
Status: DISCONTINUED | OUTPATIENT
Start: 2018-01-01 | End: 2018-01-01

## 2018-01-01 RX ORDER — MELOXICAM 7.5 MG/1
7.5 TABLET ORAL NIGHTLY
Status: DISCONTINUED | OUTPATIENT
Start: 2018-01-01 | End: 2018-01-01 | Stop reason: HOSPADM

## 2018-01-01 RX ORDER — ONDANSETRON 2 MG/ML
4 INJECTION INTRAMUSCULAR; INTRAVENOUS EVERY 6 HOURS PRN
Status: DISCONTINUED | OUTPATIENT
Start: 2018-01-01 | End: 2018-01-01 | Stop reason: HOSPADM

## 2018-01-01 RX ORDER — SODIUM CHLORIDE 0.9 % (FLUSH) 0.9 %
1-10 SYRINGE (ML) INJECTION AS NEEDED
Status: DISCONTINUED | OUTPATIENT
Start: 2018-01-01 | End: 2018-01-01 | Stop reason: HOSPADM

## 2018-01-01 RX ORDER — METOPROLOL SUCCINATE 25 MG/1
12.5 TABLET, EXTENDED RELEASE ORAL DAILY
Status: DISCONTINUED | OUTPATIENT
Start: 2018-01-01 | End: 2018-01-01 | Stop reason: HOSPADM

## 2018-01-01 RX ORDER — BUMETANIDE 1 MG/1
2 TABLET ORAL
Status: DISCONTINUED | OUTPATIENT
Start: 2018-01-01 | End: 2018-01-01 | Stop reason: HOSPADM

## 2018-01-01 RX ORDER — ROPINIROLE 0.5 MG/1
1 TABLET, FILM COATED ORAL EVERY 8 HOURS
Status: DISCONTINUED | OUTPATIENT
Start: 2018-01-01 | End: 2018-01-01 | Stop reason: HOSPADM

## 2018-01-01 RX ORDER — NALOXONE HCL 0.4 MG/ML
0.4 VIAL (ML) INJECTION
Status: DISCONTINUED | OUTPATIENT
Start: 2018-01-01 | End: 2018-01-01 | Stop reason: HOSPADM

## 2018-01-01 RX ORDER — ONDANSETRON 2 MG/ML
4 INJECTION INTRAMUSCULAR; INTRAVENOUS EVERY 6 HOURS PRN
Status: CANCELLED | OUTPATIENT
Start: 2018-01-01

## 2018-01-01 RX ORDER — LEVOTHYROXINE SODIUM 0.15 MG/1
150 TABLET ORAL
Status: DISCONTINUED | OUTPATIENT
Start: 2018-01-01 | End: 2018-01-01

## 2018-01-01 RX ORDER — POTASSIUM CHLORIDE 750 MG/1
10 CAPSULE, EXTENDED RELEASE ORAL DAILY
Status: DISCONTINUED | OUTPATIENT
Start: 2018-01-01 | End: 2018-01-01

## 2018-01-01 RX ORDER — SODIUM CHLORIDE 0.9 % (FLUSH) 0.9 %
1-10 SYRINGE (ML) INJECTION AS NEEDED
Status: CANCELLED | OUTPATIENT
Start: 2018-01-01

## 2018-01-01 RX ORDER — BUMETANIDE 1 MG/1
TABLET ORAL
Qty: 270 TABLET | Refills: 1 | Status: SHIPPED | OUTPATIENT
Start: 2018-01-01 | End: 2018-01-01 | Stop reason: SDUPTHER

## 2018-01-01 RX ORDER — HYDROCODONE BITARTRATE AND ACETAMINOPHEN 5; 325 MG/1; MG/1
1 TABLET ORAL EVERY 4 HOURS PRN
Status: DISCONTINUED | OUTPATIENT
Start: 2018-01-01 | End: 2018-01-01

## 2018-01-01 RX ORDER — FLUTICASONE PROPIONATE 50 MCG
2 SPRAY, SUSPENSION (ML) NASAL DAILY
Status: DISCONTINUED | OUTPATIENT
Start: 2018-01-01 | End: 2018-01-01 | Stop reason: HOSPADM

## 2018-01-01 RX ORDER — POTASSIUM CHLORIDE 750 MG/1
20 CAPSULE, EXTENDED RELEASE ORAL DAILY
Status: DISCONTINUED | OUTPATIENT
Start: 2018-01-01 | End: 2018-01-01 | Stop reason: HOSPADM

## 2018-01-01 RX ORDER — POTASSIUM CHLORIDE 750 MG/1
20 CAPSULE, EXTENDED RELEASE ORAL DAILY
Qty: 180 CAPSULE | Refills: 1 | Status: SHIPPED | OUTPATIENT
Start: 2018-01-01

## 2018-01-01 RX ORDER — NORTRIPTYLINE HYDROCHLORIDE 25 MG/1
25 CAPSULE ORAL NIGHTLY
COMMUNITY
Start: 2018-01-01

## 2018-01-01 RX ORDER — ASPIRIN 325 MG
325 TABLET, DELAYED RELEASE (ENTERIC COATED) ORAL DAILY
Status: DISCONTINUED | OUTPATIENT
Start: 2018-01-01 | End: 2018-01-01

## 2018-01-01 RX ORDER — POTASSIUM CHLORIDE 750 MG/1
20 CAPSULE, EXTENDED RELEASE ORAL DAILY
Qty: 60 CAPSULE | Refills: 11 | Status: SHIPPED | OUTPATIENT
Start: 2018-01-01 | End: 2018-01-01 | Stop reason: SDUPTHER

## 2018-01-01 RX ORDER — WARFARIN SODIUM 2 MG/1
TABLET ORAL
Qty: 90 TABLET | Refills: 3 | OUTPATIENT
Start: 2018-01-01 | End: 2018-01-01 | Stop reason: HOSPADM

## 2018-01-01 RX ORDER — SENNA AND DOCUSATE SODIUM 50; 8.6 MG/1; MG/1
2 TABLET, FILM COATED ORAL NIGHTLY
Status: DISCONTINUED | OUTPATIENT
Start: 2018-01-01 | End: 2018-01-01 | Stop reason: HOSPADM

## 2018-01-01 RX ORDER — POTASSIUM CHLORIDE 750 MG/1
10 CAPSULE, EXTENDED RELEASE ORAL DAILY
COMMUNITY
End: 2018-01-01 | Stop reason: HOSPADM

## 2018-01-01 RX ORDER — LIDOCAINE HYDROCHLORIDE 10 MG/ML
10 INJECTION, SOLUTION EPIDURAL; INFILTRATION; INTRACAUDAL; PERINEURAL ONCE
Status: COMPLETED | OUTPATIENT
Start: 2018-01-01 | End: 2018-01-01

## 2018-01-01 RX ORDER — ASPIRIN 325 MG
325 TABLET ORAL ONCE
Status: CANCELLED | OUTPATIENT
Start: 2018-01-01 | End: 2018-01-01

## 2018-01-01 RX ORDER — ASPIRIN 325 MG
325 TABLET ORAL ONCE
Status: DISCONTINUED | OUTPATIENT
Start: 2018-01-01 | End: 2018-01-01

## 2018-01-01 RX ORDER — HYDROCODONE BITARTRATE AND ACETAMINOPHEN 7.5; 325 MG/1; MG/1
1 TABLET ORAL EVERY 4 HOURS PRN
Status: DISCONTINUED | OUTPATIENT
Start: 2018-01-01 | End: 2018-01-01 | Stop reason: HOSPADM

## 2018-01-01 RX ORDER — ALPRAZOLAM 0.25 MG/1
0.25 TABLET ORAL 3 TIMES DAILY PRN
Status: DISCONTINUED | OUTPATIENT
Start: 2018-01-01 | End: 2018-01-01 | Stop reason: HOSPADM

## 2018-01-01 RX ORDER — DIAZEPAM 2 MG/1
2 TABLET ORAL EVERY 8 HOURS PRN
Qty: 30 TABLET | Refills: 0 | Status: SHIPPED | OUTPATIENT
Start: 2018-01-01 | End: 2019-09-18

## 2018-01-01 RX ORDER — NITROGLYCERIN 0.4 MG/1
0.4 TABLET SUBLINGUAL
Status: CANCELLED | OUTPATIENT
Start: 2018-01-01

## 2018-01-01 RX ORDER — ASPIRIN 81 MG/1
81 TABLET, CHEWABLE ORAL DAILY
Status: DISCONTINUED | OUTPATIENT
Start: 2018-01-01 | End: 2018-01-01 | Stop reason: HOSPADM

## 2018-01-01 RX ORDER — ACETAMINOPHEN 325 MG/1
650 TABLET ORAL EVERY 4 HOURS PRN
Status: CANCELLED | OUTPATIENT
Start: 2018-01-01

## 2018-01-01 RX ORDER — DULOXETIN HYDROCHLORIDE 60 MG/1
60 CAPSULE, DELAYED RELEASE ORAL DAILY
Qty: 30 CAPSULE | Refills: 1 | Status: SHIPPED | OUTPATIENT
Start: 2018-01-01 | End: 2018-01-01

## 2018-01-01 RX ORDER — LEVOTHYROXINE SODIUM 0.12 MG/1
125 TABLET ORAL DAILY
Qty: 30 TABLET | Refills: 2 | Status: SHIPPED | OUTPATIENT
Start: 2018-01-01

## 2018-01-01 RX ORDER — NITROGLYCERIN 0.4 MG/1
0.4 TABLET SUBLINGUAL
Status: DISCONTINUED | OUTPATIENT
Start: 2018-01-01 | End: 2018-01-01 | Stop reason: HOSPADM

## 2018-01-01 RX ORDER — TRIAMCINOLONE ACETONIDE 1 MG/G
1 CREAM TOPICAL WEEKLY
COMMUNITY

## 2018-01-01 RX ADMIN — PANCRELIPASE 24000 UNITS OF LIPASE: 24000; 76000; 120000 CAPSULE, DELAYED RELEASE PELLETS ORAL at 08:57

## 2018-01-01 RX ADMIN — VITAMIN D, TAB 1000IU (100/BT) 1000 UNITS: 25 TAB at 08:49

## 2018-01-01 RX ADMIN — VITAMIN D, TAB 1000IU (100/BT) 1000 UNITS: 25 TAB at 08:37

## 2018-01-01 RX ADMIN — ACETAMINOPHEN 650 MG: 325 TABLET ORAL at 00:09

## 2018-01-01 RX ADMIN — CEFTRIAXONE SODIUM 1 G: 1 INJECTION, SOLUTION INTRAVENOUS at 08:36

## 2018-01-01 RX ADMIN — NORTRIPTYLINE HYDROCHLORIDE 25 MG: 25 CAPSULE ORAL at 21:13

## 2018-01-01 RX ADMIN — PANCRELIPASE 24000 UNITS OF LIPASE: 24000; 76000; 120000 CAPSULE, DELAYED RELEASE PELLETS ORAL at 08:49

## 2018-01-01 RX ADMIN — ROPINIROLE 1 MG: 0.5 TABLET, FILM COATED ORAL at 05:16

## 2018-01-01 RX ADMIN — METOPROLOL SUCCINATE 12.5 MG: 25 TABLET, EXTENDED RELEASE ORAL at 09:42

## 2018-01-01 RX ADMIN — FLUTICASONE PROPIONATE 2 SPRAY: 50 SPRAY, METERED NASAL at 08:58

## 2018-01-01 RX ADMIN — ACETAMINOPHEN 650 MG: 325 TABLET ORAL at 19:26

## 2018-01-01 RX ADMIN — LEVOTHYROXINE SODIUM 150 MCG: 150 TABLET ORAL at 05:56

## 2018-01-01 RX ADMIN — NORTRIPTYLINE HYDROCHLORIDE 25 MG: 25 CAPSULE ORAL at 21:36

## 2018-01-01 RX ADMIN — CEFTRIAXONE SODIUM 1 G: 1 INJECTION, SOLUTION INTRAVENOUS at 09:41

## 2018-01-01 RX ADMIN — ROPINIROLE 1 MG: 0.5 TABLET, FILM COATED ORAL at 15:23

## 2018-01-01 RX ADMIN — BARIUM SULFATE 50 ML: 400 SUSPENSION ORAL at 09:52

## 2018-01-01 RX ADMIN — BUMETANIDE 2 MG: 2 TABLET ORAL at 08:49

## 2018-01-01 RX ADMIN — BARIUM SULFATE 100 ML: 0.81 POWDER, FOR SUSPENSION ORAL at 09:52

## 2018-01-01 RX ADMIN — BUMETANIDE 2 MG: 2 TABLET ORAL at 18:09

## 2018-01-01 RX ADMIN — HYDROCODONE BITARTRATE AND ACETAMINOPHEN 1 TABLET: 7.5; 325 TABLET ORAL at 02:56

## 2018-01-01 RX ADMIN — BUMETANIDE 2 MG: 2 TABLET ORAL at 18:38

## 2018-01-01 RX ADMIN — HYDROCODONE BITARTRATE AND ACETAMINOPHEN 1 TABLET: 7.5; 325 TABLET ORAL at 21:35

## 2018-01-01 RX ADMIN — ROPINIROLE 1 MG: 0.5 TABLET, FILM COATED ORAL at 21:13

## 2018-01-01 RX ADMIN — SODIUM CHLORIDE 3.04 ML/KG/HR: 9 INJECTION, SOLUTION INTRAVENOUS at 10:52

## 2018-01-01 RX ADMIN — ATORVASTATIN CALCIUM 10 MG: 10 TABLET, FILM COATED ORAL at 21:34

## 2018-01-01 RX ADMIN — DOCUSATE SODIUM,SENNOSIDES 2 TABLET: 50; 8.6 TABLET, FILM COATED ORAL at 21:34

## 2018-01-01 RX ADMIN — ROPINIROLE 1 MG: 0.5 TABLET, FILM COATED ORAL at 06:41

## 2018-01-01 RX ADMIN — PANCRELIPASE 24000 UNITS OF LIPASE: 24000; 76000; 120000 CAPSULE, DELAYED RELEASE PELLETS ORAL at 12:27

## 2018-01-01 RX ADMIN — BUMETANIDE 2 MG: 2 TABLET ORAL at 16:58

## 2018-01-01 RX ADMIN — METOPROLOL SUCCINATE 12.5 MG: 25 TABLET, EXTENDED RELEASE ORAL at 08:37

## 2018-01-01 RX ADMIN — CYANOCOBALAMIN TAB 1000 MCG 1000 MCG: 1000 TAB at 08:49

## 2018-01-01 RX ADMIN — MELOXICAM 7.5 MG: 7.5 TABLET ORAL at 20:53

## 2018-01-01 RX ADMIN — HYDROCODONE BITARTRATE AND ACETAMINOPHEN 1 TABLET: 7.5; 325 TABLET ORAL at 11:38

## 2018-01-01 RX ADMIN — BUMETANIDE 2 MG: 2 TABLET ORAL at 08:37

## 2018-01-01 RX ADMIN — MELOXICAM 7.5 MG: 7.5 TABLET ORAL at 21:27

## 2018-01-01 RX ADMIN — HYDROCODONE BITARTRATE AND ACETAMINOPHEN 1 TABLET: 7.5; 325 TABLET ORAL at 09:48

## 2018-01-01 RX ADMIN — ACETAMINOPHEN 650 MG: 325 TABLET ORAL at 01:04

## 2018-01-01 RX ADMIN — PANTOPRAZOLE SODIUM 40 MG: 40 TABLET, DELAYED RELEASE ORAL at 06:41

## 2018-01-01 RX ADMIN — BUMETANIDE 2 MG: 2 TABLET ORAL at 08:57

## 2018-01-01 RX ADMIN — CEFTRIAXONE SODIUM 1 G: 1 INJECTION, SOLUTION INTRAVENOUS at 10:56

## 2018-01-01 RX ADMIN — PANCRELIPASE 24000 UNITS OF LIPASE: 24000; 76000; 120000 CAPSULE, DELAYED RELEASE PELLETS ORAL at 08:37

## 2018-01-01 RX ADMIN — ROPINIROLE 1 MG: 0.5 TABLET, FILM COATED ORAL at 21:34

## 2018-01-01 RX ADMIN — ROPINIROLE 1 MG: 0.5 TABLET, FILM COATED ORAL at 05:56

## 2018-01-01 RX ADMIN — ROPINIROLE 1 MG: 0.5 TABLET, FILM COATED ORAL at 21:42

## 2018-01-01 RX ADMIN — POTASSIUM CHLORIDE 10 MEQ: 750 CAPSULE, EXTENDED RELEASE ORAL at 09:42

## 2018-01-01 RX ADMIN — PANCRELIPASE 24000 UNITS OF LIPASE: 24000; 76000; 120000 CAPSULE, DELAYED RELEASE PELLETS ORAL at 11:38

## 2018-01-01 RX ADMIN — ATORVASTATIN CALCIUM 10 MG: 10 TABLET, FILM COATED ORAL at 21:27

## 2018-01-01 RX ADMIN — PANTOPRAZOLE SODIUM 40 MG: 40 TABLET, DELAYED RELEASE ORAL at 05:56

## 2018-01-01 RX ADMIN — ROPINIROLE 1 MG: 0.5 TABLET, FILM COATED ORAL at 05:12

## 2018-01-01 RX ADMIN — PANCRELIPASE 24000 UNITS OF LIPASE: 24000; 76000; 120000 CAPSULE, DELAYED RELEASE PELLETS ORAL at 21:27

## 2018-01-01 RX ADMIN — BUMETANIDE 2 MG: 2 TABLET ORAL at 09:42

## 2018-01-01 RX ADMIN — PANTOPRAZOLE SODIUM 40 MG: 40 TABLET, DELAYED RELEASE ORAL at 05:17

## 2018-01-01 RX ADMIN — FLUTICASONE PROPIONATE 2 SPRAY: 50 SPRAY, METERED NASAL at 08:38

## 2018-01-01 RX ADMIN — ATORVASTATIN CALCIUM 10 MG: 10 TABLET, FILM COATED ORAL at 21:13

## 2018-01-01 RX ADMIN — ROPINIROLE 1 MG: 0.5 TABLET, FILM COATED ORAL at 14:35

## 2018-01-01 RX ADMIN — BARIUM SULFATE 10 ML: 400 SUSPENSION ORAL at 09:52

## 2018-01-01 RX ADMIN — CYANOCOBALAMIN TAB 1000 MCG 1000 MCG: 1000 TAB at 08:37

## 2018-01-01 RX ADMIN — ROPINIROLE 1 MG: 0.5 TABLET, FILM COATED ORAL at 14:07

## 2018-01-01 RX ADMIN — PANCRELIPASE 24000 UNITS OF LIPASE: 24000; 76000; 120000 CAPSULE, DELAYED RELEASE PELLETS ORAL at 18:09

## 2018-01-01 RX ADMIN — VITAMIN D, TAB 1000IU (100/BT) 1000 UNITS: 25 TAB at 08:57

## 2018-01-01 RX ADMIN — ROPINIROLE 1 MG: 0.5 TABLET, FILM COATED ORAL at 20:53

## 2018-01-01 RX ADMIN — BARIUM SULFATE 20 ML: 400 PASTE ORAL at 09:52

## 2018-01-01 RX ADMIN — ALPRAZOLAM 0.25 MG: 0.25 TABLET ORAL at 00:09

## 2018-01-01 RX ADMIN — DOCUSATE SODIUM,SENNOSIDES 2 TABLET: 50; 8.6 TABLET, FILM COATED ORAL at 21:27

## 2018-01-01 RX ADMIN — SODIUM CHLORIDE 62.5 MG: 9 INJECTION, SOLUTION INTRAVENOUS at 11:00

## 2018-01-01 RX ADMIN — POTASSIUM CHLORIDE 10 MEQ: 750 CAPSULE, EXTENDED RELEASE ORAL at 08:37

## 2018-01-01 RX ADMIN — DIAZEPAM 2 MG: 2 TABLET ORAL at 01:04

## 2018-01-01 RX ADMIN — HYDROCODONE BITARTRATE AND ACETAMINOPHEN 1 TABLET: 7.5; 325 TABLET ORAL at 23:01

## 2018-01-01 RX ADMIN — DOCUSATE SODIUM,SENNOSIDES 2 TABLET: 50; 8.6 TABLET, FILM COATED ORAL at 21:13

## 2018-01-01 RX ADMIN — LEVOTHYROXINE SODIUM 150 MCG: 150 TABLET ORAL at 05:16

## 2018-01-01 RX ADMIN — POTASSIUM CHLORIDE 10 MEQ: 750 CAPSULE, EXTENDED RELEASE ORAL at 08:57

## 2018-01-01 RX ADMIN — MELOXICAM 7.5 MG: 7.5 TABLET ORAL at 21:13

## 2018-01-01 RX ADMIN — SODIUM CHLORIDE 62.5 MG: 9 INJECTION, SOLUTION INTRAVENOUS at 16:57

## 2018-01-01 RX ADMIN — NORTRIPTYLINE HYDROCHLORIDE 25 MG: 25 CAPSULE ORAL at 21:26

## 2018-01-01 RX ADMIN — DOCUSATE SODIUM,SENNOSIDES 2 TABLET: 50; 8.6 TABLET, FILM COATED ORAL at 20:53

## 2018-01-01 RX ADMIN — PANCRELIPASE 24000 UNITS OF LIPASE: 24000; 76000; 120000 CAPSULE, DELAYED RELEASE PELLETS ORAL at 12:53

## 2018-01-01 RX ADMIN — ATORVASTATIN CALCIUM 10 MG: 10 TABLET, FILM COATED ORAL at 20:53

## 2018-01-01 RX ADMIN — ASPIRIN 81 MG 81 MG: 81 TABLET ORAL at 08:37

## 2018-01-01 RX ADMIN — PANCRELIPASE 24000 UNITS OF LIPASE: 24000; 76000; 120000 CAPSULE, DELAYED RELEASE PELLETS ORAL at 19:30

## 2018-01-01 RX ADMIN — ASPIRIN 81 MG 81 MG: 81 TABLET ORAL at 09:43

## 2018-01-01 RX ADMIN — CYANOCOBALAMIN TAB 1000 MCG 1000 MCG: 1000 TAB at 08:57

## 2018-01-01 RX ADMIN — VITAMIN D, TAB 1000IU (100/BT) 1000 UNITS: 25 TAB at 09:43

## 2018-01-01 RX ADMIN — FLUTICASONE PROPIONATE 2 SPRAY: 50 SPRAY, METERED NASAL at 08:48

## 2018-01-01 RX ADMIN — PANTOPRAZOLE SODIUM 40 MG: 40 TABLET, DELAYED RELEASE ORAL at 05:16

## 2018-01-01 RX ADMIN — ACETAMINOPHEN 650 MG: 325 TABLET ORAL at 14:07

## 2018-01-01 RX ADMIN — LIDOCAINE HYDROCHLORIDE 10 ML: 10 INJECTION, SOLUTION EPIDURAL; INFILTRATION; INTRACAUDAL; PERINEURAL at 15:28

## 2018-01-01 RX ADMIN — FLUTICASONE PROPIONATE 2 SPRAY: 50 SPRAY, METERED NASAL at 09:41

## 2018-01-01 RX ADMIN — PANCRELIPASE 24000 UNITS OF LIPASE: 24000; 76000; 120000 CAPSULE, DELAYED RELEASE PELLETS ORAL at 09:42

## 2018-01-01 RX ADMIN — NORTRIPTYLINE HYDROCHLORIDE 25 MG: 25 CAPSULE ORAL at 20:53

## 2018-01-01 RX ADMIN — ASPIRIN 81 MG 81 MG: 81 TABLET ORAL at 08:57

## 2018-01-01 RX ADMIN — LEVOTHYROXINE SODIUM 125 MCG: 125 TABLET ORAL at 05:13

## 2018-01-01 RX ADMIN — MORPHINE SULFATE 1 MG: 2 INJECTION, SOLUTION INTRAMUSCULAR; INTRAVENOUS at 11:55

## 2018-01-01 RX ADMIN — CYANOCOBALAMIN TAB 1000 MCG 1000 MCG: 1000 TAB at 09:43

## 2018-01-01 RX ADMIN — POTASSIUM CHLORIDE 20 MEQ: 750 CAPSULE, EXTENDED RELEASE ORAL at 08:49

## 2018-01-01 RX ADMIN — MELOXICAM 7.5 MG: 7.5 TABLET ORAL at 21:34

## 2018-01-01 RX ADMIN — ROPINIROLE 1 MG: 0.5 TABLET, FILM COATED ORAL at 14:20

## 2018-01-01 RX ADMIN — PANCRELIPASE 24000 UNITS OF LIPASE: 24000; 76000; 120000 CAPSULE, DELAYED RELEASE PELLETS ORAL at 21:36

## 2018-01-01 RX ADMIN — ASPIRIN 81 MG 81 MG: 81 TABLET ORAL at 08:49

## 2018-01-02 DIAGNOSIS — Z79.899 HIGH RISK MEDICATION USE: Primary | ICD-10-CM

## 2018-01-03 ENCOUNTER — ANTICOAGULATION VISIT (OUTPATIENT)
Dept: PHARMACY | Facility: HOSPITAL | Age: 83
End: 2018-01-03

## 2018-01-03 LAB — INR PPP: 1.7

## 2018-01-03 NOTE — PROGRESS NOTES
Anticoagulation Clinic - Remote Progress Note  HOME MONITOR  Frequency of Monitoring: every Wednesday    Indication: afib  Referring Provider: Cyndi  Initial Warfarin Start Date: 15 years aog  Goal INR: 2-3  Current Drug Interactions: aspirin, levothyroxine, ropinirole  CHADS-VASc: 5 (age, gender, HTN, CHF)    Diet: frequent, consistent  Alcohol: none  Tobacco: none  OTC Pain Medication: Excedrin (meloxicam)    1st clinic visit: 10/18/17    INR History:  Date  10/4 10/9 10/18 10/25 11/1 11/8 11/15 11/22   Total Weekly Dose 16.5 mg 14mg 14mg 14mg 11mg 9mg 10mg 10mg 9mg   INR  1.8 2.5 3.8 3.4 1.9 2.2 3.3 2.7   Notes pre- amio amio start 9/27 (BID)  clinic    Macrobid      Date 11/29 12/6 12/11 12/13 12/20 12/27 1/03     Total Weekly Dose 9mg 9mg 10mg 11mg 10mg 9mg 12mg 13mg    INR 2.5 1.8 1.84 2.5 2.2 1.7 1.7     Notes   PAT  amio stop 12/14 1 missed dose boost       Phone Interview:  Tablet Strength: pt has 2mg tablets  Current Maintenance Dose: 1mg daily except 2mg MonWedFri     Patient Findings      Positives Other complaints     Negatives Signs/symptoms of thrombosis, Signs/symptoms of bleeding, Laboratory test error suspected, Change in health, Change in alcohol use, Change in activity, Upcoming invasive procedure, Emergency department visit, Upcoming dental procedure, Missed doses, Extra doses, Change in medications, Change in diet/appetite, Hospital admission, Bruising     Comments Patient's daughter reports that she has lost 11lbs of water in 5 days. Her daughter reports that she is gradually getting off of her oxygen.      Patient Contact Info: [phone] (Nazia Britton, daughter)  Lab Contact Info: [phone] mdINR    Plan:  1. INR is subtherapeutic today, despite dose increase- possibly due to d/c of amio. Have therefore instructed Mrs. Britton to increase her mother's dose to warfarin 2mg daily except 1mg Mon. Will recheck INR on Monday to determine if additional increase is needed on Monday.  She will likely require additional dose increases over the next few months with amio d/c.  2. Repeat INR in 1 week.  3. Verbal information provided over the phone to pt's dtr, Nazia Britton. She expresses understanding with teach back and has no further questions at this time.    Lita Pascual, PharmD  1/3/2018  11:16 AM

## 2018-01-06 NOTE — TELEPHONE ENCOUNTER
Agree.  Looks like we could go back to 3 mg two days a week if needed.  Could add spironolactone but right now potassium looks ok.

## 2018-01-08 ENCOUNTER — ANTICOAGULATION VISIT (OUTPATIENT)
Dept: PHARMACY | Facility: HOSPITAL | Age: 83
End: 2018-01-08

## 2018-01-08 LAB — INR PPP: 1.9

## 2018-01-08 NOTE — PROGRESS NOTES
Anticoagulation Clinic - Remote Progress Note  HOME MONITOR  Frequency of Monitoring: every Wednesday    Indication: afib  Referring Provider: Cyndi  Initial Warfarin Start Date: 15 years aog  Goal INR: 2-3  Current Drug Interactions: aspirin, levothyroxine, ropinirole  CHADS-VASc: 5 (age, gender, HTN, CHF)    Diet: frequent, consistent  Alcohol: none  Tobacco: none  OTC Pain Medication: Excedrin (meloxicam)    1st clinic visit: 10/18/17    INR History:  Date  10/4 10/9 10/18 10/25 11/1 11/8 11/15 11/22   Total Weekly Dose 16.5 mg 14mg 14mg 14mg 11mg 9mg 10mg 10mg 9mg   INR  1.8 2.5 3.8 3.4 1.9 2.2 3.3 2.7   Notes pre- amio amio start 9/27 (BID)  clinic    Macrobid      Date 11/29 12/6 12/11 12/13 12/20 12/27 1/3/18 1/8    Total Weekly Dose 9mg 9mg 10mg 11mg 10mg 9mg 12mg 13mg 14mg   INR 2.5 1.8 1.84 2.5 2.2 1.7 1.7 1.9    Notes   PAT  amio stop 12/14 1 missed dose boost       Phone Interview:  Tablet Strength: pt has 2mg tablets  Current Maintenance Dose: 2mg daily  Patient Findings      Negatives Signs/symptoms of thrombosis, Signs/symptoms of bleeding, Laboratory test error suspected, Change in health, Change in alcohol use, Change in activity, Upcoming invasive procedure, Emergency department visit, Upcoming dental procedure, Missed doses, Extra doses, Change in medications, Change in diet/appetite, Hospital admission, Bruising, Other complaints     Comments Mrs. Judge has lost a total of 16lbs of water over the last couple weeks.     Patient Contact Info: [phone] (Nazia Britton, daughter)  Lab Contact Info: [phone] mdINR    Plan:  1. INR is slightly subtherapeutic today, but improved since last week. Will instruct Mrs. Britton to have her mother continue warfarin 2mg daily for now. Mrs. Judge will likely require additional dose increases over the next couple months with amio d/c (she was on 16.5mg weekly dose prior to amio initiation).  2. Repeat INR in 1 week.  3. Verbal information  provided over the phone to pt's dtr, Nazia Britton. She expresses understanding with teach back and has no further questions at this time.    Gisela Thornton RP  1/8/2018  11:33 AM

## 2018-01-15 ENCOUNTER — ANTICOAGULATION VISIT (OUTPATIENT)
Dept: PHARMACY | Facility: HOSPITAL | Age: 83
End: 2018-01-15

## 2018-01-15 LAB — INR PPP: 2.2

## 2018-01-15 NOTE — PROGRESS NOTES
Anticoagulation Clinic - Remote Progress Note  HOME MONITOR  Frequency of Monitoring: every Wednesday    Indication: afib  Referring Provider: Cyndi  Initial Warfarin Start Date: 15 years aog  Goal INR: 2-3  Current Drug Interactions: aspirin, levothyroxine, ropinirole  CHADS-VASc: 5 (age, gender, HTN, CHF)    Diet: frequent, consistent  Alcohol: none  Tobacco: none  OTC Pain Medication: Excedrin (meloxicam)    1st clinic visit: 10/18/17    INR History:  Date  10/4 10/9 10/18 10/25 11/1 11/8 11/15 11/22   Total Weekly Dose 16.5 mg 14mg 14mg 14mg 11mg 9mg 10mg 10mg 9mg   INR  1.8 2.5 3.8 3.4 1.9 2.2 3.3 2.7   Notes pre- amio amio start 9/27 (BID)  clinic    Macrobid      Date 11/29 12/6 12/11 12/13 12/20 12/27 1/3/18 1/8 1/15   Total Weekly Dose 9mg 9mg 10mg 11mg 10mg 9mg 12mg 13mg 14mg   INR 2.5 1.8 1.84 2.5 2.2 1.7 1.7 1.9 2.2   Notes   PAT  amio stop 12/14 1 missed dose boost       Phone Interview:  Tablet Strength: pt has 2mg tablets  Current Maintenance Dose: 2mg daily  Patient Findings      Negatives Signs/symptoms of thrombosis, Signs/symptoms of bleeding, Laboratory test error suspected, Change in health, Change in alcohol use, Change in activity, Upcoming invasive procedure, Emergency department visit, Upcoming dental procedure, Missed doses, Extra doses, Change in medications, Change in diet/appetite, Hospital admission, Bruising, Other complaints   Patient Contact Info: [phone] (Nazia Britton, daughter)  Lab Contact Info: [phone] mdINR    Plan:  1. INR is therapeutic today at 2.2. Will instruct Mrs. Britton to have her mother continue warfarin 2mg daily.    Note: Mrs. Judge will likely require additional dose increases over the next couple months with amio d/c (she was on 16.5mg weekly dose prior to amio initiation).  2. Repeat INR in 1 week. Patient usually tests on Wednesday.   3. Verbal information provided over the phone to pt's dtr, Nazia Britton. She expresses understanding  with teach back and has no further questions at this time.    Jaylene Quinn, Pharmacy Intern  1/15/2018  11:30 AM       I, Lita Pascual, PharmD, have reviewed the note in full and agree with the assessment and plan.  01/15/18  4:24 PM

## 2018-02-07 NOTE — PROGRESS NOTES
Anticoagulation Clinic - Remote Progress Note  HOME MONITOR  Frequency of Monitoring: weekly, every Wednesday    Indication: afib  Referring Provider: Cyndi  Initial Warfarin Start Date: 15 years aog  Goal INR: 2-3  Current Drug Interactions: aspirin, levothyroxine, ropinirole  CHADS-VASc: 5 (age, gender, HTN, CHF)    Diet: frequent, consistent  Alcohol: none  Tobacco: none  OTC Pain Medication: Excedrin (meloxicam)    1st clinic visit: 10/18/17    INR History:  Date  10/4 10/9 10/18 10/25 11/1 11/8 11/15 11/22   Total Weekly Dose 16.5 mg 14mg 14mg 14mg 11mg 9mg 10mg 10mg 9mg   INR  1.8 2.5 3.8 3.4 1.9 2.2 3.3 2.7   Notes pre- amio amio start 9/27 (BID)  clinic    Macrobid      Date 11/29 12/6 12/11 12/13 12/20 12/27 1/3/18 1/8 1/15   Total Weekly Dose 9mg 9mg 10mg 11mg 10mg 9mg 12mg 13mg 14mg   INR 2.5 1.8 1.84 2.5 2.2 1.7 1.7 1.9 2.2   Notes   PAT  amio stop 12/14 1 missed dose boost       Date 1/24 1/31 2/7         Total Weekly Dose 14mg 14mg 14mg         INR 2.6 2.7 2.4         Notes              Phone Interview:  Tablet Strength: pt has 2mg tablets  Current Maintenance Dose: 2mg daily    Patient Findings      Negatives Signs/symptoms of thrombosis, Signs/symptoms of bleeding, Laboratory test error suspected, Change in health, Change in alcohol use, Change in activity, Upcoming invasive procedure, Emergency department visit, Upcoming dental procedure, Missed doses, Extra doses, Change in medications, Change in diet/appetite, Hospital admission, Bruising, Other complaints     Patient Contact Info: [phone] (Nazia Britton, daughter)  Lab Contact Info: [phone] Josué    Plan:  1. INR is therapeutic today at 2.4. Instructed Mrs. Britton to have her mother continue warfarin 2mg daily.   2. Repeat INR in 1 week. Patient usually tests on Wednesday.   3. Verbal information provided over the phone to patient's daughter, Nazia Britton. She expresses understanding with teach back and has no further  questions at this time.  4. Patient's daughter declines the need for warfarin refills at this time.     Laura Alfonso, Pharmacy Technician  2/7/2018  11:16 AM     I, Toni Bernal, [facility], have reviewed the note in full and agree with the assessment and plan.  02/08/18  3:32 PM

## 2018-02-14 NOTE — PROGRESS NOTES
Anticoagulation Clinic - Remote Progress Note  HOME MONITOR  Frequency of Monitoring: weekly, every Wednesday    Indication: afib  Referring Provider: Cyndi  Initial Warfarin Start Date: 15 years aog  Goal INR: 2-3  Current Drug Interactions: aspirin, levothyroxine, ropinirole  CHADS-VASc: 5 (age, gender, HTN, CHF)    Diet: frequent, consistent  Alcohol: none  Tobacco: none  OTC Pain Medication: Excedrin (meloxicam)    1st clinic visit: 10/18/17    INR History:  Date  10/4 10/9 10/18 10/25 11/1 11/8 11/15 11/22   Total Weekly Dose 16.5 mg 14mg 14mg 14mg 11mg 9mg 10mg 10mg 9mg   INR  1.8 2.5 3.8 3.4 1.9 2.2 3.3 2.7   Notes pre- amio amio start 9/27 (BID)  clinic    Macrobid      Date 11/29 12/6 12/11 12/13 12/20 12/27 1/3/18 1/8 1/15   Total Weekly Dose 9mg 9mg 10mg 11mg 10mg 9mg 12mg 13mg 14mg   INR 2.5 1.8 1.84 2.5 2.2 1.7 1.7 1.9 2.2   Notes   PAT  amio stop 12/14 1 missed dose boost       Date 1/24 1/31 2/7 2/14        Total Weekly Dose 14mg 14mg 14mg 14mg        INR 2.6 2.7 2.4 2.9        Notes              Phone Interview:  Tablet Strength: pt has 2mg tablets  Current Maintenance Dose: 2mg daily    Patient Findings      Negatives Signs/symptoms of thrombosis, Signs/symptoms of bleeding, Laboratory test error suspected, Change in health, Change in alcohol use, Change in activity, Upcoming invasive procedure, Emergency department visit, Upcoming dental procedure, Missed doses, Extra doses, Change in medications, Change in diet/appetite, Hospital admission, Bruising, Other complaints   Patient Contact Info: [phone] (Nazia Britton, daughter)  Lab Contact Info: [phone] EnmanuelR    Plan:  1. INR is therapeutic today at 2.9. Instructed Mrs. Britton to have her mother continue warfarin 2mg daily.   2. Repeat INR in 1 week. Patient usually tests on Wednesday.   3. Verbal information provided over the phone to patient's daughter, Nazia Britton. She expresses understanding with teach back and has no  further questions at this time.  4. Patient's daughter declines the need for warfarin refills at this time.     Laura Alfonso, Pharmacy Technician  2/14/2018  11:32 AM       I, Lita Pascual, PharmD, have reviewed the note in full and agree with the assessment and plan.  02/14/18  5:13 PM

## 2018-02-21 NOTE — PROGRESS NOTES
Anticoagulation Clinic - Remote Progress Note  HOME MONITOR (MDINR)  Frequency of Monitoring: weekly, every Wednesday    Indication: afib  Referring Provider: Cyndi  Initial Warfarin Start Date: 15 years aog  Goal INR: 2-3  Current Drug Interactions: aspirin, levothyroxine, ropinirole  CHADS-VASc: 5 (age, gender, HTN, CHF)    Diet: frequent, consistent  Alcohol: none  Tobacco: none  OTC Pain Medication: Excedrin (meloxicam)    1st clinic visit: 10/18/17    INR History:  Date  10/4 10/9 10/18 10/25 11/1 11/8 11/15 11/22   Total Weekly Dose 16.5 mg 14mg 14mg 14mg 11mg 9mg 10mg 10mg 9mg   INR  1.8 2.5 3.8 3.4 1.9 2.2 3.3 2.7   Notes pre- amio amio start 9/27 (BID)  clinic    Macrobid      Date 11/29 12/6 12/11 12/13 12/20 12/27 1/3/18 1/8 1/15   Total Weekly Dose 9mg 9mg 10mg 11mg 10mg 9mg 12mg 13mg 14mg   INR 2.5 1.8 1.84 2.5 2.2 1.7 1.7 1.9 2.2   Notes   PAT  amio stop 12/14 1 missed dose boost       Date 1/24 1/31 2/7 2/14 2/21       Total Weekly Dose 14mg 14mg 14mg 14mg 14mg       INR 2.6 2.7 2.4 2.9 2.5       Notes              Phone Interview:  Tablet Strength: pt has 2mg tablets  Current Maintenance Dose: 2mg daily    Patient Findings      Negatives Signs/symptoms of thrombosis, Signs/symptoms of bleeding, Laboratory test error suspected, Change in health, Change in alcohol use, Change in activity, Upcoming invasive procedure, Emergency department visit, Upcoming dental procedure, Missed doses, Extra doses, Change in medications, Change in diet/appetite, Hospital admission, Bruising, Other complaints   Patient Contact Info: [phone] (Nazia Britton, daughter)  Lab Contact Info: [phone] mdINR    Plan:  1. INR is therapeutic today at 2.5. Instructed Mrs. Britton to have her mother continue warfarin 2mg daily.   2. Repeat INR in 1 week. Patient usually tests on Wednesday.   3. Verbal information provided over the phone to patient's daughter, Nazia Britton. She expresses understanding with teach  back and has no further questions at this time.  4. Patient's daughter declines the need for warfarin refills at this time.     Laura Alfonso, Pharmacy Technician  2/21/2018  8:11 AM     I, Lita Pascual, PharmD, have reviewed the note in full and agree with the assessment and plan.  02/21/18  9:47 AM

## 2018-02-21 NOTE — PROGRESS NOTES
Marilee Judge  6/3/1930  [phone]      02/21/2018    Lubna Robertson MD    Chief Complaint   Patient presents with   • Atrial Fibrillation   • Peripheral Vascular Disease   • Hypertension   • Shortness of Breath     Problem List  1. Atrial fibrillation/sick sinus syndrome:  a. 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  b. Status post dual-chamber Medtronic N Rhythm pacemaker, 09/08/2005 (implanted on the right side).  c. Rythmol therapy since, 09/08/2005.  d. Chronic anticoagulation with Coumadin with a CHADS score = 4.  e. Normal LV function and left atrial dimension by echocardiogram, July 2005.  f. Negative exercise Cardiolite, January 2008; negative adenosine Cardiolite, June 2005; negative stress echocardiogram, April 2004.  g. First-degree AV block.  h. Generator change, September 2011, in [place], [place].  i. ECV 9/27/2017: Post cardioversion the patient displayed a sinus rhythm.  j. Recurrent atrial fibrillation September 27, 2017. Propafenone discontinued.  Amiodarone initiated.  k. Cardioversion, 9/27/2017: Post cardioversion the patient displayed a sinus rhythm.  l. Cardioversion, 11/10/2017: Post cardioversion the patient displayed a sinus rhythm.  m. AV node ablation, 12/14/2017: Successful electrical recordings of the His bundle, right ventricle, and high right atrium. Successful radiofrequency ablation of the AV node.  2. Valvular Heart Disease  a. Echocardiogram 4/15/17:  Moderate to severe MR and TR.  No MVP.  Normal LV function.  b. KWADWO, 5/22/2017  c. MitraClip placed 9/12/17 (single)   d. Echo limited 9/13/2017: Trivial pericardial effusion. Normal LV systolic function. Single MitraClip seen in position across MV leaflets.    3. Hypertension.  4. Hyperlipidemia.  5. Dyspnea on exertion  a. Cardiac catheterization, 5/22/2017: Near normal coronary arteries. Successful PTCA and stent placement in the ostial right iliac using a 8 x 17 express LD biliary  stent.  6. Hypothyroidism, on chronic replacement therapy.  7. History of breast cancer, status post left mastectomy, 1989.  8. Osteoporosis.  9. Migraine headaches.  10. History of bleeding ulcer 15-20 years ago.  11. Gastroesophageal reflux disease.  12. Restless legs syndrome.  13. History of Helicobacter pylori, 2011.  14. Surgical history:  a. Hysterectomy, 1968.  b. Left mastectomy, 1989.  c. Colon resection for diverticulosis, 08/17/2013, Dr. Guadarrama at [facility].  d. Cholecystectomy.    Allergies   Allergen Reactions   • Doxycycline Hives     HIVES, RED FACE   • Fosamax [Alendronate] GI Intolerance   • Levofloxacin Hives     HIVES, RED FACE   • Metronidazole GI Intolerance       Current Medications:      Current Outpatient Prescriptions:   •  aspirin 81 MG tablet, Take 81 mg by mouth Daily., Disp: 30 tablet, Rfl: 11  •  bumetanide (BUMEX) 1 MG tablet, Take 1.5 tablets by mouth Daily. (Patient taking differently: Take 2 mg by mouth Daily.), Disp: 135 tablet, Rfl: 2  •  Calcium Carb-Cholecalciferol (CALCIUM 600 + D PO), Take 1 tablet by mouth 2 (Two) Times a Day., Disp: , Rfl:   •  cholecalciferol (VITAMIN D3) 1000 UNITS tablet, Take 1,000 Units by mouth Daily., Disp: , Rfl:   •  HYDROcodone-acetaminophen (NORCO) 5-325 MG per tablet, Take 1 tablet by mouth Every 8 (Eight) Hours As Needed for Moderate Pain ., Disp: , Rfl:   •  levothyroxine (LEVOXYL) 150 MCG tablet, Take 150 mcg by mouth Every Morning., Disp: , Rfl:   •  meloxicam (MOBIC) 7.5 MG tablet, Take 7.5 mg by mouth Daily., Disp: , Rfl:   •  metoprolol succinate XL (TOPROL-XL) 25 MG 24 hr tablet, Take 0.5 tablets by mouth Every Morning., Disp: 30 tablet, Rfl: 11  •  Multiple Vitamins-Minerals (CENTRUM ADULTS PO), Take 1 tablet by mouth Daily., Disp: , Rfl:   •  O2 (OXYGEN), Inhale 2 L/min As Needed., Disp: , Rfl:   •  omeprazole (priLOSEC) 40 MG capsule, Take 40 mg by mouth Every Morning., Disp: , Rfl:   •  pancrelipase, Lip-Prot-Amyl,  "(CREON) 62520 UNITS capsule delayed-release particles capsule, Take 24,000 units of lipase by mouth 3 (Three) Times a Day With Meals., Disp: , Rfl:   •  pravastatin (PRAVACHOL) 40 MG tablet, Take 40 mg by mouth Every Night., Disp: , Rfl:   •  rOPINIRole (REQUIP) 1 MG tablet, Take 1 mg by mouth 2 (Two) Times a Day. Taking 1mg up to 2-3 times per day (max 4mg per day), Disp: , Rfl:   •  vitamin B-12 (CYANOCOBALAMIN) 500 MCG tablet, Take 500 mcg by mouth Daily., Disp: , Rfl:   •  warfarin (COUMADIN) 1 MG tablet, Take  by mouth Take As Directed. 2 mg mon wed fri with 1 mg sun tue thurs and sat, Disp: , Rfl:   •  warfarin (COUMADIN) 2 MG tablet, 1 daily as directed (Patient taking differently: 1 daily as directed, MWF 2mg  Daily and Su Tu Th Sa, 1mg Daily.), Disp: 30 tablet, Rfl: 11    HPI    Marilee Judge presents today for 2 month hospital follow up. Patient has a history of atrial fibrillation, valvular heart disease, hypertension, and dyslipidemia. Since last visit, patient has been feeling well overall from a cardiovascular standpoint. She notes that she had a rash arise after her pedal edema resolved. She has also been struggling with a spot on her big toe that is reluctant to heal. It is not draining any. She states that she experiences leg pain all throughout the day. She is not able to elevate her legs for an extended amount of time due to pain in her calves. Patient has been told that she has a \"kidney problem\". Patient denies chest pain, palpitations, shortness of breath, edema, PND, orthopnea, dizziness, and syncope. She has lost 18 pounds of water weight since her first visit.    The following portions of the patient's history were reviewed and updated as appropriate: allergies, current medications and problem list.    Pertinent positives as listed in the HPI.  All other systems reviewed are negative.    Vitals:    02/21/18 1325   BP: 150/72   BP Location: Right arm   Patient Position: Sitting   Pulse: 77 " "  SpO2: 94%   Weight: 49.9 kg (110 lb)   Height: 167.6 cm (66\")       Physical Exam:  General: Alert and oriented to person, place, and time.  Neck: Jugular venous pressure is within normal limits. Carotids have normal upstrokes without bruits.   Cardiovascular: Regular rate and rhythm without murmur gallop or rub.  Lungs: Clear without rales or wheezes. Equal expansion is noted.   Extremities: Show 1-2+ edema.  Skin: warm and dry.  Neurologic: nonfocal    Diagnostic Data:    Lab Results   Component Value Date    INR 2.50 02/21/2018    INR 2.90 02/14/2018    INR 2.40 02/07/2018    PROTIME 20.4 (H) 12/11/2017    PROTIME 24.6 (H) 11/10/2017    PROTIME 45.6 (H) 10/18/2017     Lab Results   Component Value Date    GLUCOSE 72 12/11/2017    CALCIUM 8.9 12/11/2017     12/11/2017    K 4.0 12/11/2017    CO2 36.0 (H) 12/11/2017     12/11/2017    BUN 33 (H) 12/11/2017    CREATININE 0.90 12/11/2017    EGFRIFNONA 59 (L) 12/11/2017    BCR 36.7 (H) 12/11/2017    ANIONGAP 5.0 12/11/2017     Lab Results   Component Value Date    WBC 7.90 12/11/2017    HGB 9.8 (L) 12/11/2017    HCT 34.1 (L) 12/11/2017    MCV 95.5 12/11/2017     12/11/2017     Lab Results   Component Value Date    HGBA1C 5.70 (H) 12/11/2017     Procedures     DEVICE INTERROGATION:  2/21/2018, MDT ADDR01 PPM:  100%. R wave is 15.7 mV with a threshold of 1.4 V at 0.4 msec and an impedance of 779 ohms. Battery voltage is 3.5 years. No events.        Assessment:      ICD-10-CM ICD-9-CM   1. PVD (peripheral vascular disease) I73.9 443.9   2. High risk medication use Z79.899 V58.69   3. Permanent atrial fibrillation I48.2 427.31   4. Acute on chronic diastolic heart failure I50.33 428.33       Plan:    1. OMARI exam.CMP today.  Consider increasing Bumex.  2. Depending on OMARI results we may consider a dermatology evaluation for her lower extremity rash.  This may just be related to venous insufficiency.  3. Start cymbalta 60 mg daily  4. Continue current " medications.  5. F/up in 3 months or sooner if needed.    Scribed for Natasha Hanna MD by Adan Miles. 2/21/2018  1:53 PM    I Natasha Hanna MD personally performed the services described in this documentation as scribed by the above individual in my presence, and it is both accurate and complete.    Natasha Hanna MD, FACC

## 2018-02-26 NOTE — PROGRESS NOTES
Appt with [facility] 2/28/18 @1345- with [address]- [address]- Nazia aware of appt time and to arrive at 1300 for paperwork

## 2018-02-27 NOTE — TELEPHONE ENCOUNTER
PT's daughter notified me that she can not tolerate Cymbalta- she had horrible dreams and hallucinations. Instructed her to stop and she should f/u with her PCP- she verbalized understanding - Dr. Hanna made aware

## 2018-02-28 NOTE — PROGRESS NOTES
Anticoagulation Clinic - Remote Progress Note  HOME MONITOR (mdINR)  Frequency of Monitoring: weekly, every Wednesday    Indication: afib  Referring Provider: Cyndi  Initial Warfarin Start Date: 15 years aog  Goal INR: 2-3  Current Drug Interactions: aspirin, levothyroxine, ropinirole  CHADS-VASc: 5 (age, gender, HTN, CHF)    Diet: frequent, consistent  Alcohol: none  Tobacco: none  OTC Pain Medication: Excedrin (meloxicam)    1st clinic visit: 10/18/17    INR History:  Date  10/4 10/9 10/18 10/25 11/1 11/8 11/15 11/22 11/29 12/6 12/11 12/13   Total Weekly Dose 16.5 mg 14mg 14mg 14mg 11mg 9mg 10mg 10mg 9mg 9mg 9mg 10mg 11mg   INR  1.8 2.5 3.8 3.4 1.9 2.2 3.3 2.7 2.5 1.8 1.84 2.5   Notes pre- amio amio start 9/27 (BID)  clinic    Macrobid    PAT      Date 12/20 12/27 1/3/18 1/8 1/15 1/24 1/31 2/7 2/14 2/21 2/28    Total Weekly Dose 10mg 9mg 12mg 13mg 14mg 14mg 14mg 14mg 14mg 14mg 14mg    INR 2.2 1.7 1.7 1.9 2.2 2.6 2.7 2.4 2.9 2.5 1.9    Notes amio stop 12/14 1 missed dose boost              Phone Interview:  Tablet Strength: pt has 2mg tablets  Current Maintenance Dose: 2mg daily  Patient Contact Info: [phone] (Nazia Britton, daughter)  Lab Contact Info: [phone] mdINR  Patient Findings      Positives Change in diet/appetite     Negatives Signs/symptoms of thrombosis, Signs/symptoms of bleeding, Laboratory test error suspected, Change in health, Change in alcohol use, Change in activity, Upcoming invasive procedure, Emergency department visit, Upcoming dental procedure, Missed doses, Extra doses, Change in medications, Hospital admission, Bruising, Other complaints     Comments Has been sick the past few days and hasn't been eating nearly as much as she usually does     Plan:  1. INR is slightly SUBtherapeutic today, but given recent stability on current dose, instructed Mrs. Britton to have her mother continue warfarin 2mg daily.   2. Repeat INR in 1 week.  3. Verbal information provided over the  phone to patient's daughter, Nazia Britton. She expresses understanding with teach back and has no further questions at this time.  4. Patient's daughter declines the need for warfarin refills at this time.     Dean Salcedo, Savita  2/28/2018  9:57 AM    I, Gisela Thornton, [facility], have reviewed the note in full and agree with the assessment and plan.  02/28/18  10:36 AM

## 2018-03-07 NOTE — PROGRESS NOTES
Anticoagulation Clinic - Remote Progress Note  HOME MONITOR (mdINR)  Frequency of Monitoring: weekly, every Wednesday    Indication: afib  Referring Provider: Cyndi  Initial Warfarin Start Date: 15 years aog  Goal INR: 2-3  Current Drug Interactions: aspirin, levothyroxine, ropinirole  CHADS-VASc: 5 (age, gender, HTN, CHF)    Diet: frequent, consistent  Alcohol: none  Tobacco: none  OTC Pain Medication: Excedrin (meloxicam)    1st clinic visit: 10/18/17    INR History:  Date  10/4 10/9 10/18 10/25 11/1 11/8 11/15 11/22 11/29 12/6 12/11 12/13   Total Weekly Dose 16.5 mg 14mg 14mg 14mg 11mg 9mg 10mg 10mg 9mg 9mg 9mg 10mg 11mg   INR  1.8 2.5 3.8 3.4 1.9 2.2 3.3 2.7 2.5 1.8 1.84 2.5   Notes pre- amio amio start 9/27 (BID)  clinic    Macrobid    PAT      Date 12/20 12/27 1/3/18 1/8 1/15 1/24 1/31 2/7 2/14 2/21 2/28 3/7   Total Weekly Dose 10mg 9mg 12mg 13mg 14mg 14mg 14mg 14mg 14mg 14mg 14mg 14mg   INR 2.2 1.7 1.7 1.9 2.2 2.6 2.7 2.4 2.9 2.5 1.9 2.0   Notes amio stop 12/14 1 missed dose boost              Phone Interview:  Tablet Strength: pt has 2mg tablets  Current Maintenance Dose: 2mg daily  Patient Contact Info: [phone] (Nazia Britton, daughter)  Lab Contact Info: [phone] mdINR  Patient Findings      Positives Change in medications     Negatives Signs/symptoms of thrombosis, Signs/symptoms of bleeding, Laboratory test error suspected, Change in health, Change in alcohol use, Change in activity, Upcoming invasive procedure, Emergency department visit, Upcoming dental procedure, Missed doses, Extra doses, Change in diet/appetite, Hospital admission, Bruising, Other complaints     Comments Patient's daughter reports that patient is using a steroid ointment for sores on her feet.      Plan:  1. INR is back WNL today at 2.0, so instructed Mrs. Britton to have her mother continue warfarin 2mg daily.   2. Repeat INR in 1 week. If INR remains subtherapeutic / low-normal, consider increasing to 15mg  weekly dose at that time.  3. Verbal information provided over the phone to patient's daughter, Nazia Britton. She expresses understanding with teach back and has no further questions at this time.  4. Patient's daughter declines the need for warfarin refills at this time.     Laura Alfonso CPhT  3/7/2018  9:16 AM    IGisela [facility], have reviewed the note in full and agree with the assessment and plan.  03/07/18  11:49 AM

## 2018-03-14 NOTE — PROGRESS NOTES
Anticoagulation Clinic - Remote Progress Note  HOME MONITOR (mdINR)  Frequency of Monitoring: weekly, every Wednesday    Indication: afib  Referring Provider: Cyndi  Initial Warfarin Start Date: 15 years ago  Goal INR: 2-3  Current Drug Interactions: aspirin, levothyroxine, ropinirole  CHADS-VASc: 5 (age, gender, HTN, CHF)    Diet: frequent, consistent  Alcohol: none  Tobacco: none  OTC Pain Medication: Excedrin (meloxicam)    1st clinic visit: 10/18/17    INR History:  Date  10/4 10/9 10/18 10/25 11/1 11/8 11/15 11/22 11/29 12/6 12/11 12/13   Total Weekly Dose 16.5 mg 14mg 14mg 14mg 11mg 9mg 10mg 10mg 9mg 9mg 9mg 10mg 11mg   INR  1.8 2.5 3.8 3.4 1.9 2.2 3.3 2.7 2.5 1.8 1.84 2.5   Notes pre- amio amio start 9/27 (BID)  clinic    Macrobid    PAT      Date 12/20 12/27 1/3/18 1/8 1/15 1/24 1/31 2/7 2/14 2/21 2/28 3/7   Total Weekly Dose 10mg 9mg 12mg 13mg 14mg 14mg 14mg 14mg 14mg 14mg 14mg 14mg   INR 2.2 1.7 1.7 1.9 2.2 2.6 2.7 2.4 2.9 2.5 1.9 2.0   Notes amio stop 12/14 1 missed dose boost              Date 3/14               Total Weekly Dose 14 mg               INR 3.4               Notes recom LGV                   Phone Interview:  Tablet Strength: pt has 2mg tablets  Current Maintenance Dose: 2mg daily  Patient Contact Info: [phone] (Nazia Britton, daughter)  Lab Contact Info: [phone] mdINR    Patient Findings     Positives Change in medications   Negatives Signs/symptoms of thrombosis, Signs/symptoms of bleeding, Laboratory test error suspected, Change in health, Change in alcohol use, Change in activity, Upcoming invasive procedure, Emergency department visit, Upcoming dental procedure, Missed doses, Extra doses, Change in diet/appetite, Hospital admission, Bruising, Other complaints   Comments Started the nortriptyline recently at bedtime.       Plan:  1. INR is supratherapeutic today at 3.4, so instructed Mrs. Britton to have her mother eat about 1/2 cup or less of LGV (she has both  mustard greens and spinach available) and continue warfarin 2mg daily.   2. Repeat INR in 1 week.   3. Verbal information provided over the phone to patient's daughter, Nazia Britton. She expresses understanding with teach back and has no further questions at this time.  4. Patient's daughter declines the need for warfarin refills at this time.     Dean Hummel [facility]  3/14/2018  11:15 AM

## 2018-03-21 NOTE — PROGRESS NOTES
Anticoagulation Clinic - Remote Progress Note  HOME MONITOR (mdINR)  Frequency of Monitoring: weekly, every Wednesday    Indication: afib  Referring Provider: Cyndi  Initial Warfarin Start Date: 15 years ago  Goal INR: 2-3  Current Drug Interactions: aspirin, levothyroxine, ropinirole  CHADS-VASc: 5 (age, gender, HTN, CHF)    Diet: frequent, consistent  Alcohol: none  Tobacco: none  OTC Pain Medication: Excedrin (meloxicam)    1st clinic visit: 10/18/17    INR History:  Date  10/4 10/9 10/18 10/25 11/1 11/8 11/15 11/22 11/29 12/6 12/11 12/13   Total Weekly Dose 16.5 mg 14mg 14mg 14mg 11mg 9mg 10mg 10mg 9mg 9mg 9mg 10mg 11mg   INR  1.8 2.5 3.8 3.4 1.9 2.2 3.3 2.7 2.5 1.8 1.84 2.5   Notes pre- amio amio start 9/27 (BID)  clinic    Macrobid    PAT      Date 12/20 12/27 1/3/18 1/8 1/15 1/24 1/31 2/7 2/14 2/21 2/28 3/7   Total Weekly Dose 10mg 9mg 12mg 13mg 14mg 14mg 14mg 14mg 14mg 14mg 14mg 14mg   INR 2.2 1.7 1.7 1.9 2.2 2.6 2.7 2.4 2.9 2.5 1.9 2.0   Notes amio stop 12/14 1 missed dose boost              Date 3/14 3/21              Total Weekly Dose 14 mg 14 mg              INR 3.4 3.3              Notes recom LGV decr dose                  Phone Interview:  Tablet Strength: pt has 2mg tablets  Current Maintenance Dose: 2mg daily  Patient Contact Info: [phone] (Nazia Britton, daughter)  Lab Contact Info: [phone] mdINR    Patient Findings     Negatives Signs/symptoms of thrombosis, Signs/symptoms of bleeding, Laboratory test error suspected, Change in health, Change in alcohol use, Change in activity, Upcoming invasive procedure, Emergency department visit, Upcoming dental procedure, Missed doses, Extra doses, Change in medications, Change in diet/appetite, Hospital admission, Bruising, Other complaints   Comments She continues with daily or every other day greens.       Plan:  1. INR is supratherapeutic today at 3.3 following 3.4 last Wednesday, so instructed Mrs. Britton to have her mother to take  only 1 mg today (7% decrease) and continue warfarin 2mg daily .   2. Repeat INR in 1 week. Consider 2 mg daily except 1 mg on 3/28 pending INR result at that time, already updated in tracker.  3. Verbal information provided over the phone to patient's daughter, Nazia Britton. She expresses understanding with teach back and has no further questions at this time.  4. Patient's daughter declines the need for warfarin refills at this time.     Dean Hummel RPH  3/21/2018  2:15 PM

## 2018-03-28 NOTE — PROGRESS NOTES
Anticoagulation Clinic - Remote Progress Note  HOME MONITOR (mdINR)  Frequency of Monitoring: weekly, every Wednesday    Indication: afib  Referring Provider: Cyndi  Initial Warfarin Start Date: 15 years ago  Goal INR: 2-3  Current Drug Interactions: aspirin, levothyroxine, ropinirole  CHADS-VASc: 5 (age, gender, HTN, CHF)    Diet: frequent, consistent  Alcohol: none  Tobacco: none  OTC Pain Medication: Excedrin (meloxicam)    1st clinic visit: 10/18/17    INR History:  Date  10/4 10/9 10/18 10/25 11/1 11/8 11/15 11/22 11/29 12/6 12/11 12/13   Total Weekly Dose 16.5 mg 14mg 14mg 14mg 11mg 9mg 10mg 10mg 9mg 9mg 9mg 10mg 11mg   INR  1.8 2.5 3.8 3.4 1.9 2.2 3.3 2.7 2.5 1.8 1.84 2.5   Notes pre- amio amio start 9/27 (BID)  clinic    Macrobid    PAT      Date 12/20 12/27 1/3/18 1/8 1/15 1/24 1/31 2/7 2/14 2/21 2/28 3/7   Total Weekly Dose 10mg 9mg 12mg 13mg 14mg 14mg 14mg 14mg 14mg 14mg 14mg 14mg   INR 2.2 1.7 1.7 1.9 2.2 2.6 2.7 2.4 2.9 2.5 1.9 2.0   Notes amio stop 12/14 1 missed dose boost              Date 3/14 3/21 3/28             Total Weekly Dose 14 mg 14 mg 13mg 13mg            INR 3.4 3.3 2.9             Notes recom LGV decr dose                Phone Interview:  Tablet Strength: pt has 2mg tablets  Current Maintenance Dose: 2mg daily  Patient Contact Info: [phone] (Nazia Britton, daughter)  Lab Contact Info: [phone] mdINR    Patient Findings:   Negatives Signs/symptoms of thrombosis, Signs/symptoms of bleeding, Laboratory test error suspected, Change in health, Change in alcohol use, Change in activity, Upcoming invasive procedure, Emergency department visit, Upcoming dental procedure, Missed doses, Extra doses, Change in medications, Change in diet/appetite, Hospital admission, Bruising, Other complaints     Plan:  1. INR is back WNL today at 2.9, so instructed Mrs. Judge's daughter to continue her on warfarin 2mg daily except 1mg on Wednesday.   2. Repeat INR in 1 week. Patient typically  tests every Wednesday.   3. Verbal information provided over the phone to patient's daughter, Nazia, who RBV dosing instructions, expresses understanding with teach back, and has no further questions at this time.     Laura Alfonso, Pharmacy Technician  3/28/2018  9:43 AM     I, Gisela Thornton [facility], have reviewed the note in full and agree with the assessment and plan.  03/30/18  8:00 AM

## 2018-04-11 NOTE — PROGRESS NOTES
Anticoagulation Clinic - Remote Progress Note  HOME MONITOR (mdINR)  Frequency of Monitoring: weekly, every Wednesday    Indication: afib  Referring Provider: Cyndi  Initial Warfarin Start Date: 15 years ago  Goal INR: 2-3  Current Drug Interactions: aspirin, levothyroxine, ropinirole  CHADS-VASc: 5 (age, gender, HTN, CHF)    Diet: frequent, consistent  Alcohol: none  Tobacco: none  OTC Pain Medication: Excedrin (meloxicam)    1st clinic visit: 10/18/17    INR History:  Date  10/4 10/9 10/18 10/25 11/1 11/8 11/15 11/22 11/29 12/6 12/11 12/13   Total Weekly Dose 16.5 mg 14mg 14mg 14mg 11mg 9mg 10mg 10mg 9mg 9mg 9mg 10mg 11mg   INR  1.8 2.5 3.8 3.4 1.9 2.2 3.3 2.7 2.5 1.8 1.84 2.5   Notes pre- amio amio start 9/27 (BID)  clinic    Macrobid    PAT      Date 12/20 12/27 1/3/18 1/8 1/15 1/24 1/31 2/7 2/14 2/21 2/28 3/7   Total Weekly Dose 10mg 9mg 12mg 13mg 14mg 14mg 14mg 14mg 14mg 14mg 14mg 14mg   INR 2.2 1.7 1.7 1.9 2.2 2.6 2.7 2.4 2.9 2.5 1.9 2.0   Notes amio stop 12/14 1 missed dose boost              Date 3/14 3/21 3/28 4/4 4/11           Total Weekly Dose 14 mg 14 mg 13mg 13mg 12mg           INR 3.4 3.3 2.9 3.8 1.2           Notes recom GLV decr dose   incr. GLV (must greens)             Phone Interview:  Tablet Strength: pt has 2mg tablets  Current Maintenance Dose: 2mg daily  Patient Contact Info: [phone] (Nazia Britton, daughter)  Lab Contact Info: [phone] mdINR    Patient Findings:  Positives Change in diet/appetite   Negatives Signs/symptoms of thrombosis, Signs/symptoms of bleeding, Laboratory test error suspected, Change in health, Change in alcohol use, Change in activity, Upcoming invasive procedure, Emergency department visit, Upcoming dental procedure, Missed doses, Extra doses, Change in medications, Hospital admission, Bruising, Other complaints   Comments Ms. Britton reports her mother increased her GLV intake significantly this past week -- she ate some cooked mustard greens,  cooked cabbage, and salads. She reports that her mother should probably avoid eating lettuce salads in the near future due to recent bowel issues, but she otherwise likes to eat green beans / peas. Discussed that different foods have varying amounts of vitamin K, and noted that mustard greens are especially high (>800mcg per cup). Compared to cooked cabbage (also high, but less significantly so at ~160mcg per cup) and lettuce (15-60 mcg per cup), mustard greens are much more likely to cause variation in INR, given their high vitamin K content. Ms. Britton has the resource of foods and vitamin K content available and accessible from her trip to the clinic last year, and she verbalizes understanding of this information. Suggested that her mother resume her prior diet, keeping with foods that are low to moderate in vitamin K (green beans / peas).     Ms. Britton otherwise reports that her [employer] machine somehow reverted to a different result display, showing a percentage, rather than an INR value. She called Dean, who walked her through resetting the machine. She reports she tested her INR (1.0) after this, and then re-stuck and tested her mother's INR again, finally getting a result of 1.2. Will look into transitioning her monitor from Josué to Alere, as she was unable to reach a  when needed this AM.      Plan:  1. INR is subtherapeutic today, likely a result of lowered dose + dietary changes this past week. For now, instructed Ms. Britton to have her mother take warfarin 2mg daily until recheck.  2. Repeat INR on Monday.  3. Verbal information provided over the phone to patient's daughter, Nazia, who RBV dosing instructions, expresses understanding with teach back, and has no further questions at this time.     Gisela Thornton RP  4/11/2018  8:26 AM

## 2018-04-16 NOTE — PROGRESS NOTES
Anticoagulation Clinic - Remote Progress Note  HOME MONITOR (mdINR)  Frequency of Monitoring: weekly, every Wednesday    Indication: afib  Referring Provider: Cyndi  Initial Warfarin Start Date: 15 years ago  Goal INR: 2-3  Current Drug Interactions: aspirin, levothyroxine, ropinirole  CHADS-VASc: 5 (age, gender, HTN, CHF)    Diet: frequent, consistent  Alcohol: none  Tobacco: none  OTC Pain Medication: Excedrin (meloxicam)    1st clinic visit: 10/18/17    INR History:  Date  10/4 10/9 10/18 10/25 11/1 11/8 11/15 11/22 11/29 12/6 12/11 12/13   Total Weekly Dose 16.5 mg 14mg 14mg 14mg 11mg 9mg 10mg 10mg 9mg 9mg 9mg 10mg 11mg   INR  1.8 2.5 3.8 3.4 1.9 2.2 3.3 2.7 2.5 1.8 1.84 2.5   Notes pre- amio amio start 9/27 (BID)  clinic    Macrobid    PAT      Date 12/20 12/27 1/3/18 1/8 1/15 1/24 1/31 2/7 2/14 2/21 2/28 3/7   Total Weekly Dose 10mg 9mg 12mg 13mg 14mg 14mg 14mg 14mg 14mg 14mg 14mg 14mg   INR 2.2 1.7 1.7 1.9 2.2 2.6 2.7 2.4 2.9 2.5 1.9 2.0   Notes amio stop 12/14 1 missed dose boost              Date 3/14 3/21 3/28 4/4 4/11 4/16          Total Weekly Dose 14 mg 14 mg 13mg 13mg 12mg 14mg          INR 3.4 3.3 2.9 3.8 1.2 1.3          Notes recom GLV decr dose   incr. GLV (must greens)  boost           Phone Interview:  Tablet Strength: pt has 2mg tablets  Current Maintenance Dose: 2mg daily  Patient Contact Info: [phone] (Nazia Britton, daughter)  Lab Contact Info: [phone] mdINR    Patient Findings   Positives Change in diet/appetite, Other complaints   Negatives Signs/symptoms of thrombosis, Signs/symptoms of bleeding, Laboratory test error suspected, Change in health, Change in alcohol use, Change in activity, Upcoming invasive procedure, Emergency department visit, Upcoming dental procedure, Missed doses, Extra doses, Change in medications, Hospital admission, Bruising   Comments Ms. Britton reports her mother increased her GLV intake significantly last week- (cooked mustard greens, cooked  cabbage, and salads). She is resuming her prior diet (peas and green beans), keeping with foods that are low to moderate in vitamin K (green beans / peas). Looking into transitioning her monitor from Josué to Khai, as she was unable to reach a  last week when needed. She reports that her mother has lost weight from 140 to 98 pounds. She plans to discuss with Dr. Hanna and has left a voicemail.      Plan:  1. INR is subtherapeutic today, likely due to dietary changes last week. Given history will continue to monitor closely as she has previously become supratherapeutic on 14mg/week. For now, instructed Ms. Britton to have her mother boost her dose tonight to 3mg then continue warfarin 2mg daily until recheck wednesday.  2. Repeat INR Wednesday 4/18.  3. Verbal information provided over the phone to patient's daughter, Nazia, who RBV dosing instructions, expresses understanding with teach back, and has no further questions at this time.     Lita Pascual, PharmD  4/16/2018  9:56 AM

## 2018-04-18 NOTE — PROGRESS NOTES
Anticoagulation Clinic - Remote Progress Note  HOME MONITOR (mdINR)  Frequency of Monitoring: weekly, every Wednesday    Indication: afib  Referring Provider: Cyndi  Initial Warfarin Start Date: 15 years ago  Goal INR: 2-3  Current Drug Interactions: aspirin, levothyroxine, ropinirole  CHADS-VASc: 5 (age, gender, HTN, CHF)    Diet: frequent, consistent  Alcohol: none  Tobacco: none  OTC Pain Medication: Excedrin (meloxicam)    1st clinic visit: 10/18/17    INR History:  Date  10/4 10/9 10/18 10/25 11/1 11/8 11/15 11/22 11/29 12/6 12/11 12/13   Total Weekly Dose 16.5 mg 14mg 14mg 14mg 11mg 9mg 10mg 10mg 9mg 9mg 9mg 10mg 11mg   INR  1.8 2.5 3.8 3.4 1.9 2.2 3.3 2.7 2.5 1.8 1.84 2.5   Notes pre- amio amio start 9/27 (BID)  clinic    Macrobid    PAT      Date 12/20 12/27 1/3/18 1/8 1/15 1/24 1/31 2/7 2/14 2/21 2/28 3/7   Total Weekly Dose 10mg 9mg 12mg 13mg 14mg 14mg 14mg 14mg 14mg 14mg 14mg 14mg   INR 2.2 1.7 1.7 1.9 2.2 2.6 2.7 2.4 2.9 2.5 1.9 2.0   Notes amio stop 12/14 1 missed dose boost              Date 3/14 3/21 3/28 4/4 4/11 4/16 4/18         Total Weekly Dose 14 mg 14 mg 13mg 13mg 12mg 14mg 15mg         INR 3.4 3.3 2.9 3.8 1.2 1.3 1.5         Notes recom GLV decr dose   incr. GLV (juan carlos greens)  boost           Phone Interview:  Tablet Strength: pt has 2mg tablets  Current Maintenance Dose: 2mg daily  Patient Contact Info: [phone] (Nazia Britton, daughter)  Lab Contact Info: [phone] mdINR    Patient Findings     Negatives Signs/symptoms of thrombosis, Signs/symptoms of bleeding, Laboratory test error suspected, Change in health, Change in alcohol use, Change in activity, Upcoming invasive procedure, Emergency department visit, Upcoming dental procedure, Missed doses, Extra doses, Change in medications, Change in diet/appetite, Hospital admission, Bruising, Other complaints   Comments Daughter concerned that she has lost so much weight that she only weighs 98 lbs.  She is planning to follow up  with her family doctor.   Otherwise, no changes reported.     Plan:  1. INR is subtherapeutic today, despite boost dose Monday.  Given history will continue to monitor closely as she has previously become supratherapeutic on 14mg/week. For now, instructed Ms. Britton to have her mother boost her dose tonight to 2 mg then continue warfarin 2mg daily except 1 mg oral on Saturday until recheck Monday.  Encouraged patient to continue consistent intake of GLV.  Her daughter stated that she is eating well.  2. Repeat INR Monday 4/23.  3. Verbal information provided over the phone to patient's daughter, Nazia, who RBV dosing instructions, expresses understanding with teach back, and has no further questions at this time.     Arabella Brice, PharmD  4/18/2018  1:31 PM

## 2018-04-23 NOTE — PROGRESS NOTES
Anticoagulation Clinic - Remote Progress Note  HOME MONITOR (mdINR)  Frequency of Monitoring: weekly, every Wednesday    Indication: afib  Referring Provider: Cyndi  Initial Warfarin Start Date: 15 years ago  Goal INR: 2-3  Current Drug Interactions: aspirin, levothyroxine, ropinirole  CHADS-VASc: 5 (age, gender, HTN, CHF)    Diet: frequent, consistent  Alcohol: none  Tobacco: none  OTC Pain Medication: Excedrin (meloxicam)    1st clinic visit: 10/18/17    INR History:  Date  10/4 10/9 10/18 10/25 11/1 11/8 11/15 11/22 11/29 12/6 12/11 12/13   Total Weekly Dose 16.5 mg 14mg 14mg 14mg 11mg 9mg 10mg 10mg 9mg 9mg 9mg 10mg 11mg   INR  1.8 2.5 3.8 3.4 1.9 2.2 3.3 2.7 2.5 1.8 1.84 2.5   Notes pre- amio amio start 9/27 (BID)  clinic    Macrobid    PAT      Date 12/20 12/27 1/3/18 1/8 1/15 1/24 1/31 2/7 2/14 2/21 2/28 3/7   Total Weekly Dose 10mg 9mg 12mg 13mg 14mg 14mg 14mg 14mg 14mg 14mg 14mg 14mg   INR 2.2 1.7 1.7 1.9 2.2 2.6 2.7 2.4 2.9 2.5 1.9 2.0   Notes amio stop 12/14 1 missed dose boost              Date 3/14 3/21 3/28 4/4 4/11 4/16 4/18 4/23        Total Weekly Dose 14 mg 14 mg 13mg 13mg 12mg 14mg 15mg 14 mg        INR 3.4 3.3 2.9 3.8 1.2 1.3 1.5 1.3        Notes recom GLV decr dose   incr. GLV (must greens)  boost           Phone Interview:  Tablet Strength: pt has 2mg tablets  Current Maintenance Dose: 2mg daily  Patient Contact Info: [phone] (Nazia Britton, daughter)    Patient Findings:  Negatives Signs/symptoms of thrombosis, Signs/symptoms of bleeding, Laboratory test error suspected, Change in health, Change in alcohol use, Change in activity, Upcoming invasive procedure, Emergency department visit, Upcoming dental procedure, Missed doses, Extra doses, Change in medications, Change in diet/appetite, Hospital admission, Bruising, Other complaints   Comments Ms. Britton denies changes in her mother's medications or diet. She has used a topical steroid cream recently (triamcinolone, one  application once weekly), but nothing oral.      Plan:  1. INR is subtherapeutic again today. Mrs. Judge took an average weekly warfarin dose of 16mg prior to amio initiation last year -- requirements may be increasing back toward this. For now, instructed Ms. Britton to have her mother take warfarin 3mg daily until recheck.  2. Repeat INR on Wednesday.  3. Verbal information provided over the phone to patient's daughter, Nazia. She RBV dosing instructions, expresses understanding with teach back, and has no further questions at this time.     Ann Cowden Mayer, PharmD  4/23/2018  11:22 AM

## 2018-04-25 NOTE — PROGRESS NOTES
Anticoagulation Clinic - Remote Progress Note  HOME MONITOR (mdINR)  Frequency of Monitoring: weekly, every Wednesday    Indication: afib  Referring Provider: Cyndi  Initial Warfarin Start Date: 15 years ago  Goal INR: 2-3  Current Drug Interactions: aspirin, levothyroxine, ropinirole  CHADS-VASc: 5 (age, gender, HTN, CHF)    Diet: frequent, consistent  Alcohol: none  Tobacco: none  OTC Pain Medication: Excedrin (meloxicam)    1st clinic visit: 10/18/17    INR History:  Date  10/4 10/9 10/18 10/25 11/1 11/8 11/15 11/22 11/29 12/6 12/11 12/13   Total Weekly Dose 16.5 mg 14mg 14mg 14mg 11mg 9mg 10mg 10mg 9mg 9mg 9mg 10mg 11mg   INR  1.8 2.5 3.8 3.4 1.9 2.2 3.3 2.7 2.5 1.8 1.84 2.5   Notes pre- amio amio start 9/27 (BID)  clinic    Macrobid    PAT      Date 12/20 12/27 1/3/18 1/8 1/15 1/24 1/31 2/7 2/14 2/21 2/28 3/7   Total Weekly Dose 10mg 9mg 12mg 13mg 14mg 14mg 14mg 14mg 14mg 14mg 14mg 14mg   INR 2.2 1.7 1.7 1.9 2.2 2.6 2.7 2.4 2.9 2.5 1.9 2.0   Notes amio stop 12/14 1 missed dose boost              Date 3/14 3/21 3/28 4/4 4/11 4/16 4/18 4/23 4/25 5/1      Total Weekly Dose 14 mg 14 mg 13mg 13mg 12mg 14mg 15mg 14 mg 15 mg 17 mg      INR 3.4 3.3 2.9 3.8 1.2 1.3 1.5 1.3 1.4       Notes recom GLV decr dose   incr. GLV (must greens)  boost  boost         Phone Interview:  Tablet Strength: pt has 2mg tablets  Current Maintenance Dose: 2mg daily  Patient Contact Info: [phone] (Nazia Britton, daughter)    Patient Findings:  Negatives Signs/symptoms of thrombosis, Signs/symptoms of bleeding, Laboratory test error suspected, Change in health, Change in alcohol use, Change in activity, Upcoming invasive procedure, Emergency department visit, Upcoming dental procedure, Missed doses, Extra doses, Change in medications, Change in diet/appetite, Hospital admission, Bruising, Other complaints   Comments Ms. Britton denies changes in her mother's medications or diet. She has used a topical steroid cream recently  (triamcinolone, one application once weekly), but nothing oral.      Plan:  1. INR is SUB therapeutic again today. Mrs. Judge took an average weekly warfarin dose of 16mg prior to amio initiation last year -- requirements may be increasing back toward this. For now, instructed Ms. Britton to have her mother take warfarin 3mg tonight and then warfarin 2 mg oral daily until recheck.  She had already been boosted on Monday and Tuesday of this week.  2. Repeat INR on Monday 5/1 to verify INR within therapeutic range.  3. Verbal information provided over the phone to patient's daughter, Nazia. She RBV dosing instructions, expresses understanding with teach back, and has no further questions at this time.     Arabella Brice, PharmD  4/25/2018  9:26 AM

## 2018-04-30 NOTE — PROGRESS NOTES
Anticoagulation Clinic - Remote Progress Note  HOME MONITOR (mdINR)  Frequency of Monitoring: weekly, every Wednesday    Indication: afib  Referring Provider: Cyndi  Initial Warfarin Start Date: 15 years ago  Goal INR: 2-3  Current Drug Interactions: aspirin, levothyroxine, ropinirole  CHADS-VASc: 5 (age, gender, HTN, CHF)    Diet: frequent, consistent  Alcohol: none  Tobacco: none  OTC Pain Medication: Excedrin (meloxicam)    1st clinic visit: 10/18/17    INR History:  Date  10/4 10/9 10/18 10/25 11/1 11/8 11/15 11/22 11/29 12/6 12/11 12/13   Total Weekly Dose 16.5 mg 14mg 14mg 14mg 11mg 9mg 10mg 10mg 9mg 9mg 9mg 10mg 11mg   INR  1.8 2.5 3.8 3.4 1.9 2.2 3.3 2.7 2.5 1.8 1.84 2.5   Notes pre- amio amio start 9/27 (BID)  clinic    Macrobid    PAT      Date 12/20 12/27 1/3/18 1/8 1/15 1/24 1/31 2/7 2/14 2/21 2/28 3/7   Total Weekly Dose 10mg 9mg 12mg 13mg 14mg 14mg 14mg 14mg 14mg 14mg 14mg 14mg   INR 2.2 1.7 1.7 1.9 2.2 2.6 2.7 2.4 2.9 2.5 1.9 2.0   Notes amio stop 12/14 1 missed dose boost              Date 3/14 3/21 3/28 4/4 4/11 4/16 4/18 4/23 4/25 5/1      Total Weekly Dose 14 mg 14 mg 13mg 13mg 12mg 14mg 15mg 14 mg 15 mg 17 mg      INR 3.4 3.3 2.9 3.8 1.2 1.3 1.5 1.3 1.4 1.8      Notes recom GLV decr dose   incr. GLV (must greens)  boost  boost         Phone Interview:  Tablet Strength: pt has 2mg tablets  Current Maintenance Dose: 2mg daily  Patient Contact Info: [phone] (Nazia Britton, daughter)    Patient Findings:  Negatives Signs/symptoms of thrombosis, Signs/symptoms of bleeding, Laboratory test error suspected, Change in health, Change in alcohol use, Change in activity, Upcoming invasive procedure, Emergency department visit, Upcoming dental procedure, Missed doses, Extra doses, Change in medications, Change in diet/appetite, Hospital admission, Bruising, Other complaints   Comments Pulled from previous note: Ms. Britton denies changes in her mother's medications or diet. She has used a  topical steroid cream recently (triamcinolone, one application once weekly), but nothing oral.      Plan:  1. INR is SUB therapeutic again today.  For now, instructed Ms. Britton BOOST dose to 3mg then will tentatively increase dose to 2mg daily except 3mg TueThuSun.  2. Repeat INR on Friday 5/4.  3. Verbal information provided over the phone to patient's daughter, Nazia. She RBV dosing instructions, expresses understanding with teach back, and has no further questions at this time.     Lita Pascual, PharmD  4/30/2018  10:55 AM

## 2018-05-04 NOTE — PROGRESS NOTES
Anticoagulation Clinic - Remote Progress Note  HOME MONITOR (mdINR)  Frequency of Monitoring: weekly, every Wednesday    Indication: afib  Referring Provider: Cyndi  Initial Warfarin Start Date: 15 years ago  Goal INR: 2-3  Current Drug Interactions: aspirin, levothyroxine, ropinirole  CHADS-VASc: 5 (age, gender, HTN, CHF)    Diet: frequent, consistent  Alcohol: none  Tobacco: none  OTC Pain Medication: Excedrin (meloxicam)    1st clinic visit: 10/18/17    INR History:  Date  10/4 10/9 10/18 10/25 11/1 11/8 11/15 11/22 11/29 12/6 12/11 12/13   Total Weekly Dose 16.5 mg 14mg 14mg 14mg 11mg 9mg 10mg 10mg 9mg 9mg 9mg 10mg 11mg   INR  1.8 2.5 3.8 3.4 1.9 2.2 3.3 2.7 2.5 1.8 1.84 2.5   Notes pre- amio amio start 9/27 (BID)  clinic    Macrobid    PAT      Date 12/20 12/27 1/3/18 1/8 1/15 1/24 1/31 2/7 2/14 2/21 2/28 3/7   Total Weekly Dose 10mg 9mg 12mg 13mg 14mg 14mg 14mg 14mg 14mg 14mg 14mg 14mg   INR 2.2 1.7 1.7 1.9 2.2 2.6 2.7 2.4 2.9 2.5 1.9 2.0   Notes amio stop 12/14 1 missed dose boost              Date 3/14 3/21 3/28 4/4 4/11 4/16 4/18 4/23 4/25 5/1 5/4    Total Weekly Dose 14 mg 14 mg 13mg 13mg 12mg 14mg 15mg 14 mg 15 mg 17 mg 17mg    INR 3.4 3.3 2.9 3.8 1.2 1.3 1.5 1.3 1.4 1.8 2.7    Notes recom GLV decr dose   incr. GLV (must greens)  boost  boost        Phone Interview:  Tablet Strength: pt has 2mg tablets  Current Maintenance Dose: 2mg daily  Patient Contact Info: [phone] (Nazia Britton, daughter)    Patient Findings:  Negatives Signs/symptoms of thrombosis, Signs/symptoms of bleeding, Laboratory test error suspected, Change in health, Change in alcohol use, Change in activity, Upcoming invasive procedure, Emergency department visit, Upcoming dental procedure, Missed doses, Extra doses, Change in medications, Change in diet/appetite, Hospital admission, Bruising, Other complaints   Comments Spoke with patient daughter, Nazia, who states no changes have occurred since last encounter.      Plan:  1. INR is therapeutic today at 2.7.  For now, instructed Ms. Britton (the patient's daughter) to continue warfarin 2mg daily except 3mg on Tuesday and Thursday.   2. Repeat INR on Thursday 5/10.  3. Verbal information provided over the phone to patient's daughter, Nazia. She RBV dosing instructions, expresses understanding with teach back, and has no further questions at this time.   4. Nazia denies need for warfarin refills at this time.     Leigha Cortez, Pharmacy Technician  5/4/2018  9:03 AM     IMadhuri, [facility], have reviewed the note in full and agree with the assessment and plan.  05/04/18  2:55 PM

## 2018-05-10 NOTE — PROGRESS NOTES
Anticoagulation Clinic - Remote Progress Note  HOME MONITOR (mdINR)  Frequency of Monitoring: weekly, every Wednesday    Indication: afib  Referring Provider: Cyndi  Initial Warfarin Start Date: 15 years ago  Goal INR: 2-3  Current Drug Interactions: aspirin, levothyroxine, ropinirole  CHADS-VASc: 5 (age, gender, HTN, CHF)    Diet: frequent, consistent  Alcohol: none  Tobacco: none  OTC Pain Medication: Excedrin (meloxicam)    1st clinic visit: 10/18/17    INR History:  Date  10/4 10/9 10/18 10/25 11/1 11/8 11/15 11/22 11/29 12/6 12/11 12/13   Total Weekly Dose 16.5 mg 14mg 14mg 14mg 11mg 9mg 10mg 10mg 9mg 9mg 9mg 10mg 11mg   INR  1.8 2.5 3.8 3.4 1.9 2.2 3.3 2.7 2.5 1.8 1.84 2.5   Notes pre- amio amio start 9/27 (BID)  clinic    Macrobid    PAT      Date 12/20 12/27 1/3/18 1/8 1/15 1/24 1/31 2/7 2/14 2/21 2/28 3/7   Total Weekly Dose 10mg 9mg 12mg 13mg 14mg 14mg 14mg 14mg 14mg 14mg 14mg 14mg   INR 2.2 1.7 1.7 1.9 2.2 2.6 2.7 2.4 2.9 2.5 1.9 2.0   Notes amio stop 12/14 1 missed dose boost              Date 3/14 3/21 3/28 4/4 4/11 4/16 4/18 4/23 4/25 5/1 5/4 5/10   Total Weekly Dose 14 mg 14 mg 13mg 13mg 12mg 14mg 15mg 14 mg 15 mg 17 mg 17mg 16mg   INR 3.4 3.3 2.9 3.8 1.2 1.3 1.5 1.3 1.4 1.8 2.7 2.4   Notes recom GLV decr dose   incr. GLV (must greens)  boost  boost        Phone Interview:  Tablet Strength: pt has 2mg tablets  Current Maintenance Dose: 2mg daily  Patient Contact Info: [phone] (Nazia Britton, daughter)    Patient Findings:    Negatives Signs/symptoms of thrombosis, Signs/symptoms of bleeding, Laboratory test error suspected, Change in health, Change in alcohol use, Change in activity, Upcoming invasive procedure, Emergency department visit, Upcoming dental procedure, Missed doses, Extra doses, Change in medications, Change in diet/appetite, Hospital admission, Bruising, Other complaints     Plan:  1. INR is therapeutic today at 2.4.  For now, instructed Ms. Britton (the patient's  daughter) to continue warfarin 2mg daily except 3mg on Tuesday and Thursday.   2. Repeat INR on Thursday 5/17  3. Verbal information provided over the phone to patient's daughter, Nazia. She RBV dosing instructions, expresses understanding with teach back, and has no further questions at this time.   4. Nazia denies need for warfarin refills at this time.     Dean [facility]mel [facility]  5/10/2018  10:05 AM

## 2018-05-17 NOTE — PROGRESS NOTES
Anticoagulation Clinic - Remote Progress Note  HOME MONITOR (mdINR)  Frequency of Monitoring: weekly, every Wednesday    Indication: afib  Referring Provider: Cyndi  Initial Warfarin Start Date: 15 years ago  Goal INR: 2-3  Current Drug Interactions: aspirin, levothyroxine, ropinirole  CHADS-VASc: 5 (age, gender, HTN, CHF)    Diet: frequent, consistent  Alcohol: none  Tobacco: none  OTC Pain Medication: Excedrin (meloxicam)    1st clinic visit: 10/18/17    INR History:  Date  10/4 10/9 10/18 10/25 11/1 11/8 11/15 11/22 11/29 12/6 12/11 12/13   Total Weekly Dose 16.5 mg 14mg 14mg 14mg 11mg 9mg 10mg 10mg 9mg 9mg 9mg 10mg 11mg   INR  1.8 2.5 3.8 3.4 1.9 2.2 3.3 2.7 2.5 1.8 1.84 2.5   Notes pre- amio amio start 9/27 (BID)  clinic    Macrobid    PAT      Date 12/20 12/27 1/3/18 1/8 1/15 1/24 1/31 2/7 2/14 2/21 2/28 3/7   Total Weekly Dose 10mg 9mg 12mg 13mg 14mg 14mg 14mg 14mg 14mg 14mg 14mg 14mg   INR 2.2 1.7 1.7 1.9 2.2 2.6 2.7 2.4 2.9 2.5 1.9 2.0   Notes amio stop 12/14 1 missed dose boost              Date 3/14 3/21 3/28 4/4 4/11 4/16 4/18 4/23 4/25 5/1 5/4 5/10 5/17   Total Weekly Dose 14 mg 14 mg 13mg 13mg 12mg 14mg 15mg 14 mg 15mg 17mg 17mg 16mg 16mg   INR 3.4 3.3 2.9 3.8 1.2 1.3 1.5 1.3 1.4 1.8 2.7 2.4 2.4   Notes recom GLV decr dose   incr. GLV (must greens)  boost  boost         Phone Interview:  Tablet Strength: 2mg tablets  Current Maintenance Dose: 2mg every day except 3mg on [place]  Patient Contact Info: [phone] (Nazia Britton, daughter)    Patient Findings   Negatives:   Signs/symptoms of thrombosis, Signs/symptoms of bleeding, Laboratory test error suspected, Change in health, Change in alcohol use, Change in activity, Upcoming invasive procedure, Emergency department visit, Upcoming dental procedure, Missed doses, Extra doses, Change in medications, Change in diet/appetite, Hospital admission, Bruising, Other complaints     Plan:  1. INR is therapeutic again today at 2.4.  For now,  instructed patient's daughter, Nazia, to continue maintenance dose of warfarin 2mg every day except 3mg on TuesThurs   2. Repeat INR in 1 week, 5/24. Will come into clinic after appt w/Dr Hanna.  3. Verbal information provided over the phone to patient's daughter, Nazia. She RBV dosing instructions, expresses understanding with teach back, and has no further questions at this time.   4. aNzia denies need for warfarin refills at this time.     Dean Salcedo, Savita  5/17/2018  8:41 AM      I, Lita Pascual, PharmD, have reviewed the note in full and agree with the assessment and plan.  05/17/18  12:31 PM

## 2018-05-24 NOTE — PROGRESS NOTES
Marilee Judge  6/3/1930  [phone]      05/24/2018    Lubna Robertson MD    Chief Complaint   Patient presents with   • Atrial Fibrillation     Problem List  1. Atrial fibrillation/sick sinus syndrome:  a. 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  b. Status post dual-chamber Medtronic N Rhythm pacemaker, 09/08/2005 (implanted on the right side).  c. Rythmol therapy since, 09/08/2005.  d. Chronic anticoagulation with Coumadin with a CHADS score = 4.  e. Normal LV function and left atrial dimension by echocardiogram, July 2005.  f. Negative exercise Cardiolite, January 2008; negative adenosine Cardiolite, June 2005; negative stress echocardiogram, April 2004.  g. First-degree AV block.  h. Generator change, September 2011, in [place], [place].  i. ECV 9/27/2017: Post cardioversion the patient displayed a sinus rhythm.  j. Recurrent atrial fibrillation September 27, 2017. Propafenone discontinued.  Amiodarone initiated.  k. Cardioversion, 9/27/2017: Post cardioversion the patient displayed a sinus rhythm.  l. Cardioversion, 11/10/2017: Post cardioversion the patient displayed a sinus rhythm.  m. AV node ablation, 12/14/2017: Successful electrical recordings of the His bundle, right ventricle, and high right atrium. Successful radiofrequency ablation of the AV node.  2. Valvular Heart Disease  a. Echocardiogram 4/15/17:  Moderate to severe MR and TR.  No MVP. Normal LV function.  b. KWADWO, 5/22/2017: EF 60%. Moderate to sever MR. 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 Mild AR. Mild TR.  c. MitraClip placed 9/12/17 (single)   d. Echo limited 9/13/2017: Trivial pericardial effusion. Normal LV systolic function. Single MitraClip seen in position across MV leaflets.   3. Hypertension.  4. Hyperlipidemia.  5. Peripheral vascular disease  a. OMARI, 2/21/2018: The right OMARI is normal. Moderate digital ischemia. The left OMARI is normal. Moderate digital ischemia. Normal right  OMARI at 1.09, normal left OMARI at 1.05.  6. Dyspnea on exertion  a. Cardiac catheterization, 5/22/2017: Near normal coronary arteries. Successful PTCA and stent placement in the ostial right iliac using a 8 x 17 express LD biliary stent.  7. Hypothyroidism, on chronic replacement therapy.  8. History of breast cancer, status post left mastectomy, 1989.  9. Osteoporosis.  10. Migraine headaches.  11. History of bleeding ulcer 15-20 years ago.  12. Gastroesophageal reflux disease.  13. Restless legs syndrome.  14. History of Helicobacter pylori, 2011.  15. Surgical history:  a. Hysterectomy, 1968.  b. Left mastectomy, 1989.  c. Colon resection for diverticulosis, 08/17/2013, Dr. Guadarrama at [facility].  d. Cholecystectomy.    Allergies   Allergen Reactions   • Doxycycline Hives     HIVES, RED FACE   • Fosamax [Alendronate] GI Intolerance   • Levofloxacin Hives     HIVES, RED FACE   • Metronidazole GI Intolerance       Current Medications:      Current Outpatient Prescriptions:   •  aspirin 81 MG tablet, Take 81 mg by mouth Daily., Disp: 30 tablet, Rfl: 11  •  bumetanide (BUMEX) 1 MG tablet, 3 tabs po daily, Disp: 270 tablet, Rfl: 1  •  Calcium Carb-Cholecalciferol (CALCIUM 600 + D PO), Take 1 tablet by mouth 2 (Two) Times a Day., Disp: , Rfl:   •  cholecalciferol (VITAMIN D3) 1000 UNITS tablet, Take 1,000 Units by mouth Daily., Disp: , Rfl:   •  HYDROcodone-acetaminophen (NORCO) 5-325 MG per tablet, Take 1 tablet by mouth Every 8 (Eight) Hours As Needed for Moderate Pain ., Disp: , Rfl:   •  levothyroxine (LEVOXYL) 150 MCG tablet, Take 150 mcg by mouth Every Morning., Disp: , Rfl:   •  meloxicam (MOBIC) 7.5 MG tablet, Take 7.5 mg by mouth Daily., Disp: , Rfl:   •  metoprolol succinate XL (TOPROL-XL) 25 MG 24 hr tablet, Take 0.5 tablets by mouth Every Morning., Disp: 30 tablet, Rfl: 11  •  Multiple Vitamins-Minerals (CENTRUM ADULTS PO), Take 1 tablet by mouth Daily., Disp: , Rfl:   •  nortriptyline (PAMELOR) 25  "MG capsule, Take 25 mg by mouth Every Night., Disp: , Rfl:   •  O2 (OXYGEN), Inhale 2 L/min As Needed., Disp: , Rfl:   •  omeprazole (priLOSEC) 40 MG capsule, Take 40 mg by mouth Every Morning., Disp: , Rfl:   •  pancrelipase, Lip-Prot-Amyl, (CREON) 99323 UNITS capsule delayed-release particles capsule, Take 24,000 units of lipase by mouth 3 (Three) Times a Day With Meals., Disp: , Rfl:   •  pravastatin (PRAVACHOL) 40 MG tablet, Take 40 mg by mouth Every Night., Disp: , Rfl:   •  rOPINIRole (REQUIP) 1 MG tablet, Take 1 mg by mouth 2 (Two) Times a Day. Taking 1mg up to 2-3 times per day (max 4mg per day), Disp: , Rfl:   •  triamcinolone (KENALOG) 0.1 % cream, Apply 1 application topically 1 (One) Time Per Week., Disp: , Rfl:   •  vitamin B-12 (CYANOCOBALAMIN) 500 MCG tablet, Take 500 mcg by mouth Daily., Disp: , Rfl:   •  warfarin (COUMADIN) 1 MG tablet, Take  by mouth Take As Directed. 2 mg mon wed fri with 1 mg sun tue thurs and sat, Disp: , Rfl:   •  warfarin (COUMADIN) 2 MG tablet, 1 daily as directed, Disp: 30 tablet, Rfl: 11    HPI    Marilee Judge presents today for 3 month follow up of atrial fibrillation/sick sinus syndrome, valvular heart disease, hypertension, and hyperlipidemia. Since last visit, patient has been feeling well overall from a cardiovascular standpoint. She states that she feels \"pretty good\" being paced at 80 bpm. Patient denies chest pain, palpitations, shortness of breath, edema, PND, orthopnea, dizziness, and syncope. She states that she remains as active as possible. Her daughter notes that the patient does occasionally fall backwards when she goes to stand up. The patient believes that this is due to chronic leg soreness/fatigue.    The following portions of the patient's history were reviewed and updated as appropriate: allergies, current medications and problem list.    Pertinent positives as listed in the HPI.  All other systems reviewed are negative.    Vitals:    05/24/18 1311 " "  BP: 102/64   BP Location: Left arm   Patient Position: Sitting   Pulse: 90   SpO2: 90%   Weight: 47.8 kg (105 lb 6.4 oz)   Height: 167.6 cm (66\")       Physical Exam:  General: Alert and oriented to person, place, and time.  Neck: Jugular venous pressure is within normal limits. Carotids have normal upstrokes without bruits.   Cardiovascular: Regular rate and rhythm without murmur gallop or rub.  Lungs: Clear without rales or wheezes. Equal expansion is noted.   Extremities: Show trace edema.  Skin: warm and dry.  Neurologic: nonfocal    Diagnostic Data:    Lab Results   Component Value Date    INR 2.40 05/17/2018    INR 2.40 05/10/2018    INR 2.70 05/04/2018    PROTIME 20.4 (H) 12/11/2017    PROTIME 24.6 (H) 11/10/2017    PROTIME 45.6 (H) 10/18/2017     Lab Results   Component Value Date    GLUCOSE 98 02/21/2018    BUN 18 02/21/2018    CREATININE 0.80 02/21/2018    EGFRIFNONA 68 02/21/2018    BCR 22.5 02/21/2018    K 3.9 02/21/2018    CO2 30.0 02/21/2018    CALCIUM 9.2 02/21/2018    ALBUMIN 3.80 02/21/2018    LABIL2 1.2 (L) 02/21/2018    AST 37 (H) 02/21/2018    ALT 25 02/21/2018     Procedures     DEVICE INTERROGATION:  5/24/2018, MDT PPM: AP,  99%. VVIR 80. R wave is paced @ 40bpm with a threshold of 1.25 V at 0.46 msec and an impedance of 815 ohms. Battery voltage is 3.5 years. No events.  The device rate was reduced from 80 bpm to 70 bpm.      Assessment:      ICD-10-CM ICD-9-CM   1. Persistent atrial fibrillation I48.1 427.31   2. Peripheral vascular disease I73.9 443.9   3. Non-rheumatic mitral regurgitation, sp mitral clip procedure 9-12-17 I34.0 424.0   4. Essential hypertension I10 401.9   5. Mixed hyperlipidemia E78.2 272.2       Plan:    1. We will turn the patient's pacemaker down today from 80 bpm to 70 bpm.  2. Continue current medications.  3. F/up in 6  months or sooner if needed with MDT PM check and limited echo for MR/LV fxn    Scribed for Natasha Hanna MD by Adan Miles. " 5/24/2018  1:38 PM     I Natasha Hanna MD personally performed the services described in this documentation as scribed by the above individual in my presence, and it is both accurate and complete.    Natasha Hanna MD, FACC

## 2018-05-24 NOTE — PROGRESS NOTES
Anticoagulation Clinic - Remote Progress Note  HOME MONITOR (mdINR)  Frequency of Monitoring: weekly, every Wednesday    Indication: afib  Referring Provider: Cyndi  Initial Warfarin Start Date: 15 years ago  Goal INR: 2-3  Current Drug Interactions: aspirin, levothyroxine, ropinirole  CHADS-VASc: 5 (age, gender, HTN, CHF)    Diet: frequent, consistent  Alcohol: none  Tobacco: none  OTC Pain Medication: Excedrin (meloxicam)    1st clinic visit: 10/18/17    INR History:  Date  10/4 10/9 10/18 10/25 11/1 11/8 11/15 11/22 11/29 12/6 12/11 12/13   Total Weekly Dose 16.5 mg 14mg 14mg 14mg 11mg 9mg 10mg 10mg 9mg 9mg 9mg 10mg 11mg   INR  1.8 2.5 3.8 3.4 1.9 2.2 3.3 2.7 2.5 1.8 1.84 2.5   Notes pre- amio amio start 9/27 (BID)  clinic    Macrobid    PAT      Date 12/20 12/27 1/3/18 1/8 1/15 1/24 1/31 2/7 2/14 2/21 2/28 3/7   Total Weekly Dose 10mg 9mg 12mg 13mg 14mg 14mg 14mg 14mg 14mg 14mg 14mg 14mg   INR 2.2 1.7 1.7 1.9 2.2 2.6 2.7 2.4 2.9 2.5 1.9 2.0   Notes amio stop 12/14 1 missed dose boost              Date 3/14 3/21 3/28 4/4 4/11 4/16 4/18 4/23 4/25 5/1 5/4 5/10 5/17   Total Weekly Dose 14 mg 14 mg 13mg 13mg 12mg 14mg 15mg 14 mg 15mg 17mg 17mg 16mg 16mg   INR 3.4 3.3 2.9 3.8 1.2 1.3 1.5 1.3 1.4 1.8 2.7 2.4 2.4   Notes recom GLV decr dose   incr. GLV (must greens)  boost  boost         Date 5/24               Total Weekly Dose 16mg               INR 3.1;3.0               Notes                    Phone Interview:  Tablet Strength: 2mg tablets  Current Maintenance Dose: 2mg every day except 3mg on TuesThurs  Patient Contact Info: [phone] (Nazia Britton, daughter)    Patient Findings   Negatives:   Signs/symptoms of thrombosis, Signs/symptoms of bleeding, Laboratory test error suspected, Change in health, Change in alcohol use, Change in activity, Upcoming invasive procedure, Emergency department visit, Upcoming dental procedure, Missed doses, Extra doses, Change in medications, Change in diet/appetite,  Hospital admission, Bruising, Other complaints     Plan:  1. INR is therapeutic/slightly supratherapeutic on her home monitor vs. POC test in clinic. Home monitor technique was reviewed with CPhT and monitor cleaned. At this time will encourage patient to eat a serving of GLV (green beans tonight), and spread out doses to 2mg daily except 3mg TueSat.     2. Repeat INR in on Thursday on 5/31.  3. Verbal information provided over the phone to patient's daughter, Nazia. She RBV dosing instructions, expresses understanding with teach back, and has no further questions at this time.   4. Nazia denies need for warfarin refills at this time.     Lita Pascual, PharmD   5/24/2018  1:59 PM

## 2018-05-31 NOTE — PROGRESS NOTES
Anticoagulation Clinic - Remote Progress Note  HOME MONITOR (mdINR)  Frequency of Monitoring: weekly, every Wednesday    Indication: afib  Referring Provider: Cyndi  Initial Warfarin Start Date: 15 years ago  Goal INR: 2-3  Current Drug Interactions: aspirin, levothyroxine, ropinirole  CHADS-VASc: 5 (age, gender, HTN, CHF)    Diet: frequent, consistent. Peas, green beans 2-3x/week   Alcohol: none  Tobacco: none  OTC Pain Medication: Excedrin (meloxicam)    1st clinic visit: 10/18/17    INR History:  Date  10/4 10/9 10/18 10/25 11/1 11/8 11/15 11/22 11/29 12/6 12/11 12/13   Total Weekly Dose 16.5 mg 14mg 14mg 14mg 11mg 9mg 10mg 10mg 9mg 9mg 9mg 10mg 11mg   INR  1.8 2.5 3.8 3.4 1.9 2.2 3.3 2.7 2.5 1.8 1.84 2.5   Notes pre- amio amio start 9/27 (BID)  clinic    Macrobid    PAT      Date 12/20 12/27 1/3/18 1/8 1/15 1/24 1/31 2/7 2/14 2/21 2/28 3/7   Total Weekly Dose 10mg 9mg 12mg 13mg 14mg 14mg 14mg 14mg 14mg 14mg 14mg 14mg   INR 2.2 1.7 1.7 1.9 2.2 2.6 2.7 2.4 2.9 2.5 1.9 2.0   Notes amio stop 12/14 1 missed dose boost              Date 3/14 3/21 3/28 4/4 4/11 4/16 4/18 4/23 4/25 5/1 5/4 5/10 5/17   Total Weekly Dose 14 mg 14 mg 13mg 13mg 12mg 14mg 15mg 14 mg 15mg 17mg 17mg 16mg 16mg   INR 3.4 3.3 2.9 3.8 1.2 1.3 1.5 1.3 1.4 1.8 2.7 2.4 2.4   Notes recom GLV decr dose   incr. GLV (must greens)  boost  boost         Date 5/24 5/31            Total Weekly Dose 16mg 16mg            INR 3.1; 3.0 2.0            Notes Clinic;                Phone Interview:  Tablet Strength: 2mg tablets  Current Maintenance Dose: 2mg every day except 3mg on TuesSat  Patient Contact Info: [phone] (Nazia Britton, daughter)    Negatives:   Signs/symptoms of thrombosis, Signs/symptoms of bleeding, Laboratory test error suspected, Change in health, Change in alcohol use, Change in activity, Upcoming invasive procedure, Emergency department visit, Upcoming dental procedure, Missed doses, Extra doses, Change in medications, Change  in diet/appetite, Hospital admission, Bruising, Other complaints     Plan:  1. INR is therapeutic today at 2.0. Instructed patient's daughter to continue warfarin 2mg daily except 3mg TuesSat.     2. Repeat INR in 1 week, 6/7.   3. Verbal information provided over the phone to patient's daughter, Nazia. She RBV dosing instructions, expresses understanding with teach back, and has no further questions at this time.   4. Nazia denies need for warfarin refills at this time.     Dean Salcedo CPhT  5/31/2018  9:59 AM    I, Dean Hummel, [facility], have reviewed the note in full and agree with the assessment and plan.  05/31/18  10:29 AM

## 2018-06-07 NOTE — PROGRESS NOTES
Anticoagulation Clinic - Remote Progress Note  HOME MONITOR (mdINR)  Frequency of Monitoring: weekly, every Wednesday    Indication: afib  Referring Provider: Cyndi  Initial Warfarin Start Date: 15 years ago  Goal INR: 2-3  Current Drug Interactions: aspirin, levothyroxine, ropinirole  CHADS-VASc: 5 (age, gender, HTN, CHF)    Diet: frequent, consistent. Peas, green beans 2-3x/week   Alcohol: none  Tobacco: none  OTC Pain Medication: Excedrin (meloxicam)    1st clinic visit: 10/18/17    INR History:  Date 12/20 12/27 1/3/18 1/8 1/15 1/24 1/31 2/7 2/14 2/21 2/28 3/7   Total Weekly Dose 10mg 9mg 12mg 13mg 14mg 14mg 14mg 14mg 14mg 14mg 14mg 14mg   INR 2.2 1.7 1.7 1.9 2.2 2.6 2.7 2.4 2.9 2.5 1.9 2.0   Notes amio stop 12/14 1 missed dose boost              Date 3/14 3/21 3/28 4/4 4/11 4/16 4/18 4/23 4/25 5/1 5/4 5/10 5/17   Total Weekly Dose 14 mg 14 mg 13mg 13mg 12mg 14mg 15mg 14 mg 15mg 17mg 17mg 16mg 16mg   INR 3.4 3.3 2.9 3.8 1.2 1.3 1.5 1.3 1.4 1.8 2.7 2.4 2.4   Notes recom GLV decr dose   incr. GLV (must greens)  boost  boost         Date 5/24 5/31 6/7           Total Weekly Dose 16mg 16mg 16mg           INR 3.1; 3.0 2.0 3.1           Notes Clinic;                Phone Interview:  Tablet Strength: 2mg tablets  Current Maintenance Dose: 2mg every day except 3mg on TuesSat  Patient Contact Info: [phone] (Nazia Britton, daughter)    Patient Findings:  Negatives:   Signs/symptoms of thrombosis, Signs/symptoms of bleeding, Laboratory test error suspected, Change in health, Change in alcohol use, Change in activity, Upcoming invasive procedure, Emergency department visit, Upcoming dental procedure, Missed doses, Extra doses, Change in medications, Change in diet/appetite, Hospital admission, Bruising, Other complaints   Comments:   Patient daughter states no changes since last encounter     Plan:  1. INR is slightly SUPRAtherapeutic today at 3.1. After consulting with the pharmacist, instructed patient's  daughter to continue warfarin 2mg daily except 3mg TuesSat and eat an additional serving of GLV this week.  2. Repeat INR in one week, 6/14.   3. Verbal information provided over the phone to patient's daughter, Nazia. She RBV dosing instructions, expresses understanding with teach back, and has no further questions at this time.   4. Nazia denies need for warfarin refills at this time.     Leigha Cortez CPhT  6/7/2018  8:43 AM    Plan above worked to normalize INR two weeks prior. If INR remains elevated, consider dose reduction at follow up.  IGisela, [facility], have reviewed the note in full and agree with the assessment and plan.  06/08/18  8:25 AM

## 2018-06-14 NOTE — PROGRESS NOTES
Anticoagulation Clinic - Remote Progress Note  HOME MONITOR (mdINR)  Frequency of Monitoring: weekly, every Thursday    Indication: afib  Referring Provider: Cyndi  Initial Warfarin Start Date: 15 years ago  Goal INR: 2-3  Current Drug Interactions: aspirin, levothyroxine, ropinirole  CHADS-VASc: 5 (age, gender, HTN, CHF)    Diet: frequent, consistent. Peas, green beans 2-3x/week   Alcohol: none  Tobacco: none  OTC Pain Medication: Excedrin (meloxicam)    1st clinic visit: 10/18/17    INR History:  Date 12/20 12/27 1/3/18 1/8 1/15 1/24 1/31 2/7 2/14 2/21 2/28 3/7   Total Weekly Dose 10mg 9mg 12mg 13mg 14mg 14mg 14mg 14mg 14mg 14mg 14mg 14mg   INR 2.2 1.7 1.7 1.9 2.2 2.6 2.7 2.4 2.9 2.5 1.9 2.0   Notes amio stop 12/14 1 missed dose boost              Date 3/14 3/21 3/28 4/4 4/11 4/16 4/18 4/23 4/25 5/1 5/4 5/10 5/17   Total Weekly Dose 14 mg 14 mg 13mg 13mg 12mg 14mg 15mg 14 mg 15mg 17mg 17mg 16mg 16mg   INR 3.4 3.3 2.9 3.8 1.2 1.3 1.5 1.3 1.4 1.8 2.7 2.4 2.4   Notes recom GLV decr dose   incr. GLV (must greens)  boost  boost         Date 5/24 5/31 6/7 6/14          Total Weekly Dose 16mg 16mg 16mg 16mg          INR 3.1; 3.0 2.0 3.1 2.8          Notes Clinic;                Phone Interview:  Tablet Strength: 2mg tablets  Current Maintenance Dose: 2mg every day except 3mg on TuesSat  Patient Contact Info: [phone] (Nazia Britton, daughter)    Patient Findings:  Negatives:   Signs/symptoms of thrombosis, Signs/symptoms of bleeding, Laboratory test error suspected, Change in health, Change in alcohol use, Change in activity, Upcoming invasive procedure, Emergency department visit, Upcoming dental procedure, Missed doses, Extra doses, Change in medications, Change in diet/appetite, Hospital admission, Bruising, Other complaints   Comments:   Spoke with patient's daughter who states no changes since last encounter     Plan:  1. INR is back WNL today at 2.8 after an additional serving of GVL. Instructed  patient's daughter to continue warfarin 2mg daily except 3mg TuesSat.  2. Repeat INR in one week, 6/21.   3. Verbal information provided over the phone to patient's daughter, Nazia. She RBV dosing instructions, expresses understanding with teach back, and has no further questions at this time.   4. Nazia denies need for warfarin refills at this time.     Leigha Cortez, [facility]  6/14/2018  9:04 AM     May consider dose decrease if patient becomes Supratherapeutic again at next encounter.  I, Lita Pascual, PharmD, have reviewed the note in full and agree with the assessment and plan.  06/14/18  12:50 PM

## 2018-06-21 NOTE — PROGRESS NOTES
Anticoagulation Clinic - Remote Progress Note  HOME MONITOR (mdINR)  Frequency of Monitoring: weekly, every Thursday    Indication: afib  Referring Provider: Cyndi  Initial Warfarin Start Date: 15 years ago  Goal INR: 2-3  Current Drug Interactions: aspirin, levothyroxine, ropinirole  CHADS-VASc: 5 (age, gender, HTN, CHF)    Diet: frequent, consistent. Peas, green beans 2-3x/week   Alcohol: none  Tobacco: none  OTC Pain Medication: Excedrin (meloxicam)    1st clinic visit: 10/18/17    INR History:  Date 12/20 12/27 1/3/18 1/8 1/15 1/24 1/31 2/7 2/14 2/21 2/28 3/7   Total Weekly Dose 10mg 9mg 12mg 13mg 14mg 14mg 14mg 14mg 14mg 14mg 14mg 14mg   INR 2.2 1.7 1.7 1.9 2.2 2.6 2.7 2.4 2.9 2.5 1.9 2.0   Notes amio stop 12/14 1 missed dose boost              Date 3/14 3/21 3/28 4/4 4/11 4/16 4/18 4/23 4/25 5/1 5/4 5/10 5/17   Total Weekly Dose 14 mg 14 mg 13mg 13mg 12mg 14mg 15mg 14 mg 15mg 17mg 17mg 16mg 16mg   INR 3.4 3.3 2.9 3.8 1.2 1.3 1.5 1.3 1.4 1.8 2.7 2.4 2.4   Notes recom GLV decr dose   incr. GLV (must greens)  boost  boost         Date 5/24 5/31 6/7 6/14 6/21         Total Weekly Dose 16mg 16mg 16mg 16mg 16 mg         INR 3.1; 3.0 2.0 3.1 2.8 3.7         Notes Clinic; [facility]           Phone Interview:  Tablet Strength: 2mg tablets  Current Maintenance Dose: 2mg every day except 3mg on TuesSat  Patient Contact Info: [phone] (Nazia Britton, daughter)    Patient Findings   Positives:   Change in diet/appetite, Other complaints   Negatives:   Signs/symptoms of thrombosis, Signs/symptoms of bleeding, Laboratory test error suspected, Change in health, Change in alcohol use, Change in activity, Upcoming invasive procedure, Emergency department visit, Upcoming dental procedure, Missed doses, Extra doses, Change in medications, Hospital admission, Bruising   Comments:   Nazia reports that she has not eaten food for the past 2 days and has been feeling ill. Encouraged patient's daughter to discuss with  PCP, however, daughter says she goes through this. Nazia reports that she is doing better today.       Plan:  1. INR is SUPRAtherapeutic today- hasn't been feeling well over the past two days and has had a decrease in appetite. Instructed patient's daughter to decrease dose tonight to 1mg tablet, then decrease weekly dose to warfarin 2mg daily except 3mg Tues for now.  2. Repeat INR on Monday (6/25) to ensure WNL given illness.   3. Verbal information provided over the phone to patient's daughter, Nazia. She RBV dosing instructions, expresses understanding with teach back, and has no further questions at this time.   4. Nazia denies need for warfarin refills at this time.     Lita Pascual, PharmD  6/21/2018  12:55 PM

## 2018-06-25 NOTE — TELEPHONE ENCOUNTER
Pt requests refills for warfarin 2mg tablets be sent to [facility] mail order.    Toni Bernal PharmD  Pharmacy Resident  6/25/2018  11:16 AM

## 2018-06-25 NOTE — PROGRESS NOTES
Anticoagulation Clinic - Remote Progress Note  HOME MONITOR (mdINR)  Frequency of Monitoring: weekly, every Thursday    Indication: afib  Referring Provider: Cyndi  Initial Warfarin Start Date: 15 years ago  Goal INR: 2-3  Current Drug Interactions: aspirin, levothyroxine, ropinirole  CHADS-VASc: 5 (age, gender, HTN, CHF)    Diet: frequent, consistent. Peas, green beans 2-3x/week   Alcohol: none  Tobacco: none  OTC Pain Medication: Excedrin (meloxicam)    1st clinic visit: 10/18/17    INR History:  Date 12/20 12/27 1/3/18 1/8 1/15 1/24 1/31 2/7 2/14 2/21 2/28 3/7   Total Weekly Dose 10mg 9mg 12mg 13mg 14mg 14mg 14mg 14mg 14mg 14mg 14mg 14mg   INR 2.2 1.7 1.7 1.9 2.2 2.6 2.7 2.4 2.9 2.5 1.9 2.0   Notes amio stop 12/14 1 missed dose boost              Date 3/14 3/21 3/28 4/4 4/11 4/16 4/18 4/23 4/25 5/1 5/4 5/10 5/17   Total Weekly Dose 14 mg 14 mg 13mg 13mg 12mg 14mg 15mg 14 mg 15mg 17mg 17mg 16mg 16mg   INR 3.4 3.3 2.9 3.8 1.2 1.3 1.5 1.3 1.4 1.8 2.7 2.4 2.4   Notes recom GLV decr dose   incr. GLV (must greens)  boost  boost         Date 5/24 5/31 6/7 6/14 6/21 6/25        Total Weekly Dose 16mg 16mg 16mg 16mg 16 mg 14mg 15mg       INR 3.1; 3.0 2.0 3.1 2.8 3.7 2.3        Notes Clinic; [facility]           Phone Interview:  Tablet Strength: 2mg tablets  Current Maintenance Dose: 2mg every day except 3mg on TuesSat  Patient Contact Info: [phone] (Nazia Britton, daughter)    Patient Findings     Positives:   Change in health   Negatives:   Signs/symptoms of thrombosis, Signs/symptoms of bleeding, Laboratory test error suspected, Change in alcohol use, Change in activity, Upcoming invasive procedure, Emergency department visit, Upcoming dental procedure, Missed doses, Extra doses, Change in medications, Change in diet/appetite, Hospital admission, Bruising, Other complaints   Comments:   Nazia states that pt is feeling a little bit better but still not at her baseline. Other than illness, Nazia reports  no other changes           Plan:  1. INR is therapeutic today at 2.3 after dose adjustment one day last week. Instructed Nazia to have pt continue warfarin 2mg daily except 3mg Tues for now.  2. Repeat INR on Friday (6/29) to ensure WNL given illness.   3. Verbal information provided over the phone to patient's daughter, Nazia. She RBV dosing instructions, expresses understanding with teach back, and has no further questions at this time.   4. Nazia denies need for warfarin refills at this time.     Toni Bernal, PharmD  Pharmacy Resident  6/25/2018  11:12 AM

## 2018-06-29 NOTE — PROGRESS NOTES
Anticoagulation Clinic - Remote Progress Note  HOME MONITOR (mdINR)  Frequency of Monitoring: weekly, every Thursday    Indication: afib  Referring Provider: Cyndi  Initial Warfarin Start Date: 15 years ago  Goal INR: 2.0-3.0  Current Drug Interactions: aspirin, levothyroxine, ropinirole  CHADS-VASc: 5 (age, gender, HTN, CHF)    Diet: frequent, consistent. Peas, green beans 2-3x/week   Alcohol: none  Tobacco: none  OTC Pain Medication: Excedrin (meloxicam)    1st clinic visit: 10/18/17    INR History:  Date 12/20 12/27 1/3/18 1/8 1/15 1/24 1/31 2/7 2/14 2/21 2/28 3/7   Total Weekly Dose 10mg 9mg 12mg 13mg 14mg 14mg 14mg 14mg 14mg 14mg 14mg 14mg   INR 2.2 1.7 1.7 1.9 2.2 2.6 2.7 2.4 2.9 2.5 1.9 2.0   Notes amio stop 12/14 1 missed dose boost              Date 3/14 3/21 3/28 4/4 4/11 4/16 4/18 4/23 4/25 5/1 5/4 5/10 5/17   Total Weekly Dose 14 mg 14 mg 13mg 13mg 12mg 14mg 15mg 14 mg 15mg 17mg 17mg 16mg 16mg   INR 3.4 3.3 2.9 3.8 1.2 1.3 1.5 1.3 1.4 1.8 2.7 2.4 2.4   Notes recom GLV decr dose   incr. GLV (must greens)  boost  boost         Date 5/24 5/31 6/7 6/14 6/21 6/25 6/29       Total Weekly Dose 16mg 16mg 16mg 16mg 16 mg 14mg 15mg       INR 3.1; 3.0 2.0 3.1 2.8 3.7 2.3 2.7       Notes Clinic; [facility]           Phone Interview:  Tablet Strength: 2mg tablets  Current Maintenance Dose: 2mg every day except 3mg on TuesSat  Patient Contact Info: [phone] (Nazia Britton, daughter)  Lab Contact Info: mdINR      Plan:  1. INR is therapeutic today at 2.7. Instructed daughter, Nazia, to have pt continue warfarin 2mg daily except 3mg Tues for now.  2. Repeat INR in ~1 week, 7/5  3. Verbal information provided over the phone to patient's daughter, Nazia. She RBV dosing instructions, expresses understanding with teach back, and has no further questions at this time.   4. Nazia denies need for warfarin refills at this time.     Toni Bernal, PharmD  Pharmacy Resident  6/29/2018  1:57 PM

## 2018-07-05 NOTE — PROGRESS NOTES
Anticoagulation Clinic - Remote Progress Note  mdINR HOME MONITOR  Frequency of Monitoring: weekly, every Thursday    Indication: afib  Referring Provider: Cyndi  Initial Warfarin Start Date: 15 years ago  Goal INR: 2.0-3.0  Current Drug Interactions: aspirin, levothyroxine, ropinirole  CHADS-VASc: 5 (age, gender, HTN, CHF)    Diet: frequent, consistent. Peas, green beans 2-3x/week   Alcohol: none  Tobacco: none  OTC Pain Medication: Excedrin (meloxicam)    1st clinic visit: 10/18/17    INR History:  Date 12/20 12/27 1/3/18 1/8 1/15 1/24 1/31 2/7 2/14 2/21 2/28 3/7   Total Weekly Dose 10mg 9mg 12mg 13mg 14mg 14mg 14mg 14mg 14mg 14mg 14mg 14mg   INR 2.2 1.7 1.7 1.9 2.2 2.6 2.7 2.4 2.9 2.5 1.9 2.0   Notes amio stop 12/14 1 missed dose boost              Date 3/14 3/21 3/28 4/4 4/11 4/16 4/18 4/23 4/25 5/1 5/4 5/10 5/17   Total Weekly Dose 14 mg 14 mg 13mg 13mg 12mg 14mg 15mg 14 mg 15mg 17mg 17mg 16mg 16mg   INR 3.4 3.3 2.9 3.8 1.2 1.3 1.5 1.3 1.4 1.8 2.7 2.4 2.4   Notes recom GLV decr dose   incr. GLV (must greens)  boost  boost         Date 5/24 5/31 6/7 6/14 6/21 6/25 6/29 7/4      Total Weekly Dose 16mg 16mg 16mg 16mg 16 mg 14mg 15mg 15mg      INR 3.1; 3.0 2.0 3.1 2.8 3.7 2.3 2.7 2.4      Notes Clinic; [facility]           Phone Interview:  Tablet Strength: 2mg tablets  Current Maintenance Dose: 2mg every day except 3mg on TuesSat  Patient Contact Info: [phone] (Nazia Britton, daughter)  Lab Contact Info: mdINR    Plan:  1. INR is therapeutic, so instructed pt's daughter, Ms. Britton, to have her continue warfarin 2mg daily except 3mg Tuesday again this week.  2. Repeat INR in 1 week.  3. Verbal information provided over the phone to patient's daughter. She RBV dosing instructions, expresses understanding with teach back, and has no further questions at this time.   4. Called and LVM for Zac at [employer]. Ms. Britton is interested in transitioning from [facility] to [facility], especially given recent  issues with her home monitor (incorrect display + inadequate customer service).     Ann Cowden Mayer, PharmAMOL  7/5/2018  8:43 AM

## 2018-07-12 NOTE — PROGRESS NOTES
Anticoagulation Clinic - Remote Progress Note  mdINR HOME MONITOR  Frequency of Monitoring: weekly, every Thursday    Indication: afib  Referring Provider: Cyndi  Initial Warfarin Start Date: 15 years ago  Goal INR: 2.0-3.0  Current Drug Interactions: aspirin, levothyroxine, ropinirole  CHADS-VASc: 5 (age, gender, HTN, CHF)    Diet: frequent, consistent. Peas, green beans 2-3x/week   Alcohol: none  Tobacco: none  OTC Pain Medication: Excedrin (meloxicam)    1st clinic visit: 10/18/17    INR History:  Date 12/20 12/27 1/3/18 1/8 1/15 1/24 1/31 2/7 2/14 2/21 2/28 3/7   Total Weekly Dose 10mg 9mg 12mg 13mg 14mg 14mg 14mg 14mg 14mg 14mg 14mg 14mg   INR 2.2 1.7 1.7 1.9 2.2 2.6 2.7 2.4 2.9 2.5 1.9 2.0   Notes amio stop 12/14 1 missed dose boost              Date 3/14 3/21 3/28 4/4 4/11 4/16 4/18 4/23 4/25 5/1 5/4 5/10 5/17   Total Weekly Dose 14mg 14mg 13mg 13mg 12mg 14mg 15mg 14mg 15mg 17mg 17mg 16mg 16mg   INR 3.4 3.3 2.9 3.8 1.2 1.3 1.5 1.3 1.4 1.8 2.7 2.4 2.4   Notes recom GLV decr dose   incr. GLV (must greens)  boost  boost         Date 5/24 5/31 6/7 6/14 6/21 6/25 6/29 7/4 7/12     Total Weekly Dose 16mg 16mg 16mg 16mg 16mg 14mg 15mg 15mg 15mg     INR 3.1; 3.0 2.0 3.1 2.8 3.7 2.3 2.7 2.7 2.5     Notes Clinic; [facility]           Phone Interview:  Tablet Strength: 2mg tablets  Current Maintenance Dose: 2mg every day except 3mg on TuesSat  Patient Contact Info: [phone] (Nazia Britton, daughter)  Lab Contact Info: mdINR    Patient Findings   Negatives:   Signs/symptoms of thrombosis, Signs/symptoms of bleeding, Laboratory test error suspected, Change in health, Change in alcohol use, Change in activity, Upcoming invasive procedure, Emergency department visit, Upcoming dental procedure, Missed doses, Extra doses, Change in medications, Change in diet/appetite, Hospital admission, Bruising, Other complaints     Plan:  1. INR is therapeutic today at 2.5. Instructed pt's daughter, Ms. Britton, to have her  continue warfarin 2mg daily except 3mg Tuesday.  2. Repeat INR in 1 week, 7/19.  3. Verbal information provided over the phone to patient's daughter. She RBV dosing instructions, expresses understanding with teach back, and has no further questions at this time.   4. Daughter reports that they have signed and mailed out the forms needed by [facility] to start the process of switching from Josu[facility] to [facility].    Dean Salcedo CPhT  7/12/2018  8:48 AM    I, Gisela Thornton, [facility], have reviewed the note in full and agree with the assessment and plan.  07/12/18  5:15 PM

## 2018-07-19 NOTE — PROGRESS NOTES
Anticoagulation Clinic - Remote Progress Note  mdINR HOME MONITOR  Frequency of Monitoring: weekly, every Thursday    Indication: afib  Referring Provider: Cyndi  Initial Warfarin Start Date: 15 years ago  Goal INR: 2.0-3.0  Current Drug Interactions: aspirin, levothyroxine, ropinirole  CHADS-VASc: 5 (age, gender, HTN, CHF)    Diet: frequent, consistent. Peas, green beans 2-3x/week   Alcohol: none  Tobacco: none  OTC Pain Medication: Excedrin (meloxicam)    1st clinic visit: 10/18/17    INR History:  Date 12/20 12/27 1/3/18 1/8 1/15 1/24 1/31 2/7 2/14 2/21 2/28 3/7   Total Weekly Dose 10mg 9mg 12mg 13mg 14mg 14mg 14mg 14mg 14mg 14mg 14mg 14mg   INR 2.2 1.7 1.7 1.9 2.2 2.6 2.7 2.4 2.9 2.5 1.9 2.0   Notes amio stop 12/14 1 missed dose boost              Date 3/14 3/21 3/28 4/4 4/11 4/16 4/18 4/23 4/25 5/1 5/4 5/10 5/17   Total Weekly Dose 14mg 14mg 13mg 13mg 12mg 14mg 15mg 14mg 15mg 17mg 17mg 16mg 16mg   INR 3.4 3.3 2.9 3.8 1.2 1.3 1.5 1.3 1.4 1.8 2.7 2.4 2.4   Notes recom GLV decr dose   incr. GLV (must greens)  boost  boost         Date 5/24 5/31 6/7 6/14 6/21 6/25 6/29 7/4 7/12 7/19    Total Weekly Dose 16mg 16mg 16mg 16mg 16mg 14mg 15mg 15mg 15mg 15mg    INR 3.1; 3.0 2.0 3.1 2.8 3.7 2.3 2.7 2.7 2.5 3.1    Notes Clinic; [facility]           Phone Interview:  Tablet Strength: 2mg tablets  Current Maintenance Dose: 2mg every day except 3mg on Tuesday  Patient Contact Info: [phone] (Nazia Britton, daughter)  Lab Contact Info: Josu[facility]    Patient Findings   Positives:   Change in health, Change in medications   Negatives:   Signs/symptoms of thrombosis, Signs/symptoms of bleeding, Laboratory test error suspected, Change in alcohol use, Change in activity, Upcoming invasive procedure, Emergency department visit, Upcoming dental procedure, Missed doses, Extra doses, Change in diet/appetite, Hospital admission, Bruising, Other complaints   Comments:   Took Excedrin (APAP/ASA/Caffeine) 2-3x this past week for  migraines     Plan:  1. INR is slightly SUPRAtherapeutic today at 3.1. Instructed pt's daughter, Nazia, to have patient eat a serving of GLV tonight and then to continue warfarin 2mg daily except 3mg Tuesday.  2. Repeat INR in 1 week, 7/26  3. Verbal information provided over the phone to patient's daughter. She RBV dosing instructions, expresses understanding with teach back, and has no further questions at this time.     Dean Salcedo CPhT  7/19/2018  8:22 AM     I, Corrine Urban [facility], have reviewed the note in full and agree with the assessment and plan.  07/19/18  9:22 AM

## 2018-07-26 NOTE — PROGRESS NOTES
Anticoagulation Clinic - Remote Progress Note  mdINR HOME MONITOR  Frequency of Monitoring: weekly, every Thursday    Indication: afib  Referring Provider: Cyndi  Initial Warfarin Start Date: 15 years ago  Goal INR: 2.0-3.0  Current Drug Interactions: aspirin, levothyroxine, ropinirole  CHADS-VASc: 5 (age, gender, HTN, CHF)    Diet: frequent, consistent. Peas, green beans 2-3x/week   Alcohol: none  Tobacco: none  OTC Pain Medication: Excedrin (meloxicam)    1st clinic visit: 10/18/17    INR History:  Date 12/20 12/27 1/3/18 1/8 1/15 1/24 1/31 2/7 2/14 2/21 2/28 3/7   Total Weekly Dose 10mg 9mg 12mg 13mg 14mg 14mg 14mg 14mg 14mg 14mg 14mg 14mg   INR 2.2 1.7 1.7 1.9 2.2 2.6 2.7 2.4 2.9 2.5 1.9 2.0   Notes amio stop 12/14 1 missed dose boost              Date 3/14 3/21 3/28 4/4 4/11 4/16 4/18 4/23 4/25 5/1 5/4 5/10 5/17   Total Weekly Dose 14mg 14mg 13mg 13mg 12mg 14mg 15mg 14mg 15mg 17mg 17mg 16mg 16mg   INR 3.4 3.3 2.9 3.8 1.2 1.3 1.5 1.3 1.4 1.8 2.7 2.4 2.4   Notes recom GLV decr dose   incr. GLV (must greens)  boost  boost         Date 5/24 5/31 6/7 6/14 6/21 6/25 6/29 7/4 7/12 7/19 7/26   Total Weekly Dose 16mg 16mg 16mg 16mg 16mg 14mg 15mg 15mg 15mg 15mg 15mg   INR 3.1; 3.0 2.0 3.1 2.8 3.7 2.3 2.7 2.7 2.5 3.1 2.5   Notes Clinic;     ill      Inc GLV     Phone Interview:  Tablet Strength: 2mg tablets  Current Maintenance Dose: 2mg every day except 3mg on Tuesday  Patient Contact Info: [phone] (Nazia Britton, daughter)  Lab Contact Info: mdINR    Patient Findings   Positives:   Change in medications   Negatives:   Signs/symptoms of thrombosis, Signs/symptoms of bleeding, Laboratory test error suspected, Change in health, Change in alcohol use, Change in activity, Upcoming invasive procedure, Emergency department visit, Upcoming dental procedure, Missed doses, Extra doses, Change in diet/appetite, Hospital admission, Bruising, Other complaints   Comments:   As of today, is now taking her Requip TID  instead of BID (per micromedex, Requip may lead to increased INR)     Plan:  1. INR is therapeutic at 2.5. Instructed pt's daughter, Nazia, to have patient continue warfarin 2mg daily except 3mg Tuesday.  2. Repeat INR in 1 week, 8/2  3. Verbal information provided over the phone to patient's daughter. She RBV dosing instructions, expresses understanding with teach back, and has no further questions at this time.     Dean Salcedo, Savita  7/26/2018  2:27 PM     I, Arabella Brice, PharmD, have reviewed the note in full and agree with the assessment and plan. Continue to monitor closely for effect of increased dose of Requip.  07/26/18  2:40 PM

## 2018-07-27 NOTE — TELEPHONE ENCOUNTER
Lm on jb's VM- she is requesting a standing order for BMP- that is not needed at this time- her last labs look good- we will recheck towards the end of the year-

## 2018-08-02 NOTE — PROGRESS NOTES
Anticoagulation Clinic - Remote Progress Note  mdINR HOME MONITOR  Frequency of Monitoring: weekly, every Thursday    Indication: afib  Referring Provider: Cyndi  Initial Warfarin Start Date: 15 years ago  Goal INR: 2.0-3.0  Current Drug Interactions: aspirin, levothyroxine, ropinirole  CHADS-VASc: 5 (age, gender, HTN, CHF)    Diet: frequent, consistent. Peas, green beans 2-3x/week   Alcohol: none  Tobacco: none  OTC Pain Medication: Excedrin (meloxicam)    1st clinic visit: 10/18/17    INR History:  Date 12/20 12/27 1/3/18 1/8 1/15 1/24 1/31 2/7 2/14 2/21 2/28 3/7   Total Weekly Dose 10mg 9mg 12mg 13mg 14mg 14mg 14mg 14mg 14mg 14mg 14mg 14mg   INR 2.2 1.7 1.7 1.9 2.2 2.6 2.7 2.4 2.9 2.5 1.9 2.0   Notes amio stop 12/14 1 missed dose boost              Date 3/14 3/21 3/28 4/4 4/11 4/16 4/18 4/23 4/25 5/1 5/4 5/10 5/17   Total Weekly Dose 14mg 14mg 13mg 13mg 12mg 14mg 15mg 14mg 15mg 17mg 17mg 16mg 16mg   INR 3.4 3.3 2.9 3.8 1.2 1.3 1.5 1.3 1.4 1.8 2.7 2.4 2.4   Notes recom GLV decr dose   incr. GLV (must greens)  boost  boost         Date 5/24 5/31 6/7 6/14 6/21 6/25 6/29 7/4 7/12 7/19 7/26   Total Weekly Dose 16mg 16mg 16mg 16mg 16mg 14mg 15mg 15mg 15mg 15mg 15mg   INR 3.1; 3.0 2.0 3.1 2.8 3.7 2.3 2.7 2.7 2.5 3.1 2.5   Notes Clinic;     ill      Inc GLV     Date 8/2             Total Weekly Dose 15mg             INR 1.9             Notes Incr GLV boost              Phone Interview:  Tablet Strength: 2mg tablets  Current Maintenance Dose: 2mg every day except 3mg on Tuesday  Patient Contact Info: [phone] (Nazia Britton, daughter)  Lab Contact Info: mdINR    Patient Findings   Positives:   Change in diet/appetite   Negatives:   Signs/symptoms of thrombosis, Signs/symptoms of bleeding, Laboratory test error suspected, Change in health, Change in alcohol use, Change in activity, Upcoming invasive procedure, Emergency department visit, Upcoming dental procedure, Missed doses, Extra doses, Change in  medications, Hospital admission, Bruising, Other complaints   Comments:   Patient are more cabbage and mira slaw this past week     Plan:  1. INR is slightly subtherapeutic. After consulting with Michelle Urban, PharmD, instructed pt's daughter, Nazia, to have patient BOOST tonight's dose (6.7%) to 3mg and then continue warfarin 2mg daily except 3mg Tuesday.  2. Repeat INR in 1 week, 8/9.  3. Verbal information provided over the phone to patient's daughter. She RBV dosing instructions, expresses understanding with teach back, and has no further questions at this time.     Dean Salcedo CPhT  8/2/2018  2:51 PM     I, Gisela Thornton, formerly [facility], have reviewed the note in full and agree with the assessment and plan.  08/05/18  7:39 AM

## 2018-08-09 NOTE — PROGRESS NOTES
Anticoagulation Clinic - Remote Progress Note  mdINR HOME MONITOR  Frequency of Monitoring: weekly, every Thursday    Indication: afib  Referring Provider: Cyndi  Initial Warfarin Start Date: 15 years ago  Goal INR: 2.0-3.0  Current Drug Interactions: aspirin, levothyroxine, ropinirole  CHADS-VASc: 5 (age, gender, HTN, CHF)    Diet: frequent, consistent. Peas, green beans 2-3x/week   Alcohol: none  Tobacco: none  OTC Pain Medication: Excedrin (meloxicam)    1st clinic visit: 10/18/17    INR History:  Date 12/20 12/27 1/3/18 1/8 1/15 1/24 1/31 2/7 2/14 2/21 2/28 3/7   Total Weekly Dose 10mg 9mg 12mg 13mg 14mg 14mg 14mg 14mg 14mg 14mg 14mg 14mg   INR 2.2 1.7 1.7 1.9 2.2 2.6 2.7 2.4 2.9 2.5 1.9 2.0   Notes amio stop 12/14 1 missed dose boost              Date 3/14 3/21 3/28 4/4 4/11 4/16 4/18 4/23 4/25 5/1 5/4 5/10 5/17   Total Weekly Dose 14mg 14mg 13mg 13mg 12mg 14mg 15mg 14mg 15mg 17mg 17mg 16mg 16mg   INR 3.4 3.3 2.9 3.8 1.2 1.3 1.5 1.3 1.4 1.8 2.7 2.4 2.4   Notes recom GLV decr dose   incr. GLV (must greens)  boost  boost         Date 5/24 5/31 6/7 6/14 6/21 6/25 6/29 7/4 7/12 7/19 7/26   Total Weekly Dose 16mg 16mg 16mg 16mg 16mg 14mg 15mg 15mg 15mg 15mg 15mg   INR 3.1; 3.0 2.0 3.1 2.8 3.7 2.3 2.7 2.7 2.5 3.1 2.5   Notes Clinic;     ill      Inc GLV     Date 8/2 8/9            Total Weekly Dose 15mg 16mg            INR 1.9 2.0            Notes Incr GLV boost              Phone Interview:  Tablet Strength: 2mg tablets  Current Maintenance Dose: 2mg every day except 3mg on Tuesday  Patient Contact Info: [phone] (Nazia Britton, daughter)  Lab Contact Info: Josué    Patient Findings   Positives:   Change in medications, Change in diet/appetite   Negatives:   Signs/symptoms of thrombosis, Signs/symptoms of bleeding, Laboratory test error suspected, Change in health, Change in alcohol use, Change in activity, Upcoming invasive procedure, Emergency department visit, Upcoming dental procedure, Missed  doses, Extra doses, Hospital admission, Bruising, Other complaints   Comments:   Patient was directed to stop taking Mobic on 8/4. Pt daughter believes they may put her back on it soon, she is fully aware of the risk of intereaction between Mobic and warfarin.     Daughter also reports patient has had a lowered appetite recently and has lost ~6lbs this week. Currently the patient drinks 1 ensure a day around noon, daughter says she tries for 2 ensure drinks a day but isn't always successful      Plan:  1. INR is therapeutic and WNL today at 2.0, although it is at the lower end of her goal. Instructed pt's daughter, Nazia, to have patient continue warfarin 2mg daily except 3mg Tuesday, for now  2. Repeat INR in 1 week, 8/15  3. Verbal information provided over the phone to patient's daughter. She RBV dosing instructions, expresses understanding with teach back, and has no further questions at this time.   4. Patient is now linked in [facility] and we should see her results as they become available    Dean Salcedo CPhT  8/9/2018  1:58 PM     Watch closely as slight increase in INR may occur before a major increase based on the above(excluding ensure drinks); feeding tubes and supplements via feeding tubes are notoriously difficult to manage with warfarin.  I, Dean Hummel, [facility], have reviewed the note in full and agree with the assessment and plan.  08/09/18  4:48 PM

## 2018-08-16 NOTE — PROGRESS NOTES
Anticoagulation Clinic - Remote Progress Note  mdINR HOME MONITOR  Frequency of Monitoring: weekly, every Thursday    Indication: afib  Referring Provider: Cyndi  Initial Warfarin Start Date: 15 years ago  Goal INR: 2.0-3.0  Current Drug Interactions: aspirin, levothyroxine, ropinirole  CHADS-VASc: 5 (age, gender, HTN, CHF)    Diet: frequent, consistent. Peas, green beans 2-3x/week   Alcohol: none  Tobacco: none  OTC Pain Medication: Excedrin (meloxicam)    1st clinic visit: 10/18/17    INR History:  Date 12/20 12/27 1/3/18 1/8 1/15 1/24 1/31 2/7 2/14 2/21 2/28 3/7   Total Weekly Dose 10mg 9mg 12mg 13mg 14mg 14mg 14mg 14mg 14mg 14mg 14mg 14mg   INR 2.2 1.7 1.7 1.9 2.2 2.6 2.7 2.4 2.9 2.5 1.9 2.0   Notes amio stop 12/14 1 missed dose boost              Date 3/14 3/21 3/28 4/4 4/11 4/16 4/18 4/23 4/25 5/1 5/4 5/10 5/17   Total Weekly Dose 14mg 14mg 13mg 13mg 12mg 14mg 15mg 14mg 15mg 17mg 17mg 16mg 16mg   INR 3.4 3.3 2.9 3.8 1.2 1.3 1.5 1.3 1.4 1.8 2.7 2.4 2.4   Notes recom GLV decr dose   incr. GLV (must greens)  boost  boost         Date 5/24 5/31 6/7 6/14 6/21 6/25 6/29 7/4 7/12 7/19 7/26   Total Weekly Dose 16mg 16mg 16mg 16mg 16mg 14mg 15mg 15mg 15mg 15mg 15mg   INR 3.1; 3.0 2.0 3.1 2.8 3.7 2.3 2.7 2.7 2.5 3.1 2.5   Notes Clinic;     ill      Inc GLV     Date 8/2 8/9 8/16 8/23          Total Weekly Dose 15mg 16mg 15mg 15 mg          INR 1.9 2.0 2.6           Notes Incr GLV boost              Phone Interview:  Tablet Strength: 2mg tablets  Current Maintenance Dose: 2mg every day except 3mg on Tuesday  Patient Contact Info: [phone] (Nazia Britton, daughter)  Lab Contact Info: mdINR    Patient Findings   Positives:   Change in medications   Negatives:   Signs/symptoms of thrombosis, Signs/symptoms of bleeding, Laboratory test error suspected, Change in health, Change in alcohol use, Change in activity, Upcoming invasive procedure, Emergency department visit, Upcoming dental procedure, Missed doses,  Extra doses, Change in diet/appetite, Hospital admission, Bruising, Other complaints   Comments:   Spoke with patient's daughter, Nazia, Ms. Judge is back on mobic as of last night (was off for one week but was unable to go without). She has had increase in GLV lately with fresh vegetables, however, still doesn't have much appetite. Ms. Judge is going to see GI this morning. I encouraged Nazia to let the anticoagulation clinic know if they plan on any upcoming procedures. Nazia denied any signs of bleeding, abnormal bruises, missed doses, extra doses. I explained that the INR is in goal range, however, it did increase from last week. Patient has also restarted mobic (DDI with warfarin--increased risk of bleeding). I encouraged the patient's daughter to call if there are any signs of bleeding in the next week, and to seek medical treatment if the patient were to fall and hit her head.      Plan:  1. INR is therapeutic on 8/16 at 2.6 (0.6 INR increase from 8/9). Instructed pt's daughter, aNzia, to have Ms. Judge continue warfarin 2mg daily except 3mg Tuesday, this week.   2. Repeat INR in 1 week, 8/23, and call the Anticoagulation Clinic if there are any signs of bleeding.  3. Verbal information provided over the phone to patient's daughter. She RBV dosing instructions, expresses understanding with teach back, and has no further questions at this time.     Dunia Hahn, Pharmacy Intern  8/16/2018  9:19 AM       I, Dean Hummel, [facility], have reviewed the note in full and agree with the assessment and plan.  08/16/18  10:02 AM

## 2018-08-23 NOTE — PROGRESS NOTES
Anticoagulation Clinic - Remote Progress Note  mdINR HOME MONITOR  Frequency of Monitoring: weekly, every Thursday    Indication: afib  Referring Provider: Cyndi  Initial Warfarin Start Date: 15 years ago  Goal INR: 2.0-3.0  Current Drug Interactions: aspirin, levothyroxine, ropinirole  CHADS-VASc: 5 (age, gender, HTN, CHF)    Diet: frequent, consistent. Peas, green beans 2-3x/week   Alcohol: none  Tobacco: none  OTC Pain Medication: Excedrin (meloxicam)    1st clinic visit: 10/18/17    INR History:  Date 12/20 12/27 1/3/18 1/8 1/15 1/24 1/31 2/7 2/14 2/21 2/28 3/7   Total Weekly Dose 10mg 9mg 12mg 13mg 14mg 14mg 14mg 14mg 14mg 14mg 14mg 14mg   INR 2.2 1.7 1.7 1.9 2.2 2.6 2.7 2.4 2.9 2.5 1.9 2.0   Notes amio stop 12/14 1 missed dose boost              Date 3/14 3/21 3/28 4/4 4/11 4/16 4/18 4/23 4/25 5/1 5/4 5/10 5/17   Total Weekly Dose 14mg 14mg 13mg 13mg 12mg 14mg 15mg 14mg 15mg 17mg 17mg 16mg 16mg   INR 3.4 3.3 2.9 3.8 1.2 1.3 1.5 1.3 1.4 1.8 2.7 2.4 2.4   Notes recom GLV decr dose   incr. GLV (must greens)  boost  boost         Date 5/24 5/31 6/7 6/14 6/21 6/25 6/29 7/4 7/12 7/19 7/26   Total Weekly Dose 16mg 16mg 16mg 16mg 16mg 14mg 15mg 15mg 15mg 15mg 15mg   INR 3.1; 3.0 2.0 3.1 2.8 3.7 2.3 2.7 2.7 2.5 3.1 2.5   Notes Clinic;     ill      Inc GLV     Date 8/2 8/9 8/16 8/23 8/30         Total Weekly Dose 15mg 16mg 15mg 15 mg 14mg         INR 1.9 2.0 2.6 3.5          Notes Incr GLV boost              Phone Interview:  Tablet Strength: 2mg tablets  Current Maintenance Dose: 2mg every day except 3mg on Tuesday  Patient Contact Info: [phone] (Nazia Britton, daughter)  Lab Contact Info: mdINR    Patient Findings   Positives:   Change in health, Emergency department visit   Negatives:   Signs/symptoms of thrombosis, Signs/symptoms of bleeding, Laboratory test error suspected, Change in alcohol use, Change in activity, Upcoming invasive procedure, Upcoming dental procedure, Missed doses, Extra doses,  Change in medications, Change in diet/appetite, Hospital admission, Bruising, Other complaints   Comments:   Per Ms. Judge daughter, Nazia, she has been sick this past week and is not eating much. They went to ER on Monday but no medication changes were made. She has no appetite but Nazia has been giving her Ensure. However, the ensure seems to take the appetite down more. She has been trying to eat green beans but is not able to eat many. She is seeing her family doctor tomorrow in hopes to find the reason for the illness. Nazia will call if any medication changes are made or procedures are planned. Nazia denied any signs of bleeding, missed doses, or extra doses.      Plan:  1. INR is SUPRAtherapeutic today at 3.5. Instructed pt's daughter, Nazia, to have Ms. Judge DECREASE tonight's warfarin to 1 mg then continue with warfarin 2mg daily except 3mg Tuesday. (6.7% decrease in weekly dose).   2. Repeat INR in 1 week, 8/30, and call the Anticoagulation Clinic if there are any signs of bleeding.  3. Verbal information provided over the phone to patient's daughter. She RBV dosing instructions, expresses understanding with teach back, and has no further questions at this time.     Dunia Hahn, Pharmacy Intern  8/23/2018  9:03 AM     [facility]yler [facility], have reviewed the note in full and agree with the assessment and plan.  08/23/18  9:29 AM

## 2018-08-28 NOTE — TELEPHONE ENCOUNTER
Called and left message regarding tomorrow is her scheduled date to send in a pacemaker reading.  Left my name and number in case he has questions.    8/30/18- daughter called and unable to get monitor to work.  Sending new monitor and they will call when they receive it for assistance to send in a reading.

## 2018-08-30 NOTE — PROGRESS NOTES
Anticoagulation Clinic - Remote Progress Note  [facility] HOME MONITOR  Frequency of Monitoring: weekly, every Thursday    Indication: afib  Referring Provider: Cyndi  Initial Warfarin Start Date: 15 years ago  Goal INR: 2 - 3  Current Drug Interactions: aspirin, levothyroxine, ropinirole  CHADS-VASc: 5 (age, gender, HTN, CHF)    Diet: frequent, consistent. Peas, green beans 2-3x/week   Alcohol: none  Tobacco: none  OTC Pain Medication: Excedrin (meloxicam)    1st clinic visit: 10/18/17    INR History:  Date 12/20 12/27 1/3/18 1/8 1/15 1/24 1/31 2/7 2/14 2/21 2/28 3/7   Total Weekly Dose 10mg 9mg 12mg 13mg 14mg 14mg 14mg 14mg 14mg 14mg 14mg 14mg   INR 2.2 1.7 1.7 1.9 2.2 2.6 2.7 2.4 2.9 2.5 1.9 2.0   Notes amio stop 12/14 1 missed dose boost              Date 3/14 3/21 3/28 4/4 4/11 4/16 4/18 4/23 4/25 5/1 5/4 5/10 5/17   Total Weekly Dose 14mg 14mg 13mg 13mg 12mg 14mg 15mg 14mg 15mg 17mg 17mg 16mg 16mg   INR 3.4 3.3 2.9 3.8 1.2 1.3 1.5 1.3 1.4 1.8 2.7 2.4 2.4   Notes recom GLV decr dose   incr. GLV (must greens)  boost  boost         Date 5/24 5/31 6/7 6/14 6/21 6/25 6/29 7/4 7/12 7/19 7/26   Total Weekly Dose 16mg 16mg 16mg 16mg 16mg 14mg 15mg 15mg 15mg 15mg 15mg   INR 3.1; 3.0 2.0 3.1 2.8 3.7 2.3 2.7 2.7 2.5 3.1 2.5   Notes Clinic;     ill      Inc GLV     Date 8/2 8/9 8/16 8/23 8/30        Total Weekly Dose 15mg 16mg 15mg 15mg 14mg        INR 1.9 2.0 2.6 3.5 2.4        Notes Incr GLV boost  ill Augment start 8/29          Phone Interview:  Tablet Strength: 2mg tablets  Current Maintenance Dose: 2mg every day except 3mg on Tuesday  Patient Contact Info: [phone] (Nazia Britton, daughter)  Lab Contact Info: Khai    Patient Findings   Positives:   Change in health, Change in medications, Hospital admission   Negatives:   Signs/symptoms of thrombosis, Signs/symptoms of bleeding, Laboratory test error suspected, Change in alcohol use, Change in activity, Upcoming invasive procedure, Emergency department  visit, Upcoming dental procedure, Missed doses, Extra doses, Change in diet/appetite, Bruising, Other complaints   Comments:   Daughter reports patient was taken to [facility] in [place], [place] on Mon, 8/27, for 'fluid on the lungs'. Patient was discharged on Wed, 8/29. Have called [facility] Medical Records to request more information.     On discharge, patient was given RX for Augmentin 875-125mg BID x5 days. Daughter also reports patient has gain ~7lbs in the past week     Plan:  1. INR is therapeutic and back WNL today at 2.4. Instructed pt's daughter, Nazia, to have Ms. Judge continue warfarin 2mg daily except 3mg Tuesday   2. Repeat INR on Tues, 9/4 to make sure patient stays WNL due to initiation of abx post-hospital admission  3. Verbal information provided over the phone to patient's daughter. She RBV dosing instructions, expresses understanding with teach back, and has no further questions at this time.     Dean Salcedo CPhT  8/30/2018  8:40 AM     IArabella, PharmD, have reviewed the note in full and agree with the assessment and plan.  The clinic closed on Monday for holiday.  08/30/18  10:50 AM

## 2018-09-04 NOTE — PROGRESS NOTES
Anticoagulation Clinic - Remote Progress Note  ALERE HOME MONITOR  Frequency of Monitoring: weekly, every Thursday    Indication: afib  Referring Provider: Cyndi  Initial Warfarin Start Date: 15 years ago  Goal INR: 2 - 3  Current Drug Interactions: aspirin, levothyroxine, ropinirole  CHADS-VASc: 5 (age, gender, HTN, CHF)    Diet: frequent, consistent. Peas, green beans 2-3x/week   Alcohol: none  Tobacco: none  OTC Pain Medication: Excedrin (meloxicam)    1st clinic visit: 10/18/17    INR History:  Date 12/20 12/27 1/3/18 1/8 1/15 1/24 1/31 2/7 2/14 2/21 2/28 3/7   Total Weekly Dose 10mg 9mg 12mg 13mg 14mg 14mg 14mg 14mg 14mg 14mg 14mg 14mg   INR 2.2 1.7 1.7 1.9 2.2 2.6 2.7 2.4 2.9 2.5 1.9 2.0   Notes amio stop 12/14 1 missed dose boost              Date 3/14 3/21 3/28 4/4 4/11 4/16 4/18 4/23 4/25 5/1 5/4 5/10 5/17   Total Weekly Dose 14mg 14mg 13mg 13mg 12mg 14mg 15mg 14mg 15mg 17mg 17mg 16mg 16mg   INR 3.4 3.3 2.9 3.8 1.2 1.3 1.5 1.3 1.4 1.8 2.7 2.4 2.4   Notes recom GLV decr dose   incr. GLV (must greens)  boost  boost         Date 5/24 5/31 6/7 6/14 6/21 6/25 6/29 7/4 7/12 7/19 7/26   Total Weekly Dose 16mg 16mg 16mg 16mg 16mg 14mg 15mg 15mg 15mg 15mg 15mg   INR 3.1; 3.0 2.0 3.1 2.8 3.7 2.3 2.7 2.7 2.5 3.1 2.5   Notes Clinic; HM    ill      Inc GLV     Date 8/2 8/9 8/16 8/23 8/30 9/4       Total Weekly Dose 15mg 16mg 15mg 15mg 14mg 15mg       INR 1.9 2.0 2.6 3.5 2.4 3.8       Notes Incr GLV boost  ill Augment start 8/29          Phone Interview:  Tablet Strength: 2mg tablets  Current Maintenance Dose: 2mg every day except 3mg on Tuesday  Patient Contact Info: [phone] (Nazia Britton, daughter)  Lab Contact Info: Gire    Patient Findings   Negatives:   Signs/symptoms of thrombosis, Signs/symptoms of bleeding, Laboratory test error suspected, Change in health, Change in alcohol use, Change in activity, Upcoming invasive procedure, Emergency department visit, Upcoming dental procedure, Missed doses,  Extra doses, Change in medications, Change in diet/appetite, Hospital admission, Bruising, Other complaints   Comments:   Daughter reports patient was taken to [facility] in [place], [place] on Mon, 8/27, for 'fluid on the lungs'. She was admitted for CHF. On discharge, patient was given RX for Augmentin 875-125mg BID x5 days which was completed yesterday. Daughter also reports she 102 lbs (lost three pounds yesterday). She reports that she does have some issues swallowing but she is still eating and eating boost drinks.     Plan:  1. INR is SUPRAtherapeutic at 3.8 post antibiotic use. Instructed pt's daughter, Nazia, to have Ms. Judge decrease dose today to 2mg and continue warfarin 2mg daily except 3mg Tuesday. Also encouraged a serving of green beans or extra boost drink today.  2. Repeat INR on Thur, 9/6 to ensure back WNL.   3. Verbal information provided over the phone to patient's daughter. She RBV dosing instructions, expresses understanding with teach back, and has no further questions at this time.     Lita Pascual, Pharm,D  9/4/2018  9:24 AM

## 2018-09-06 NOTE — PROGRESS NOTES
Anticoagulation Clinic - Remote Progress Note  ALERE HOME MONITOR  Frequency of Monitoring: weekly, every Thursday    Indication: afib  Referring Provider: Cyndi  Initial Warfarin Start Date: 15 years ago  Goal INR: 2 - 3  Current Drug Interactions: aspirin, levothyroxine, ropinirole  CHADS-VASc: 5 (age, gender, HTN, CHF)    Diet: frequent, consistent. Peas, green beans 2-3x/week   Alcohol: none  Tobacco: none  OTC Pain Medication: Excedrin (meloxicam)    1st clinic visit: 10/18/17    INR History:  Date 12/20 12/27 1/3/18 1/8 1/15 1/24 1/31 2/7 2/14 2/21 2/28 3/7   Total Weekly Dose 10mg 9mg 12mg 13mg 14mg 14mg 14mg 14mg 14mg 14mg 14mg 14mg   INR 2.2 1.7 1.7 1.9 2.2 2.6 2.7 2.4 2.9 2.5 1.9 2.0   Notes amio stop 12/14 1 missed dose boost              Date 3/14 3/21 3/28 4/4 4/11 4/16 4/18 4/23 4/25 5/1 5/4 5/10 5/17   Total Weekly Dose 14mg 14mg 13mg 13mg 12mg 14mg 15mg 14mg 15mg 17mg 17mg 16mg 16mg   INR 3.4 3.3 2.9 3.8 1.2 1.3 1.5 1.3 1.4 1.8 2.7 2.4 2.4   Notes recom GLV decr dose   incr. GLV (must greens)  boost  boost         Date 5/24 5/31 6/7 6/14 6/21 6/25 6/29 7/4 7/12 7/19 7/26   Total Weekly Dose 16mg 16mg 16mg 16mg 16mg 14mg 15mg 15mg 15mg 15mg 15mg   INR 3.1; 3.0 2.0 3.1 2.8 3.7 2.3 2.7 2.7 2.5 3.1 2.5   Notes Clinic; HM    ill      Inc GLV     Date 8/2 8/9 8/16 8/23 8/30 9/4 9/6      Total Weekly Dose 15mg 16mg 15mg 15mg 14mg 15mg 14 mg      INR 1.9 2.0 2.6 3.5 2.4 3.8 3.7      Notes Incr GLV boost  ill Augment start 8/29          Phone Interview:  Tablet Strength: 2mg tablets  Current Maintenance Dose: 2mg every day except 3mg on Tuesday  Patient Contact Info: [phone] (Nazia Britton, daughter)  Lab Contact Info: Khai    Patient Findings:  Positives:   Change in health, Change in medications   Negatives:   Signs/symptoms of thrombosis, Signs/symptoms of bleeding, Laboratory test error suspected, Change in alcohol use, Change in activity, Upcoming invasive procedure, Emergency department  visit, Upcoming dental procedure, Missed doses, Extra doses, Change in diet/appetite, Hospital admission, Bruising, Other complaints   Comments:   Patient's caregiver states that the patient continues to be ill lately, and just finished Augmentin therapy. She states the patient has not had much of an appetite, but she will try and give Ms. Judge some green beans tonight.     Plan:   1. INR is SUPRAtherapeutic at 3.7 post antibiotic use. Instructed pt's daughter, Nazia, to have Ms. Judge decrease tonight's warfarin dose to 1mg, then continue 2mg daily until INR recheck on Monday (6.7% decrease).  Also encouraged a serving of green beans or extra boost drink today.  2. Repeat INR Monday, 9/10.   3. Verbal information provided over the phone to patient's daughter. She RBV dosing instructions, expresses understanding with teach back, and has no further questions at this time.     Madhuri Ellis, PharmD  9/6/2018  2:09 PM

## 2018-09-06 NOTE — TELEPHONE ENCOUNTER
Pt has been in [facility]- they told her that PT now has Pulmonary HTN- [facility] reviewed the echo and she recommends RHC for definitive diagnosis- I relayed to pt's daughter, jb and she verbalized understanding- order placed

## 2018-09-07 PROBLEM — R93.1 ABNORMAL ECHOCARDIOGRAM: Status: ACTIVE | Noted: 2018-01-01

## 2018-09-07 PROBLEM — R06.02 SHORTNESS OF BREATH: Status: ACTIVE | Noted: 2018-01-01

## 2018-09-07 NOTE — PROGRESS NOTES
Mrs. Judge' daughter, Nazia, called to report her mother has been scheduled for a RHC on Friday 9/14. She has been instructed to stop her warfarin on Saturday 9/8. Provided a tentative plan for warfarin re-initiation -- take 3mg Friday 9/14 and Saturday 9/15, then 2mg daily thereafter. Repeat INR on Thursday 9/20.    Ann Cowden Mayer, PharmD  9/7/2018  12:41 PM

## 2018-09-10 NOTE — TELEPHONE ENCOUNTER
Daughter called and she has received new [employer] monitor for Mrs Judge pacemaker.  At this time unable to get signal to send in pacemaker reading.  She will try another day.

## 2018-09-13 NOTE — DISCHARGE INSTRUCTIONS

## 2018-09-13 NOTE — PAT
Patient states she was recently treated for pneumonia at [facility] and Dr. Hanna is aware.

## 2018-09-14 NOTE — H&P
[facility] Consult Note      Referring Provider: Natasha Hanna,*  Primary Provider:  Lubna Robertson MD  Reason for Consultation: shortness of breath    Patient Care Team:  Lubna Robertson MD as PCP - General (Internal Medicine)  Eugene Langston MD as Consulting Physician (Internal Medicine)    Chief complaint:  Shortness of breath    Identification:  88-year-old  female    Problem list:    1. Atrial fibrillation/sick sinus syndrome:  a. 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  b. Status post dual-chamber Medtronic N Rhythm pacemaker, 09/08/2005 (implanted on the right side).  c. Rythmol therapy since, 09/08/2005.  d. Chronic anticoagulation with Coumadin with a CHADS score = 4.  e. Normal LV function and left atrial dimension by echocardiogram, July 2005.  f. Negative exercise Cardiolite, January 2008; negative adenosine Cardiolite, June 2005; negative stress echocardiogram, April 2004.  g. First-degree AV block.  h. Generator change, September 2011, in [place], [place].  i. ECV 9/27/2017: Post cardioversion the patient displayed a sinus rhythm.  j. Recurrent atrial fibrillation September 27, 2017. Propafenone discontinued.  Amiodarone initiated.  k. Cardioversion, 9/27/2017: Post cardioversion the patient displayed a sinus rhythm.  l. Cardioversion, 11/10/2017: Post cardioversion the patient displayed a sinus rhythm.  m. AV node ablation, 12/14/2017: Successful electrical recordings of the His bundle, right ventricle, and high right atrium. Successful radiofrequency ablation of the AV node.  2. Valvular Heart Disease  a. Echocardiogram 4/15/17:  Moderate to severe MR and TR.  No MVP. Normal LV function.  b. KWADWO, 5/22/2017: EF 60%. Moderate to sever MR. 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 Mild AR. Mild TR.  c. MitraClip placed 9/12/17 (single)   d. Echo limited 9/13/2017: Trivial pericardial effusion. Normal LV systolic  function. Single MitraClip seen in position across MV leaflets.   3. Hypertension.  4. Hyperlipidemia.  5. Peripheral vascular disease  a. OMARI, 2/21/2018: The right OMARI is normal. Moderate digital ischemia. The left OMARI is normal. Moderate digital ischemia. Normal right OMARI at 1.09, normal left OMARI at 1.05.  6. Dyspnea on exertion  a. Cardiac catheterization, 5/22/2017: Near normal coronary arteries. Successful PTCA and stent placement in the ostial right iliac using a 8 x 17 express LD biliary stent.  7. Hypothyroidism, on chronic replacement therapy.  8. History of breast cancer, status post left mastectomy, 1989.  9. Osteoporosis.  10. Migraine headaches.  11. History of bleeding ulcer 15-20 years ago.  12. Gastroesophageal reflux disease.  13. Restless legs syndrome.  14. History of Helicobacter pylori, 2011.  15. Surgical history:  a. Hysterectomy, 1968.  b. Left mastectomy, 1989.  c. Colon resection for diverticulosis, 08/17/2013, Dr. Guadarrama at [facility].  d. Cholecystectomy.    Allergies:  Doxycycline; Fosamax [alendronate]; Levofloxacin; and Metronidazole    Home/Current Medications:    Aspirin 81 mg daily  Bumex 1 mg, 2 tablets daily  Calcium 600+ D twice a day  Vitamin D3 1000 units daily  Flonase 50 mics 2 sprays daily  Norco 5-325 mg half tablet 4 times a day  Levothyroxine 150 mics daily  Meloxicam 7.5 mg daily  Metoprolol succinate XL 25 mg half tablet daily  Multivitamin daily  Pamelor 25 mg daily at bedtime  Oxygen 2 L when necessary (using it more often than not)  Prilosec 40 mg daily  Creon 24,000 units 3 times a day  Potassium chloride 10 mEq daily  Pravachol 40 mg daily at bedtime  Requip 1 mg 3 times a day juan necessary  Kenalog 0.1% cream weekly  Vitamin B12 1000 mcg daily  Coumadin as directed      History of present illness:    Patient is a very pleasant 88-year-old female with the above-noted medical history who presents today for her right sided heart catheterization for further  evaluation of pulmonary hypertension that was diagnosed on an echocardiogram and an outlying facility.  She recently had been admitted at [facility] in the end of August for worsening shortness of breath.  nd echocardiogram was performed indicating pulmonary hypertension.  The images have been reviewed by Dr. Hanna and she felt that her right cardiac catheterization was appropriate to further assess.  She states that she typically has felt her baseline up until that time and had no cardiovascular complaints.  She states that when she is lying flat, she will noticed random sharp fleeting pains that go across her chest but they did not occur on a regular basis.  She states that they only last a couple of seconds and are self-limiting.  Her daughter indicates that her rate has remained in rhythm.  .  Her daughter states that she has been given a pulmonologist to follow with but cannot get in to see him in [place] until January 2019.  She would like to possibly get established with a pulmonologist here, much sooner, if at all possible      Cardiac Risk Factors:  Hypertension, hyperlipidemia, advanced age female    Previous cardiac catheterization:  5/22/2017  Details:  Near normal CORS.  Successful PTCA and PCI in ostial right iliac 8 x 17 express LD biliary stent     Social History:  Social History     Social History   • Marital status:      Spouse name: N/A   • Number of children: 1   • Years of education: N/A     Occupational History   • [employer] Retired     Social History Main Topics   • Smoking status: Never Smoker   • Smokeless tobacco: Never Used   • Alcohol use No   • Drug use: No   • Sexual activity: Defer     Other Topics Concern   • Not on file     Social History Narrative    Lives in [place], [place] with daughter     Family History:  Family History   Problem Relation Age of Onset   • No Known Problems Mother    • Coronary artery disease Father    •  Stroke Father    • Diabetes Sister    • Coronary artery disease Sister    • Lung cancer Brother      Review of Systems  Pertinent positives are listed in the HPI.  All other systems reviewed are negative.         Objective     Vital Sign Min/Max for last 24 hours  Temp  Min: 97 °F (36.1 °C)  Max: 97 °F (36.1 °C)   BP  Min: 140/78  Max: 140/78   Pulse  Min: 85  Max: 85   Resp  Min: 16  Max: 16   SpO2  Min: 97 %  Max: 97 %   No Data Recorded   No Data Recorded         Physical Exam:    GENERAL: well-developed, well-nourished; in no acute distress.   NECK:  Carotid upstrokes are 2+ and  symmetrical without bruits.   LUNGS: Clear, diminished to auscultation bilaterally without wheezing, rhonchi, or rales noted.   CARDIOVASCULAR: The heart has a regular rate with a normal S1 and S2. There is no murmur, gallop, rub, or click appreciated. The PMI is nondisplaced.   ABDOMEN: Soft and nontender  NEUROLOGICAL: Nonfocal; Alert and oriented  PERIPHERAL VASCULAR:  Posterior tibial pulses are 2+ and symmetrical. There is trace peripheral edema.   SKIN:  Warm and dry  PSYCHIATRIC: normal mood and affect; behavior appropriate       EKG:   NSR    Echocardiogram: recent at [facility] indicating pulmonary hypertension.  Cyndi has reviewed the images on file.     Labs:        Results from last 7 days  Lab Units 09/13/18  1243   SODIUM mmol/L 139   POTASSIUM mmol/L 3.9   CHLORIDE mmol/L 95*   CO2 mmol/L 39.0*   BUN mg/dL 21   CREATININE mg/dL 0.82   CALCIUM mg/dL 9.0   BILIRUBIN mg/dL 0.6   ALK PHOS U/L 131*   ALT (SGPT) U/L 20   AST (SGOT) U/L 37*   GLUCOSE mg/dL 90     Lab Results   Component Value Date    WBC 5.49 09/13/2018    HGB 8.4 (L) 09/13/2018    HCT 28.8 (L) 09/13/2018    MCV 85.2 09/13/2018     09/13/2018     Lab Results   Component Value Date    CHOL 144 06/12/2017    TRIG 87 06/12/2017    HDL 60 06/12/2017    LDL 67 06/12/2017        Lab Results   Component Value Date    INR 3.70 09/06/2018    INR 3.80  09/04/2018    INR 2.40 08/30/2018    PROTIME 36.9 (H) 05/24/2018    PROTIME 20.4 (H) 12/11/2017    PROTIME 24.6 (H) 11/10/2017       Ejection Fraction:  60% per echo 2017      Results Review:  I reviewed the patients new clinical results.      Assessment:  1.  Dyspnea on exertion  e. Cardiac catheterization, 5/22/2017: Near normal coronary arteries. Successful PTCA and stent placement in the ostial right iliac using a 8 x 17 express LD biliary stent.  2.  A-fib/ SSS  e. 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  f. Status post dual-chamber Medtronic N Rhythm pacemaker, 09/08/2005 (implanted on the right side).  g. Rythmol therapy since, 09/08/2005.  h. Chronic anticoagulation with Coumadin with a CHADS score = 4.  i. Normal LV function and left atrial dimension by echocardiogram, July 2005.  j. Negative exercise Cardiolite, January 2008; negative adenosine Cardiolite, June 2005; negative stress echocardiogram, April 2004.  k. First-degree AV block.  l. Generator change, September 2011, in [place], [place].  m. ECV 9/27/2017: Post cardioversion the patient displayed a sinus rhythm.  n. Recurrent atrial fibrillation September 27, 2017. Propafenone discontinued.  Amiodarone initiated.  o. Cardioversion, 9/27/2017: Post cardioversion the patient displayed a sinus rhythm.  p. Cardioversion, 11/10/2017: Post cardioversion the patient displayed a sinus rhythm.  q. AV node ablation, 12/14/2017: Successful electrical recordings of the His bundle, right ventricle, and high right atrium. Successful radiofrequency ablation of the AV node.  3. Valvular Heart Disease  a. Echocardiogram 4/15/17:  Moderate to severe MR and TR.  No MVP. Normal LV function.  b. KWADWO, 5/22/2017: EF 60%. Moderate to sever MR. 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 Mild AR. Mild TR.  c. MitraClip placed 9/12/17 (single)   d. Echo limited 9/13/2017: Trivial pericardial effusion. Normal LV systolic function.  Single MitraClip seen in position across MV leaflets.   4.  Hypertension.  5.  Hyperlipidemia.  6.  Peripheral vascular disease  1. OMARI, 2/21/2018: The right OMARI is normal. Moderate digital ischemia. The left OMARI is normal. Moderate digital ischemia. Normal right OMARI at 1.09, normal left OMARI at 1.05.  7.  Hypothyroidism, on chronic replacement therapy.      Plan:  Right cardiac catheterization today to further assess shortness of breath and pulmonary hypertension with Dr. Natasha Hanna.  The risks, benefits, potential complications of all been discussed with the patient and she is agreeable to proceed.    I discussed the patients findings and my recommendations with patient.    Scribed for Natasha Hanna MD by LUCI Reich on 09/07/2018 at 8:58 AM      LUCI Mcguire  09/14/18  9:30 AM    I Natasha Hanna MD personally performed the services described in this documentation as scribed by the above individual in my presence, and it is both accurate and complete.    Natasha Hanna MD, FACC

## 2018-09-14 NOTE — NURSING NOTE
Procedure complete.  Pt tolerated well.  1350 ml fluid removed.    Chest tube in place. Report called to Priscilla in 6A.

## 2018-09-15 PROBLEM — I27.29 OTHER SECONDARY PULMONARY HYPERTENSION (HCC): Status: ACTIVE | Noted: 2018-01-01

## 2018-09-15 PROBLEM — I05.2 MITRAL VALVE STENOSIS AND REGURGITATION: Status: ACTIVE | Noted: 2018-01-01

## 2018-09-15 NOTE — PLAN OF CARE
Problem: Patient Care Overview  Goal: Plan of Care Review  Outcome: Ongoing (interventions implemented as appropriate)   09/15/18 7027   Coping/Psychosocial   Plan of Care Reviewed With patient;daughter   Plan of Care Review   Progress improving   OTHER   Outcome Summary VSS. Paced on monitor. right Chest tube to 20 cm h2o suction. Pt c/o leg pain, prn meds given. Pt has been sitting up in the chair most of this shift. no other complains reported.

## 2018-09-15 NOTE — PROGRESS NOTES
Clinical Nutrition   Reason For Visit: Identified at risk by screening criteria, Malnutrition Severity Assessment    Patient Name: Marilee Judge  YOB: 1930  MRN: [id number]  Date of Encounter: 09/15/18 4:23 PM  Admission date: 9/14/2018    Pt meets criteria for severe chronic malnutrition based on significant  muscle wasting, fat loss      Nutrition Assessment     Hospital Problem List  Active Problems:    Shortness of breath    Abnormal echocardiogram          PMH: She  has a past medical history of Acute pericardial effusion (09/12/2017); Anxiety; Arthritis; Asthma; Atrial fibrillation (CMS/[facility]) (5/9/2017); Atrial thrombus (06/2017); Bleeding ulcer; Breast cancer (CMS/[facility]); Chronic anticoagulation; Chronic diastolic CHF (congestive heart failure) (CMS/[facility]); Diverticula of colon; Dyspnea on exertion (5/15/2017); First degree AV block; GERD (gastroesophageal reflux disease) (5/9/2017); H. pylori infection (2011); Hyperlipidemia (5/9/2017); Hypertension (5/9/2017); Hypothyroidism (5/9/2017); Migraine (5/9/2017); Osteoporosis (5/9/2017); Oxygen dependent; Pacemaker; Pneumonia; PVD (peripheral vascular disease) (CMS/[facility]); Respiratory failure, post-operative (CMS/[facility]) (09/12/2017); RLS (restless legs syndrome) (5/9/2017); SSS (sick sinus syndrome) (CMS/HCC); Valvular heart disease (05/15/2017); Wears dentures; and Wears eyeglasses.   PSxH: She  has a past surgical history that includes Pacemaker Implantation (09/08/2005); Mastectomy (Left, 1989); Hysterectomy (1968); Colectomy (08/17/2013); Cholecystectomy; Shoulder arthroscopy (Left); Cardiac catheterization (N/A, 5/22/2017); Cardiac catheterization (N/A, 6/13/2017); Cardiac catheterization (N/A, 9/12/2017); Pericardiocentesis (09/12/2017); Mitral valve surgery; Breast surgery (Left); Eye surgery (Bilateral); Teeth Extraction; Appendectomy; Cardiac electrophysiology procedure (N/A, 12/14/2017); and Colonoscopy.           Applicable medical  tests/procedures since admission: s/p R. Thoracentesis 9-14-18       SLP Recommendation and Plan  SLP Swallowing Diagnosis: functional oral phase, suspected pharyngeal dysfunction, esophageal dysfunction  SLP Diet Recommendation: regular textures, thin liquids  Monitor for Signs of Aspiration: yes  Recommended Diagnostics: VFSS ([facility]BS)  Swallow Criteria for Skilled Therapeutic Interventions Met: demonstrates skilled criteria  Anticipated Dischage Disposition: home with assist  Rehab Potential/Prognosis, Swallowing: adequate, monitor progress closely      Reported/Observed/Food/Nutrition Related History     Pt cachectic, sitting up in chair, observe marked temporal wasting, prominent acromion process, protruding scapula    Pt reports good appetite at present,  States her weight goes up and down, used to weigh ~ 115-117lb prior to colon surgery, has had difficulty swallowing, gets choked on solid foods + liquids    Daughter reports pt has dropped down to 95lb's recently, appetite is not consistent, did have an esophageal dilatation several years ago    Anthropometrics   Height: 66in  Weight: 105lb  BMI: 16.9  BMI classification: Underweight:<18.5kg/m2         Weight change: wt has fluctuated past year between ~ 105lb---126lb  Suspect higher weights related to fluid gain/ edema    Last 15 Recorded Weights   View Complete Flowsheet   Weight Weight (kg) Weight (lbs) Weight Method VISIT REPORT   9/14/2018 48 kg 105 lb 13.1 oz Standing scale -   5/24/2018 47.809 kg 105 lb 6.4 oz - Report   2/21/2018 49.896 kg 110 lb - Report   12/14/2017 57.2 kg 126 lb 1.7 oz Standing scale -   11/10/2017 52.3 kg 115 lb 4.8 oz Standing scale -   10/18/2017 51.438 kg 113 lb 6.4 oz - Report   9/24/2017 51.427 kg 113 lb 6 oz Bed scale -   9/13/2017 48.081 kg 106 lb - -   9/12/2017 48.5 kg 106 lb 14.8 oz Standing scale Report   8/30/2017 48.807 kg 107 lb 9.6 oz - Report   6/13/2017 48.7 kg 107 lb 5.8 oz Standing scale -   5/30/2017 49.079 kg 108  lb 3.2 oz - Report   5/22/2017 49.3 kg 108 lb 11 oz           Nutrition Focused Physical Exam         Subcutaneous fat:  Orbital Severe   Buccal Severe   Tricep Severe   Ribs- thoracic and lumbar region, lower back, midaxillary line Severe     Muscle:  Temple/temporalis Severe   Clavicle/acromion- pectoralis, deltoid Severe   Shoulder- deltoid Severe   Scapula- trapezius, latissimus dorsi Severe   Interosseous- dorsal hand Severe   Thigh- quadriceps Severe   Calf- gastrocnemius Unable to determine at this time     Edema:  appears to have edema in calves    Decline in functional status: Yes    Pt reports 2 falls recently    Daughter reports she is too weak to do home PT          Labs reviewed   Labs reviewed: Yes    Results from last 7 days  Lab Units 09/15/18  0810 09/14/18  0926 09/13/18  1243   SODIUM mmol/L 143  --  139   POTASSIUM mmol/L 4.1  --  3.9   CHLORIDE mmol/L 101  --  95*   CO2 mmol/L 34.0*  --  39.0*   BUN mg/dL 20  --  21   CREATININE mg/dL 0.73  --  0.82   GLUCOSE mg/dL 100  --  90   CALCIUM mg/dL 8.7  --  9.0   CHOLESTEROL mg/dL  --  100  --    TRIGLYCERIDES mg/dL  --  67  --        Results from last 7 days  Lab Units 09/15/18  0810 09/14/18  0926 09/13/18  1243   WBC 10*3/mm3 5.42  --  5.49   ALBUMIN g/dL 3.16*  --  3.51   CHOLESTEROL mg/dL  --  100  --    TRIGLYCERIDES mg/dL  --  67  --            Lab Results  Lab Value Date/Time   HGBA1C 6.40 (H) 09/13/2018 1243   HGBA1C 5.70 (H) 12/11/2017 1113     Medications reviewed   Medications reviewed: Yes  Pertinent:bumex, abx, creon, B-12, Vitamin D3    Intake/Ouptut 24 hrs (7:00AM - 6:59 AM)     Intake & Output (last day)       09/14 0701 - 09/15 0700 09/15 0701 - 09/16 0700    P.O. 360 600    Total Intake(mL/kg) 360 (7.5) 600 (12.5)    Urine (mL/kg/hr) 200 550 (1.2)    Chest Tube 2200     Total Output 2400 550    Net -2040 +50          Unmeasured Stool Occurrence 2 x 3 x            Current Nutrition Prescription   PO: Diet Soft Texture; Chopped;  Cardiac  Oral Nutrition Supplement: Boost  3x/day      Evaluation of Received Nutrient/Fluid Intake:925 of 3 meals        Nutrition Diagnosis     Problem Malnutrition    Etiology Per Clinical Status   Signs/Symptoms Severe Muscle Wasting / Severe Fat Loss       Problem Swallowing difficulty   Etiology ? Esophageal Dysphagia   Signs/Symptoms Reported difficulty swallowing, SLP to f/u with MBS       Intervention   Intervention: Follow treatment progress, Interview for preferences, Menu adjusted, Adjusted supplement    Boost GC 3x/day    Goal:   General: Nutrition support treatment  PO: Maintain intake      Monitoring/Evaluation:       Monitoring/Evaluation: Per protocol  Georgette Anderson RD  Time Spent: 60min

## 2018-09-15 NOTE — THERAPY EVALUATION
Acute Care - Speech Language Pathology   Swallow Initial Evaluation [facility]   Clinical Swallow Evaluation       Patient Name: Marilee Judge  : 6/3/1930  MRN: [id number]  Today's Date: 9/15/2018               Admit Date: 2018    Visit Dx:     ICD-10-CM ICD-9-CM   1. Shortness of breath R06.02 786.05   2. Abnormal echocardiogram R93.1 793.2     Patient Active Problem List   Diagnosis   • Atrial fibrillation (CMS/HCC)   • Hypertension   • Hyperlipidemia   • Hypothyroidism   • History of breast cancer   • Osteoporosis   • Migraine   • GERD (gastroesophageal reflux disease)   • RLS (restless legs syndrome)   • Dyspnea on exertion   • Non-rheumatic mitral regurgitation, sp mitral clip procedure 17   • Acute pericardial effusion   • Respiratory failure, post-operative (CMS/HCC)   • Peripheral vascular disease (CMS/HCC)   • Acute on chronic diastolic heart failure (CMS/HCC)   • Bilateral pleural effusion   • Bilateral Pleural effusion   • Persistent atrial fibrillation (CMS/HCC)   • Shortness of breath   • Abnormal echocardiogram     Past Medical History:   Diagnosis Date   • Acute pericardial effusion 2017   • Anxiety    • Arthritis    • Asthma    • Atrial fibrillation (CMS/HCC) 2017   • Atrial thrombus 2017    R atrial lead   • Bleeding ulcer     History of bleeding ulcer 15-20 years ago.   • Breast cancer (CMS/HCC)     left    • Chronic anticoagulation    • Chronic diastolic CHF (congestive heart failure) (CMS/HCC)    • Diverticula of colon    • Dyspnea on exertion 5/15/2017   • First degree AV block    • GERD (gastroesophageal reflux disease) 2017   • H. pylori infection    • Hyperlipidemia 2017   • Hypertension 2017   • Hypothyroidism 2017   • Migraine 2017   • Osteoporosis 2017   • Oxygen dependent     2 L   • Pacemaker    • Pneumonia    • PVD (peripheral vascular disease) (CMS/HCC)    • Respiratory failure, post-operative (CMS/HCC) 2017   • RLS  (restless legs syndrome) 5/9/2017   • SSS (sick sinus syndrome) (CMS/HCC)    • Valvular heart disease 05/15/2017    Mitral stenosis with regurgitation   • Wears dentures    • Wears eyeglasses      Past Surgical History:   Procedure Laterality Date   • APPENDECTOMY     • BREAST SURGERY Left     radical mastectomy left    • CARDIAC CATHETERIZATION N/A 5/22/2017    Procedure: Left Heart Cath;  Surgeon: Natasha Hanna MD;  Location:  [facility];  Service:    • CARDIAC CATHETERIZATION N/A 6/13/2017    for mitral clip but unable to perform due to atrial thrombus   • CARDIAC CATHETERIZATION N/A 9/12/2017    Procedure: Abigail Clip;  Surgeon: Natasha Hanna MD;  Location:  [facility];  Service:    • CARDIAC ELECTROPHYSIOLOGY PROCEDURE N/A 12/14/2017    Procedure: AV node ablation;  Surgeon: Avery Breen MD;  Location:  [facility];  Service:    • CHOLECYSTECTOMY     • COLON RESECTION  08/17/2013    Colon resection for diverticulosis   • COLONOSCOPY     • EYE SURGERY Bilateral     cataract   • HYSTERECTOMY  1968   • MASTECTOMY Left 1989   • MITRAL VALVE SURGERY      mitraclip   • PACEMAKER IMPLANTATION  09/08/2005    with generator change 09/2011   • PERICARDIOCENTESIS  09/12/2017   • SHOULDER ARTHROSCOPY Left    • TEETH EXTRACTION            SWALLOW EVALUATION (last 72 hours)      [facility] Adult Swallow Evaluation     Row Name 09/15/18 1000                   Rehab Evaluation    Document Type evaluation  -AV        Subjective Information no complaints  -AV        Patient Observations alert;cooperative  -AV        Patient/Family Observations daughter present  -AV        Patient Effort good  -AV           General Information    Patient Profile Reviewed yes  -AV        Pertinent History Of Current Problem esphageal dilation, lung difficulties, reflux, chest tube present on right, ? pneumathorax   -AV        Current Method of Nutrition regular textures;thin liquids   -AV        Precautions/Limitations, Vision WFL with corrective lenses  -AV        Precautions/Limitations, Hearing WFL;for purposes of eval  -AV        Prior Level of Function-Communication WFL  -AV        Prior Level of Function-Swallowing esophageal concerns  -AV        Plans/Goals Discussed with patient;family  -AV        Barriers to Rehab none identified  -AV        Patient's Goals for Discharge return to all previous roles/activities  -AV        Family Goals for Discharge patient able to return to all previous activities/roles  -AV           Pain Assessment    Additional Documentation Pain Scale: FACES Pre/Post-Treatment (Group)  -AV           Pain Scale: FACES Pre/Post-Treatment    Pain: FACES Scale, Pretreatment 0-->no hurt  -AV        Pain: FACES Scale, Post-Treatment 0-->no hurt  -AV           Oral Motor and Function    Dentition Assessment upper dentures/partial in place;lower dentures/partial in place  -AV        Secretion Management WNL/WFL  -AV        Mucosal Quality moist, healthy  -AV        Volitional Swallow WFL  -AV        Volitional Cough WFL  -AV           Oral Musculature and Cranial Nerve Assessment    Oral Motor General Assessment WFL  -AV           General Eating/Swallowing Observations    Respiratory Support Currently in Use nasal cannula  -AV        Eating/Swallowing Skills self-fed  -AV        Positioning During Eating upright 90 degree;upright in chair  -AV        Utensils Used spoon;cup;straw  -AV        Consistencies Trialed regular textures;pureed;thin liquids  -AV           Clinical Swallow Eval    Oral Prep Phase WFL  -AV        Oral Transit WFL  -AV        Oral Residue WFL  -AV        Pharyngeal Phase suspected pharyngeal impairment  -AV        Esophageal Phase suspected esophageal impairment  -AV        Clinical Swallow Evaluation Summary Bedside swallow eval this am:  patient reports history of esophageal difficulites.  c.o food sticking. DAughter reports trouble with liquids and  solids at home.  no overt s/s with trials at bedside.  Rec MBS and limited UGI monday to further assess. patient and family in agreement.   -AV           Clinical Impression    SLP Swallowing Diagnosis functional oral phase;suspected pharyngeal dysfunction;esophageal dysfunction  -AV        Functional Impact risk of aspiration/pneumonia  -AV        Rehab Potential/Prognosis, Swallowing adequate, monitor progress closely  -AV        Swallow Criteria for Skilled Therapeutic Interventions Met demonstrates skilled criteria  -AV           Recommendations    SLP Diet Recommendation regular textures;thin liquids  -AV        Recommended Diagnostics VFSS (MBS)  -AV        SLP Rec. for Method of Medication Administration meds whole;with pudding or applesauce  -AV        Monitor for Signs of Aspiration yes  -AV        Anticipated Dischage Disposition home with assist  -AV          User Key  (r) = Recorded By, (t) = Taken By, (c) = Cosigned By    Initials Name Effective Dates    Norma Mobley, MS CCC-SLP 04/03/18 -         EDUCATION  The patient has been educated in the following areas:   Dysphagia (Swallowing Impairment) Oral Care/Hydration.    SLP Recommendation and Plan  SLP Swallowing Diagnosis: functional oral phase, suspected pharyngeal dysfunction, esophageal dysfunction  SLP Diet Recommendation: regular textures, thin liquids        Monitor for Signs of Aspiration: yes  Recommended Diagnostics: VFSS (MBS)  Swallow Criteria for Skilled Therapeutic Interventions Met: demonstrates skilled criteria  Anticipated Dischage Disposition: home with assist  Rehab Potential/Prognosis, Swallowing: adequate, monitor progress closely             Plan of Care Reviewed With: patient, daughter  Plan of Care Review  Plan of Care Reviewed With: patient, daughter  Progress:  (initial eval )             Time Calculation:         Time Calculation- SLP     Row Name 09/15/18 4509             Time Calculation- SLP    SLP Start Time 1000   -AV      SLP Received On 09/15/18  -AV        User Key  (r) = Recorded By, (t) = Taken By, (c) = Cosigned By    Initials Name Provider Type    Norma Mobley, MS CCC-SLP Speech and Language Pathologist          Therapy Charges for Today     Code Description Service Date Service Provider Modifiers Qty    [id number]  ST EVAL ORAL PHARYNG SWALLOW 3 9/15/2018 Norma Stuart, MS HAYWARD-SLP GN 1               Norma Stuart MS CCC-SLP  9/15/2018

## 2018-09-15 NOTE — PROGRESS NOTES
Malnutrition Severity Assessment    Patient Name:  Marilee Judge  YOB: 1930  MRN: [id number]  Admit Date:  9/14/2018    Patient meets criteria for : Severe malnutrition     Pt meets criteria for severe chronic malnutrition based on severe muscle wasting, fat loss      Malnutrition Type: Chronic Illness Malnutrition     Malnutrition Type (last 8 hours)      Malnutrition Severity Assessment     Row Name 09/15/18 1646       Malnutrition Severity Assessment    Malnutrition Type Chronic Illness Malnutrition    Row Name 09/15/18 1646       Physical Signs of Malnutrition (Chronic)    Muscle Wasting Severe    Fat Loss Severe    Row Name 09/15/18 1646       Criteria Met (Must meet criteria for severity in at least 2 of these categories: M Wasting, Fat Loss, Fluid, Secondary Signs, Wt. Status, Intake)    Patient meets criteria for  Severe malnutrition          Electronically signed by:  Georgette Anderson RD  09/15/18 4:47 PM

## 2018-09-15 NOTE — PLAN OF CARE
Problem: Patient Care Overview  Goal: Plan of Care Review  Outcome: Ongoing (interventions implemented as appropriate)   09/15/18 0666   Coping/Psychosocial   Plan of Care Reviewed With patient;daughter   Plan of Care Review   Progress (initial eval )   SLP evaluation completed. Will address dysphagia. Please see note for further details and recommendations.

## 2018-09-15 NOTE — PLAN OF CARE
"Problem: Patient Care Overview  Goal: Plan of Care Review  Outcome: Ongoing (interventions implemented as appropriate)   09/15/18 0623   Coping/Psychosocial   Plan of Care Reviewed With patient;daughter   Plan of Care Review   Progress improving   OTHER   Outcome Summary Pt's vitals stable. Pt c/o soreness R CT tube site - Hydrocodone given- pt reported relief. Water seal of chest tube chamber bubbled at times -  notified - MD advised this is expected finding. Pt reported \"feeling pretty good, breathing much easier this morning\".      Goal: Discharge Needs Assessment  Outcome: Ongoing (interventions implemented as appropriate)   09/14/18 0916 09/15/18 0623   Discharge Needs Assessment   Readmission Within the Last 30 Days --  no previous admission in last 30 days   Concerns to be Addressed --  denies needs/concerns at this time   Patient/Family Anticipates Transition to home with family --    Transportation Anticipated family or friend will provide --    Current Discharge Risk --  chronically ill   Disability   Equipment Currently Used at Home wheelchair;oxygen;commode;walker, rolling --        Problem: Skin Injury Risk (Adult)  Goal: Identify Related Risk Factors and Signs and Symptoms  Outcome: Ongoing (interventions implemented as appropriate)   09/15/18 0623   Skin Injury Risk (Adult)   Related Risk Factors (Skin Injury Risk) advanced age;body weight extremes;tissue perfusion altered;mobility impaired     Goal: Skin Health and Integrity  Outcome: Ongoing (interventions implemented as appropriate)   09/15/18 0623   Skin Injury Risk (Adult)   Skin Health and Integrity making progress toward outcome       Problem: Fall Risk (Adult)  Goal: Identify Related Risk Factors and Signs and Symptoms  Outcome: Ongoing (interventions implemented as appropriate)   09/15/18 0623   Fall Risk (Adult)   Related Risk Factors (Fall Risk) age-related changes;fatigue/slow reaction;gait/mobility problems;history of " falls;environment unfamiliar   Signs and Symptoms (Fall Risk) presence of risk factors     Goal: Absence of Fall  Outcome: Ongoing (interventions implemented as appropriate)   09/15/18 0623   Fall Risk (Adult)   Absence of Fall achieves outcome

## 2018-09-15 NOTE — CONSULTS
Referring Provider: Freddie Piña M.D.  Reason for Consultation: Pneumothorax and pleural effusion    Patient Care Team:  Lubna Robertson MD as PCP - General (Internal Medicine)  Eugene Langston MD as Consulting Physician (Internal Medicine)    Chief complaint:  Shortness of breath    Subjective .     History of present illness:  88-year-old female with a history of congestive heart failure and valvular heart disease consisting primarily of mitral regurgitation.  She also has a history of atrial fibrillation and sick sinus syndrome and has had a pacemaker placed.  She is status post a Mitraclip procedure one year ago.    She is a lifelong nonsmoker and has no history of lung disease.    She has had intermittent shortness of breath for the last year.  She has been on varying amounts of supplemental oxygen.  She recently had increasing amounts of shortness of breath prompting admission to the hospital in [place].  She was told she had pulmonary hypertension and pleural fluid.  Follow-up was arranged with Dr. Hanna.    She underwent right-sided cardiac catheterization yesterday which revealed a decreased cardiac output, mean PA pressure of 30, and a pulmonary capillary wedge pressure of 18 with a 12 mm V wave.    Chest x-ray revealed right greater than left pleural effusions.  These were present in 2017 though her more sizable on the most recent films.  A right-sided thoracentesis was performed in radiology.  This resulted in a non-expanded right lung and pneumothorax and a pigtail right chest tube was placed.  Chest x-ray this morning shows continued small to moderate pneumothorax.    I was asked to see her in consultation.    Review of Systems  Pertinent items are noted in HPI, all other systems reviewed and negative    History  Past Medical History:   Diagnosis Date   • Acute pericardial effusion 09/12/2017   • Anxiety    • Arthritis    • Asthma    • Atrial fibrillation (CMS/[facility]) 5/9/2017   •  Atrial thrombus 06/2017    R atrial lead   • Bleeding ulcer     History of bleeding ulcer 15-20 years ago.   • Breast cancer (CMS/[facility])     left    • Chronic anticoagulation    • Chronic diastolic CHF (congestive heart failure) (CMS/[facility])    • Diverticula of colon    • Dyspnea on exertion 5/15/2017   • First degree AV block    • GERD (gastroesophageal reflux disease) 5/9/2017   • H. pylori infection 2011   • Hyperlipidemia 5/9/2017   • Hypertension 5/9/2017   • Hypothyroidism 5/9/2017   • Migraine 5/9/2017   • Osteoporosis 5/9/2017   • Oxygen dependent     2 L   • Pacemaker    • Pneumonia    • PVD (peripheral vascular disease) (CMS/[facility])    • Respiratory failure, post-operative (CMS/[facility]) 09/12/2017   • RLS (restless legs syndrome) 5/9/2017   • SSS (sick sinus syndrome) (CMS/[facility])    • Valvular heart disease 05/15/2017    Mitral stenosis with regurgitation   • Wears dentures    • Wears eyeglasses    ,   Past Surgical History:   Procedure Laterality Date   • APPENDECTOMY     • BREAST SURGERY Left     radical mastectomy left    • CARDIAC CATHETERIZATION N/A 5/22/2017    Procedure: Left Heart Cath;  Surgeon: Natasha Hanna MD;  Location:  [facility];  Service:    • CARDIAC CATHETERIZATION N/A 6/13/2017    for mitral clip but unable to perform due to atrial thrombus   • CARDIAC CATHETERIZATION N/A 9/12/2017    Procedure: Abigail Clip;  Surgeon: Natasha Hanna MD;  Location:  [facility];  Service:    • CARDIAC ELECTROPHYSIOLOGY PROCEDURE N/A 12/14/2017    Procedure: AV node ablation;  Surgeon: Avery Breen MD;  Location:  [facility];  Service:    • CHOLECYSTECTOMY     • COLON RESECTION  08/17/2013    Colon resection for diverticulosis   • COLONOSCOPY     • EYE SURGERY Bilateral     cataract   • HYSTERECTOMY  1968   • MASTECTOMY Left 1989   • MITRAL VALVE SURGERY      mitraclip   • PACEMAKER IMPLANTATION  09/08/2005    with generator change 09/2011   •  PERICARDIOCENTESIS  09/12/2017   • SHOULDER ARTHROSCOPY Left    • TEETH EXTRACTION     ,   Family History   Problem Relation Age of Onset   • No Known Problems Mother    • Coronary artery disease Father    • Stroke Father    • Diabetes Sister    • Coronary artery disease Sister    • Lung cancer Brother    ,   Social History   Substance Use Topics   • Smoking status: Never Smoker   • Smokeless tobacco: Never Used   • Alcohol use No   ,   Prescriptions Prior to Admission   Medication Sig Dispense Refill Last Dose   • aspirin 81 MG tablet Take 81 mg by mouth Daily. 30 tablet 11 9/14/2018 at 0600   • bumetanide (BUMEX) 2 MG tablet Take 2 mg by mouth Daily.   9/14/2018 at 0600   • Calcium Carb-Cholecalciferol (CALCIUM 600 + D PO) Take 1 tablet by mouth 2 (Two) Times a Day.   9/14/2018 at 0600   • cholecalciferol (VITAMIN D3) 1000 UNITS tablet Take 1,000 Units by mouth Daily.   9/14/2018 at 0600   • fluticasone (FLONASE) 50 MCG/ACT nasal spray 2 sprays into the nostril(s) as directed by provider Daily.   9/14/2018 at 0600   • HYDROcodone-acetaminophen (NORCO) 5-325 MG per tablet Take 1 tablet by mouth Every 4 (Four) Hours As Needed for Moderate Pain .   9/14/2018 at 0600   • levothyroxine (LEVOXYL) 150 MCG tablet Take 150 mcg by mouth Every Morning.   9/14/2018 at 0600   • meloxicam (MOBIC) 7.5 MG tablet Take 7.5 mg by mouth Every Night.   9/13/2018 at 2100   • metoprolol succinate XL (TOPROL-XL) 25 MG 24 hr tablet Take 0.5 tablets by mouth Every Morning. 30 tablet 11 9/14/2018 at 0600   • Multiple Vitamins-Minerals (CENTRUM ADULTS PO) Take 1 tablet by mouth Daily.   9/14/2018 at 0600   • nortriptyline (PAMELOR) 25 MG capsule Take 25 mg by mouth Every Night.   9/13/2018 at 2100   • O2 (OXYGEN) Inhale 3 L/min See Admin Instructions. Patient wears 3L at all times.   9/14/2018 at Unknown time   • omeprazole (priLOSEC) 40 MG capsule Take 40 mg by mouth Every Morning.   9/14/2018 at 0600   • pancrelipase, Lip-Prot-Amyl,  "(CREON) 02602 UNITS capsule delayed-release particles capsule Take 24,000 units of lipase by mouth 3 (Three) Times a Day With Meals.   9/14/2018 at 0600   • potassium chloride (MICRO-K) 10 MEQ CR capsule Take 10 mEq by mouth Daily.   9/14/2018 at 0600   • pravastatin (PRAVACHOL) 40 MG tablet Take 40 mg by mouth Every Night.   9/13/2018 at 2100   • rOPINIRole (REQUIP) 1 MG tablet Take 1 mg by mouth 3 (Three) Times a Day. Taking 1mg up to 2-3 times per day (max 4mg per day)   9/14/2018 at 0600   • triamcinolone (KENALOG) 0.1 % cream Apply 1 application topically 1 (One) Time Per Week.   9/14/2018 at 0600   • vitamin B-12 (CYANOCOBALAMIN) 1000 MCG tablet Take 1,000 mcg by mouth Daily.   9/14/2018 at 0600   • warfarin (COUMADIN) 1 MG tablet Take  by mouth Take As Directed. 2 mg mon wed fri with 1 mg sun tue thurs and sat   Past Month at 9/8/18   • warfarin (COUMADIN) 2 MG tablet Take 1/2-1 tablet by mouth daily as directed by anticoagulation clinic pharmacist 90 tablet 3 Past Month at 09/08/18   • bumetanide (BUMEX) 1 MG tablet TAKE 3 TABLETS DAILY 270 tablet 1     and Allergies:  Doxycycline; Fosamax [alendronate]; Levofloxacin; and Metronidazole    Objective     Vital Signs   Temp:  [97.4 °F (36.3 °C)-98.3 °F (36.8 °C)] 97.4 °F (36.3 °C)  Heart Rate:  [] 73  Resp:  [16-24] 16  BP: (105-141)/(49-67) 110/67    Physical Exam:   Objective:  General Appearance:  In no acute distress (Thin elderly white female).    Vital signs: (most recent): Blood pressure 110/67, pulse 73, temperature 97.4 °F (36.3 °C), temperature source Oral, resp. rate 16, height 167.6 cm (66\"), weight 48 kg (105 lb 13.1 oz), SpO2 100 %.    HEENT: Normal HEENT exam.    Lungs:  Normal effort and normal respiratory rate.  She is not in respiratory distress.  There are rales and decreased breath sounds.  No wheezes or rhonchi.    Heart: Normal rate.  Regular rhythm.  S1 normal and S2 normal.  No murmur, gallop or friction rub.   Chest: Symmetric " chest wall expansion.   Abdomen: Abdomen is soft and non-distended.  Bowel sounds are normal.   There is no abdominal tenderness.   There is no mass. There is no splenomegaly. There is no hepatomegaly.   Extremities: There is no deformity or dependent edema.    Neurological: Patient is alert and oriented to person, place and time.    Pupils:  Pupils are equal, round, and reactive to light.    Skin:  Warm and dry.              Results Review:   I reviewed the patient's new clinical results.  I reviewed the patient's new imaging results and agree with the interpretation.      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 Atrial fibrillation/sick sinus syndrome:  a. 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  b. Status post dual-chamber [employer]tronic N Rhythm pacemaker, 09/08/2005 (implanted on the right side).  c. Rythmol therapy since, 09/08/2005.  d. Chronic anticoagulation with Coumadin with a CHADS score = 2.  e. Normal LV function and left atrial dimension by echocardiogram, July 2005.  f. Negative exercise Cardiolite, January 2008; negative adenosine Cardiolite, June 2005; negative stress echocardiogram, April 2004.  g. First-degree AV block.  h. Generator change, September 2011, in [place], [place].         Hypertension    Hyperlipidemia    Hypothyroidism    Overview  Signed 5/9/2017  8:54 PM by Kathrine Moss     2. Hypothyroidism, on chronic replacement therapy.                 88-year-old female with acute on chronic heart failure and bilateral pleural effusions.  These have been present to some degree for probably a year or more based upon a review of her prior x-rays.  She has a history of valvular heart disease which was addressed with a mitraclip procedure one year ago, atrial fibrillation and sinus node dysfunction with a history of pacemaker placement, and possible diastolic dysfunction.  I have reviewed her right heart catheterization data.  Her pulmonary hypertension is secondary and of a group 2 etiology due to left heart disease.  She has required oxygen as a result of this.  There is no evidence of intrinsic lung disease based upon her history and exposures.    She has a persistent pneumothorax after thoracentesis yesterday requiring a chest tube.  She has ongoing pleural fluid drainage and an air leak via the chest tube today.    Recommendations:    1. Continue chest tube to suction.  Hopefully the air leak will cease and the lung will reexpand.  The tube is positioned properly and mechanically sound.  2. From a pleural effusion standpoint, unfortunately, if the underlying cardiac disease cannot be further addressed medically then I would expect continued problems with pleural fluid and dyspnea.  The patient and her daughter say that higher dose of diuretics have been tried in the past that she felt that she was overly dehydrated with them.  I will increase the dose empirically while she is here since her chest tube output remains high.  3. Discussed with Dr. Piña.  Will monitor chest tube output and daily x-rays.  Will follow with you.    I discussed the patients findings and my recommendations with patient, family, nursing staff and consulting provider    Pravin Donohue MD  Pulmonary and Critical Care Medicine  09/15/18  6:34 PM

## 2018-09-15 NOTE — CONSULTS
"     [facility] Medicine Services  CONSULT NOTE      Patient Name: Marilee Judge  : 6/3/1930  MRN: [id number]    Primary Care Physician: Lubna Robertson MD  Referring Provider: Natasha Hanna,*    Subjective   Subjective     Reason for Consultation:  Possible cellulitis & medical co-managment    HPI:  Marilee Judge is a 88 y.o. female w/ history of sick sinus syndrome/afib (w/ prior pacer implanted on right side; s/p generator change 2011 in [place]), afib on coumadin as outpatient (failed propafenone), valvular heart disease w/ moderate to severe MR (s/p bileaflet mitraClip placed 17), htn, hl, pad (s/p LHC w/ near normal coronaries, but had ptca & stent of ostial right iliac artery using biliary stent 17), hypothyroidism, migraines, gerd, prior left mastectomy () for breast cancer, prior ccy and hysterectomy, prior colon resection for diverticulosis () at [facility] for diverticular disease.   Patient was admitted 18 by cardiology service for right sided heart catheterization to further evaluate pulmonary hypertension that was diagnosed on outlying facility's echocardiogram. Had recently been admitted to [facility] at the end of august due to worsening dyspnea at that time. Apparently has been referred to a pulmonologist locally in [facility] but cannot get in to see him until 2019 and would like to be referred to a pulmonologist here at [facility] if possible. Right heart cath was performed 18 which showed mild-moderate elevation of pulmonary pressures w/ mean PA pressure 30mmhg w/ reduced cardiac output and index. Cardiology planned admission for thoracentesis (due to noted bilateral moderate pleural effusions, and this was performed on 18 w/ 1400cc straw colored fluid removed w/ subsequent noted lack of reexpansion  Resulting in \"pneumothora\" so 10 German chest tube was inserted by radiology. Hospitalists were " asked to see regarding possible cellulitis     Review of Systems  Confusion  Failure to thrive  Legs red      Otherwise 10-system ROS reviewed and is negative except as mentioned in the HPI.      Past Medical History:   Diagnosis Date   • Acute pericardial effusion 09/12/2017   • Anxiety    • Arthritis    • Asthma    • Atrial fibrillation (CMS/[facility]) 5/9/2017   • Atrial thrombus 06/2017    R atrial lead   • Bleeding ulcer     History of bleeding ulcer 15-20 years ago.   • Breast cancer (CMS/[facility])     left    • Chronic anticoagulation    • Chronic diastolic CHF (congestive heart failure) (CMS/[facility])    • Diverticula of colon    • Dyspnea on exertion 5/15/2017   • First degree AV block    • GERD (gastroesophageal reflux disease) 5/9/2017   • H. pylori infection 2011   • Hyperlipidemia 5/9/2017   • Hypertension 5/9/2017   • Hypothyroidism 5/9/2017   • Migraine 5/9/2017   • Osteoporosis 5/9/2017   • Oxygen dependent     2 L   • Pacemaker    • Pneumonia    • PVD (peripheral vascular disease) (CMS/[facility])    • Respiratory failure, post-operative (CMS/[facility]) 09/12/2017   • RLS (restless legs syndrome) 5/9/2017   • SSS (sick sinus syndrome) (CMS/[facility])    • Valvular heart disease 05/15/2017    Mitral stenosis with regurgitation   • Wears dentures    • Wears eyeglasses        Past Surgical History:   Procedure Laterality Date   • APPENDECTOMY     • BREAST SURGERY Left     radical mastectomy left    • CARDIAC CATHETERIZATION N/A 5/22/2017    Procedure: Left Heart Cath;  Surgeon: Natasha Hanna MD;  Location:  [facility] CATH INVASIVE LOCATION;  Service:    • CARDIAC CATHETERIZATION N/A 6/13/2017    for mitral clip but unable to perform due to atrial thrombus   • CARDIAC CATHETERIZATION N/A 9/12/2017    Procedure: Abigail Clip;  Surgeon: Natasha Hanna MD;  Location:  [facility] CATH INVASIVE LOCATION;  Service:    • CARDIAC ELECTROPHYSIOLOGY PROCEDURE N/A 12/14/2017    Procedure: AV node ablation;  Surgeon: Avery Breen MD;   Location: [facility] INVASIVE LOCATION;  Service:    • CHOLECYSTECTOMY     • COLON RESECTION  08/17/2013    Colon resection for diverticulosis   • COLONOSCOPY     • EYE SURGERY Bilateral     cataract   • HYSTERECTOMY  1968   • MASTECTOMY Left 1989   • MITRAL VALVE SURGERY      mitraclip   • PACEMAKER IMPLANTATION  09/08/2005    with generator change 09/2011   • PERICARDIOCENTESIS  09/12/2017   • SHOULDER ARTHROSCOPY Left    • TEETH EXTRACTION         Family History: family history includes Coronary artery disease in her father and sister; Diabetes in her sister; Lung cancer in her brother; No Known Problems in her mother; Stroke in her father.     Social History:  reports that she has never smoked. She has never used smokeless tobacco. She reports that she does not drink alcohol or use drugs.    Medications:  Prescriptions Prior to Admission   Medication Sig Dispense Refill Last Dose   • aspirin 81 MG tablet Take 81 mg by mouth Daily. 30 tablet 11 9/14/2018 at 0600   • bumetanide (BUMEX) 2 MG tablet Take 2 mg by mouth Daily.   9/14/2018 at 0600   • Calcium Carb-Cholecalciferol (CALCIUM 600 + D PO) Take 1 tablet by mouth 2 (Two) Times a Day.   9/14/2018 at 0600   • cholecalciferol (VITAMIN D3) 1000 UNITS tablet Take 1,000 Units by mouth Daily.   9/14/2018 at 0600   • fluticasone (FLONASE) 50 MCG/ACT nasal spray 2 sprays into the nostril(s) as directed by provider Daily.   9/14/2018 at 0600   • HYDROcodone-acetaminophen (NORCO) 5-325 MG per tablet Take 1 tablet by mouth Every 4 (Four) Hours As Needed for Moderate Pain .   9/14/2018 at 0600   • levothyroxine (LEVOXYL) 150 MCG tablet Take 150 mcg by mouth Every Morning.   9/14/2018 at 0600   • meloxicam (MOBIC) 7.5 MG tablet Take 7.5 mg by mouth Every Night.   9/13/2018 at 2100   • metoprolol succinate XL (TOPROL-XL) 25 MG 24 hr tablet Take 0.5 tablets by mouth Every Morning. 30 tablet 11 9/14/2018 at 0600   • Multiple Vitamins-Minerals (CENTRUM ADULTS PO) Take 1 tablet  by mouth Daily.   9/14/2018 at 0600   • nortriptyline (PAMELOR) 25 MG capsule Take 25 mg by mouth Every Night.   9/13/2018 at 2100   • O2 (OXYGEN) Inhale 3 L/min See Admin Instructions. Patient wears 3L at all times.   9/14/2018 at Unknown time   • omeprazole (priLOSEC) 40 MG capsule Take 40 mg by mouth Every Morning.   9/14/2018 at 0600   • pancrelipase, Lip-Prot-Amyl, (CREON) 77768 UNITS capsule delayed-release particles capsule Take 24,000 units of lipase by mouth 3 (Three) Times a Day With Meals.   9/14/2018 at 0600   • potassium chloride (MICRO-K) 10 MEQ CR capsule Take 10 mEq by mouth Daily.   9/14/2018 at 0600   • pravastatin (PRAVACHOL) 40 MG tablet Take 40 mg by mouth Every Night.   9/13/2018 at 2100   • rOPINIRole (REQUIP) 1 MG tablet Take 1 mg by mouth 3 (Three) Times a Day. Taking 1mg up to 2-3 times per day (max 4mg per day)   9/14/2018 at 0600   • triamcinolone (KENALOG) 0.1 % cream Apply 1 application topically 1 (One) Time Per Week.   9/14/2018 at 0600   • vitamin B-12 (CYANOCOBALAMIN) 1000 MCG tablet Take 1,000 mcg by mouth Daily.   9/14/2018 at 0600   • warfarin (COUMADIN) 1 MG tablet Take  by mouth Take As Directed. 2 mg mon wed fri with 1 mg sun tue thurs and sat   Past Month at 9/8/18   • warfarin (COUMADIN) 2 MG tablet Take 1/2-1 tablet by mouth daily as directed by anticoagulation clinic pharmacist 90 tablet 3 Past Month at 09/08/18   • bumetanide (BUMEX) 1 MG tablet TAKE 3 TABLETS DAILY 270 tablet 1        Allergies   Allergen Reactions   • Doxycycline Hives     HIVES, RED FACE   • Fosamax [Alendronate] GI Intolerance   • Levofloxacin Hives     HIVES, RED FACE   • Metronidazole GI Intolerance       Objective   Objective     Vital Signs:   Temp:  [97 °F (36.1 °C)-98.3 °F (36.8 °C)] 98.3 °F (36.8 °C)  Heart Rate:  [] 80  Resp:  [16-24] 20  BP: (105-142)/(49-93) 118/55     Physical Exam  Cachectic, confused pleasant, nasal canula in place, no distress  Temporal wasting, oroph clear  Right  decrased air movement apical; right chest tube in place; decreased left lower lung field air movement  abd soft, nontender  No cce  Mild b/l erythema w/ minimal warmth foot  Equal , and face symmetric    Results Reviewed:  I have personally reviewed current lab, radiology, and data and agree.      Results from last 7 days  Lab Units 09/14/18  1101 09/13/18  1243   WBC 10*3/mm3  --  5.49   HEMOGLOBIN g/dL  --  8.4*   HEMATOCRIT %  --  28.8*   PLATELETS 10*3/mm3  --  211   INR  1.2  --        Results from last 7 days  Lab Units 09/13/18  1243   SODIUM mmol/L 139   POTASSIUM mmol/L 3.9   CHLORIDE mmol/L 95*   CO2 mmol/L 39.0*   BUN mg/dL 21   CREATININE mg/dL 0.82   GLUCOSE mg/dL 90   CALCIUM mg/dL 9.0   ALT (SGPT) U/L 20   AST (SGOT) U/L 37*     Estimated Creatinine Clearance: 35.9 mL/min (by C-G formula based on SCr of 0.82 mg/dL).  Brief Urine Lab Results  (Last result in the past 365 days)      Color   Clarity   Blood   Leuk Est   Nitrite   Protein   CREAT   Urine HCG        11/10/17 1509 Yellow Clear Negative Small (1+)(A) Negative 30 mg/dL (1+)(A)             No results found for: BNP    Microbiology Results Abnormal     None          Imaging Results (last 24 hours)     Procedure Component Value Units Date/Time    XR Chest 1 View [[id number]] Updated:  09/15/18 0556    XR Chest 1 View [[id number]] Collected:  09/14/18 1630     Updated:  09/14/18 1710    Narrative:       EXAMINATION: XR CHEST 1 VW-09/14/2018:      INDICATION: Post chest tube placement; R06.02-Shortness of breath;  R93.1-Abnormal findings on diagnostic imaging of heart and coronary  circulation.      COMPARISON: 09/13/2018.     FINDINGS: Portable chest reveals re-expansion pneumothorax identified on  the right. Right chest tubes in satisfactory position with decrease seen  in size of the right pleural effusion. No change seen in the moderate  sized left pleural effusion. The heart is enlarged.           Impression:       Re-expansion  "pneumothorax on the right. Decrease seen in  size with removal of the right pleural fluid. The left pleural fluid  collection is stable. Degenerative changes seen within the spine with  enlargement of the heart.     D:  09/14/2018  E:  09/14/2018     This report was finalized on 9/14/2018 5:08 PM by Dr. Chloe Hathaway MD.       CT Guided Chest Tube [[id number]] Collected:  09/14/18 1634     Updated:  09/14/18 1634    Narrative:       EXAMINATION: CT GUIDED CHEST TUBE PLACEMENT- 09/14/2018     INDICATION: R06.02-Shortness of breath; R93.1-Abnormal findings on  diagnostic imaging of heart and coronary circulation         TECHNIQUE: Using CT guidance, local anesthesia and sterile technique, an  8.5 [place] catheter was inserted into a right pleural effusion.     The radiation dose reduction device was turned on for each scan per the  ALARA (As Low as Reasonably Achievable) protocol.     COMPARISON: NONE     FINDINGS: Approximately 1400 cc of straw-colored fluid was removed.  Subsequent CT images demonstrated marked improvement in the pleural  effusion but without reexpansion of the left lung resulting in a  \"pneumothorax.\" Therefore the catheter was exchanged for a 10 [place]  catheter which was inserted as a chest tube, sutured in position and the  patient returned to her room in satisfactory condition. Orders were  given for management of the chest tube.       Impression:       1400 cc of fluid was removed from a right thoracentesis.  Subsequently images demonstrated the lack of reexpansion of the right  lung and therefore a chest tube was inserted and a slightly larger 10  [place] catheter was inserted as a chest tube. Once in her room orders  have been given to chest tube management.     D:  09/14/2018  E:  09/14/2018           Results for orders placed during the hospital encounter of 09/12/17   Adult Transthoracic Echo Limited    Narrative · Trivial pericardial effusion  · Normal left ventricular systolic " function wall motion  · A single MitraClip is seen in position across the mitral leaflets.          Assessment/Plan   Assessment / Plan     Hospital Problem List     Shortness of breath    Overview Signed 9/7/2018 12:25 PM by Tracy Whitney, RN     Added automatically from request for surgery [id number]         Abnormal echocardiogram    Overview Signed 9/7/2018 12:25 PM by Tracy Whitney, RN     Added automatically from request for surgery [id number]               *Progressive dyspnea  *Mild-moderate Pulmonary hypertension  *Bilateral Pleural effusions   -s/p 1400cc thoracentesis 9/14/18  *Pneumothorax right lung (after thoracentesis)  *Hx afib/sss (prior pacemaker)  *Hx VHD (s/p mitral clip)  *BLE cellulitis  *Chronic DHf  *PAD (prior right iliac artery stent)  *Hypothyroidism on replacement rx  *pre-dm (a1c 6.4)  *failure to thrive  ------------------------------  Plan:  --------------------------------  -right chest tube to suction; I have consulted pulm for management of chest tube and pneumothorax (cxr this a.m. Shows persistence of the pneumothorax)    -cbc, cmp, inr, tsh  -continue synthroid replacement  -diuresis per cards  -a.m. lab ordered  -rocephin daily (cellulitis); allergic doxy/quinalone  -failure to thrive; dietition consultation  -check bid fsbs (if >200 increase frequency, told nurse)    *d/w daughter, patient deteriorating clinically over last weeks. DNR/DnI/No feeding tubes but full support otherwise currently      Thank you for allowing [facility] Medicine Service to provide consultative care for your patient, we will continue to follow while clinically appropriate.    Electronically signed by Freddie Piña MD, 09/15/18, 7:31 AM.

## 2018-09-15 NOTE — PROGRESS NOTES
"[facility] at [facility] - Progress Note    Marilee Judge  6/3/1930  [id number]     LOS: 0 days     Patient Care Team:  Lubna Robertson MD as PCP - General (Internal Medicine)  Eugene Langston MD as Consulting Physician (Internal Medicine)    09/15/18    Chief Complaint: Shortness of breath/PAH/VHD     Subjective:   Sitting up in chair, states that her shortness of breath has remarkably improved after placement of chest tube.  No chest pain, not eating well but denies nausea.      aspirin 81 mg Oral Daily   atorvastatin 10 mg Oral Nightly   bumetanide 2 mg Oral Daily   cholecalciferol 1,000 Units Oral Daily   fluticasone 2 spray Nasal Daily   levothyroxine 150 mcg Oral Q AM   meloxicam 7.5 mg Oral Nightly   metoprolol succinate XL 12.5 mg Oral Daily   Daryl      nortriptyline 25 mg Oral Nightly   O2 3 L/min Inhalation See Admin Instructions   pancrelipase (Lip-Prot-Amyl) 24,000 units of lipase Oral TID With Meals   pantoprazole 40 mg Oral Q AM   potassium chloride 10 mEq Oral Daily   rOPINIRole 1 mg Oral Q8H   sennosides-docusate sodium 2 tablet Oral Nightly   vitamin B-12 1,000 mcg Oral Daily       Objective:  Vitals:   height is 167.6 cm (66\") and weight is 48 kg (105 lb 13.1 oz). Her oral temperature is 98.3 °F (36.8 °C). Her blood pressure is 118/55 and her pulse is 80. Her respiration is 20 and oxygen saturation is 99%.     Intake/Output Summary (Last 24 hours) at 09/15/18 0757  Last data filed at 09/15/18 0658   Gross per 24 hour   Intake              360 ml   Output             2400 ml   Net            -2040 ml       Physical Exam:  General: No apparent distress.  Cachectic appearing.  Neck: no JVD.  Chest:No respiratory distress, breath sounds are a  at bases .  Cardiovascular: Normal S1 and S2, no murmer, gallop or rub.    Extremities: No edema.         Results from last 7 days  Lab Units 09/13/18  1243   WBC 10*3/mm3 5.49   HEMOGLOBIN g/dL 8.4*   HEMATOCRIT % 28.8*   PLATELETS " 10*3/mm3 211       Results from last 7 days  Lab Units 09/13/18  1243   SODIUM mmol/L 139   POTASSIUM mmol/L 3.9   CHLORIDE mmol/L 95*   CO2 mmol/L 39.0*   BUN mg/dL 21   CREATININE mg/dL 0.82   CALCIUM mg/dL 9.0   BILIRUBIN mg/dL 0.6   ALK PHOS U/L 131*   ALT (SGPT) U/L 20   AST (SGOT) U/L 37*   GLUCOSE mg/dL 90       Results from last 7 days  Lab Units 09/14/18  1101   INR  1.2           Results from last 7 days  Lab Units 09/14/18  0926   CHOLESTEROL mg/dL 100   TRIGLYCERIDES mg/dL 67   HDL CHOL mg/dL 42   LDL CHOL mg/dL 47           Lab Results  Lab Value Date/Time   TROPONINI 0.032 09/27/2017 0350   TROPONINI 0.032 09/27/2017 0019   TROPONINI 0.022 09/26/2017 2207   TROPONINI 0.023 09/24/2017 1541       Tele:  Paced 73      Problem List:  1. Shortness of breath  · RHC 9/14/18 revealing mild to moderate elevation of pulm pressures, mean PA pressure of 30 mm Hg, CO 3.2, CI 2.1  · S/P right Thoracentesis- 1400 cc 09/14/18  · S/P right chest tube insertion-09/14/18  · Pulmonary Consult today  2.  Atrial Fibrillation  · CHADS-VASc= 4- chronic coumadin therapy, currently on hold  · S/P [employer] PPM- S/P AVN ablation 12/14/17  3.  Valvular Heart Disease  · S/P Abigail-clip 9/12/17  4.   Cellulitis bilateral - [facility] medicine  5.   Hypertension  6.   Hyperlipidemia     Plan:  1. Continue chest tube drainage and current medical management.  2. Stable/improved clinically.  3. We will follow.     Neda Shankar RN. 9/15/2018  7:57 AM     I, Amaury Bryan MD, personally performed the services described in this documentation as scribed by the above named individual in my presence, and it is both accurate and complete.  9/15/2018  11:36 AM

## 2018-09-16 PROBLEM — D50.9 IRON DEFICIENCY ANEMIA: Status: ACTIVE | Noted: 2018-01-01

## 2018-09-16 PROBLEM — E46 MALNUTRITION (HCC): Status: ACTIVE | Noted: 2018-01-01

## 2018-09-16 PROBLEM — L03.116 CELLULITIS OF BOTH FEET: Status: ACTIVE | Noted: 2018-01-01

## 2018-09-16 PROBLEM — R13.10 DYSPHAGIA: Status: ACTIVE | Noted: 2018-01-01

## 2018-09-16 PROBLEM — L03.115 CELLULITIS OF BOTH FEET: Status: ACTIVE | Noted: 2018-01-01

## 2018-09-16 PROBLEM — R62.7 FAILURE TO THRIVE IN ADULT: Status: ACTIVE | Noted: 2018-01-01

## 2018-09-16 PROBLEM — J95.811 POSTPROCEDURAL PNEUMOTHORAX: Status: ACTIVE | Noted: 2018-01-01

## 2018-09-16 NOTE — PROGRESS NOTES
PULMONARY NOTE     Hospital Day: 1    Ms. Marilee Judge, 88 y.o. female is followed for:   Pleural effusion and pneumothorax         SUBJECTIVE     88-year-old female with a history of congestive heart failure and valvular heart disease consisting primarily of mitral regurgitation.  She also has a history of atrial fibrillation and sick sinus syndrome and has had a pacemaker placed.  She is status post a Mitraclip procedure one year ago.     She is a lifelong nonsmoker and has no history of lung disease.     She has had intermittent shortness of breath for the last year.  She has been on varying amounts of supplemental oxygen.  She recently had increasing amounts of shortness of breath prompting admission to the hospital in [place].  She was told she had pulmonary hypertension and pleural fluid.  Follow-up was arranged with Dr. Hanna.     She underwent right-sided cardiac catheterization yesterday which revealed a decreased cardiac output, mean PA pressure of 30, and a pulmonary capillary wedge pressure of 18 with a 12 mm V wave.     Chest x-ray revealed right greater than left pleural effusions.  These were present in 2017 though her more sizable on the most recent films.  A right-sided thoracentesis was performed in radiology.  This resulted in a non-expanded right lung and pneumothorax and a pigtail right chest tube was placed.  A small air leak was present at the time of my initial consultation on 9/15    Interval history:    Chest tube was inadvertently pulled out early this morning hitting to the bedside commode.  Chest x-ray reveals an actual decrease in the size of the pleural air on the right.  A follow-up chest x-ray 6 hours later revealed stability of the pneumothorax.  She remains perfectly comfortable.    The patient's relevant past medical, surgical and social history were reviewed and updated in Epic as appropriate.        OBJECTIVE     Vital Sign Min/Max for last 24 hours  Temp  Min: 97  "°F (36.1 °C)  Max: 97.7 °F (36.5 °C)   BP  Min: 99/49  Max: 126/55   Pulse  Min: 69  Max: 91   Resp  Min: 16  Max: 18   SpO2  Min: 100 %  Max: 100 %   No Data Recorded   No Data Recorded      Intake/Output Summary (Last 24 hours) at 09/16/18 1619  Last data filed at 09/16/18 1300   Gross per 24 hour   Intake              480 ml   Output             2520 ml   Net            -2040 ml      Flowsheet Rows      First Filed Value   Admission Height  167.6 cm (66\") Documented at 09/14/2018 0916   Admission Weight  48 kg (105 lb 13.1 oz) Documented at 09/14/2018 0916        Body mass index is 17.08 kg/m². 1    09/14/18  0916   Weight: 48 kg (105 lb 13.1 oz)         hold 1 each        Objective:  General Appearance:  In no acute distress (Thin elderly female).    Vital signs: (most recent): Blood pressure 107/53, pulse 79, temperature 97.6 °F (36.4 °C), temperature source Axillary, resp. rate 16, height 167.6 cm (66\"), weight 48 kg (105 lb 13.1 oz), SpO2 100 %.    HEENT: Normal HEENT exam.    Lungs:  Normal effort and normal respiratory rate.  She is not in respiratory distress.  There are decreased breath sounds.  No rales, wheezes or rhonchi.  (Decreased breath sounds right side)  Heart: Normal rate.  Regular rhythm.  S1 normal and S2 normal.  No murmur, gallop or friction rub.   Chest: Symmetric chest wall expansion.   Abdomen: Abdomen is soft and non-distended.  Bowel sounds are normal.   There is no abdominal tenderness.   There is no mass. There is no splenomegaly. There is no hepatomegaly.   Extremities: There is no deformity or dependent edema.    Neurological: Patient is alert and oriented to person, place and time.    Pupils:  Pupils are equal, round, and reactive to light.    Skin:  Warm and dry.                      I reviewed the patient's results and images, including reviewing images and reports.    Medications reviewed.      Scheduled Meds:  aspirin 81 mg Oral Daily   atorvastatin 10 mg Oral Nightly "   bumetanide 2 mg Oral BID   ceftriaxone 1 g Intravenous Q24H   cholecalciferol 1,000 Units Oral Daily   ferric gluconate (FERRLECIT) IVPB 62.5 mg Intravenous Daily   fluticasone 2 spray Nasal Daily   [START ON 9/18/2018] levothyroxine 125 mcg Oral Q AM   meloxicam 7.5 mg Oral Nightly   metoprolol succinate XL 12.5 mg Oral Daily   Daryl      nortriptyline 25 mg Oral Nightly   O2 3 L/min Inhalation See Admin Instructions   pancrelipase (Lip-Prot-Amyl) 24,000 units of lipase Oral TID With Meals   pantoprazole 40 mg Oral Q AM   potassium chloride 10 mEq Oral Daily   rOPINIRole 1 mg Oral Q8H   sennosides-docusate sodium 2 tablet Oral Nightly   vitamin B-12 1,000 mcg Oral Daily         Assessment/Plan   ASSESSMENT      Hospital Problem List     * (Principal)Shortness of breath    Overview Deleted 9/15/2018  6:27 PM by Pravin Donohue MD            Acute on chronic diastolic heart failure (CMS/HCC)    Bilateral pleural effusion    Mitral valve stenosis and regurgitation    Overview Signed 9/15/2018  6:33 PM by Pravin Donohue MD     History of Mitraclip procedure 9/2017         Other secondary pulmonary hypertension    Overview Signed 9/15/2018  6:34 PM by Pravin Donohue MD     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 Atrial fibrillation/sick sinus syndrome:  a. 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  b. Status post dual-chamber Medtronic N Rhythm pacemaker, 09/08/2005 (implanted on the right side).  c. Rythmol therapy since, 09/08/2005.  d. Chronic anticoagulation with Coumadin with a CHADS score = 2.  e. Normal LV function and left atrial dimension by echocardiogram, July 2005.  f. Negative exercise Cardiolite, January 2008; negative adenosine Cardiolite, June 2005; negative stress echocardiogram, April 2004.  g. First-degree AV  block.  h. Generator change, September 2011, in [place], [place].         Hypertension    Hyperlipidemia    Hypothyroidism    Overview Signed 5/9/2017  8:54 PM by Kathrine Moss     2. Hypothyroidism, on chronic replacement therapy.           Iron deficiency anemia    Postprocedural pneumothorax    Dysphagia    Failure to thrive in adult    Malnutrition (CMS/HCC)    Cellulitis of both feet            88-year-old female with acute and chronic congestive heart failure with a history of arrhythmias, valvular heart disease, and possible diastolic dysfunction.  She has group 2 secondary pulmonary hypertension.  Please see my consultation yesterday.    She has had persistent and enlarging bilateral pleural effusions and had a thoracentesis performed with a non-expanded right lung requiring chest tube.  That has since been pulled out inadvertently but her pneumothorax is small enough and stable.          RECOMMENDATIONS     1. No need for additional chest tube placement at this point   2. Follow-up chest x-ray in a.m.   3. I would expect reaccumulation of pleural fluid to some degree.  I increased her Bumex dose temporarily and hopefully her medications can be optimized to minimize her pleural fluid.    4. If she can't be medically managed then the only choices would be a Pleurx drain versus symptomatic/palliative treatment.           I discussed the patient's findings and my recommendations with patient, family and nursing staff         Pravin Donohue MD  Pulmonary and Critical Care Medicine

## 2018-09-16 NOTE — SIGNIFICANT NOTE
Pt's chest tube out - discovered when getting pt up to bedside commode. Pt in no respiratory distress, denied shortness of air. RN and Charge RN, care tech and daughter at bedside. R flank CT tube site covered with 4x4 gauze and paper tape.  (Pulmmonology) notified - order for STAT CXR received. CXR tech on unit - came in immediately. Pt is resting comfortably at the moment. Will continue to monitor and await instructions from MD.

## 2018-09-16 NOTE — PROGRESS NOTES
[facility] Medicine Services  PROGRESS NOTE    Patient Name: Marilee Judge  : 6/3/1930  MRN: [id number]    Date of Admission: 2018  Length of Stay: 1  Primary Care Physician: Lubna Robertson MD    Subjective   Subjective     CC:  Cellulitis, anemia, hypothyroidism    HPI:  Breathing ok today despite chest tube falling out while moving from bedside commode. No chest pain. Does have difficulty swallowing on occasion. No emesis. General weakness. No bleeding noted. Feet less red today    Review of Systems  No headache    Otherwise ROS is negative except as mentioned in the HPI.    Objective   Objective     Vital Signs:   Temp:  [97 °F (36.1 °C)-97.7 °F (36.5 °C)] 97 °F (36.1 °C)  Heart Rate:  [69-91] 69  Resp:  [16-18] 16  BP: ()/(42-55) 99/49        Physical Exam:  Cachectic, alert, nontoxic, sitting in chair appears comfortable, nasal canula in place  Temporal wasting, oroph clear  Appropriate affect  rrr  Right decrased air movement apical;  Decreased air movement bilateral bases, normal effort  No cce  Minimal b/l erythema (improved); chronic venous stasis noted  Equal , and face symmetric; speech clear       Results Reviewed:  I have personally reviewed current lab, radiology, and data and agree.      Results from last 7 days  Lab Units 18  0555 09/15/18  0810 18  1101 18  1243   WBC 10*3/mm3 4.93 5.42  --  5.49   HEMOGLOBIN g/dL 8.8* 8.7*  --  8.4*   HEMATOCRIT % 30.9* 29.7*  --  28.8*   PLATELETS 10*3/mm3 174 181  --  211   INR   --  1.15* 1.2  --        Results from last 7 days  Lab Units 18  0555 09/15/18  0810 18  1243   SODIUM mmol/L 141 143 139   POTASSIUM mmol/L 4.1 4.1 3.9   CHLORIDE mmol/L 97* 101 95*   CO2 mmol/L 40.0* 34.0* 39.0*   BUN mg/dL 23 20 21   CREATININE mg/dL 0.95 0.73 0.82   GLUCOSE mg/dL 113* 100 90   CALCIUM mg/dL 8.6* 8.7 9.0   ALT (SGPT) U/L  --  18 20   AST (SGOT) U/L  --  32 37*     Estimated Creatinine  Clearance: 31 mL/min (by C-G formula based on SCr of 0.95 mg/dL).  No results found for: BNP    Microbiology Results Abnormal     None          Imaging Results (last 24 hours)     Procedure Component Value Units Date/Time    XR Chest 1 View [[id number]] Updated:  09/16/18 1339    XR Chest 1 View [[id number]] Collected:  09/15/18 0822     Updated:  09/16/18 1055    Narrative:          EXAMINATION: XR CHEST 1 VW - 9/15/2018     INDICATION:  R06.02-Shortness of breath; R93.1-Abnormal findings on  diagnostic imaging of heart and coronary circulation.      COMPARISON: 9/14/2018.     FINDINGS: Portable chest reveals a chest tube identified on the right  with reexpansion pneumothorax present. A pleural effusion is present,  unchanged. The heart is enlarged. Cardiac pacer leads in satisfactory  position.           Impression:       Chest tube remains on the right with reexpansion  pneumothorax on the right. No evidence of a pleural effusion on the  right with no change in the pleural effusion on the left.     DICTATED:   9/15/2018  EDITED/ls :   9/15/2018      This report was finalized on 9/16/2018 10:53 AM by Dr. Chloe Hathaway MD.       XR Chest 1 View [[id number]] Collected:  09/16/18 0600     Updated:  09/16/18 0826    Narrative:       EXAM:    XR Chest, 1 View     EXAM DATE/TIME:    9/16/2018 6:00 AM     CLINICAL HISTORY:    88 years old, female; Shortness of breath; Abnormal findings on diagnostic   imaging of heart and coronary circulation; Pain; Other: Pulled out chest tube;   Additional info: Patient pulled out chest tube out this am     TECHNIQUE:    XR of the chest, 1 view.     COMPARISON:    CR - XR CHEST 1 VW 9/15/2018 5:50 AM     FINDINGS:    Tubes, catheters and devices:  The right chest tube has been removed. A   pacemaker device is present, and its leads are in appropriate position.    Lungs:  Stable left basilar airspace disease.    Pleural space:  Residual right pneumothorax, smaller at the right base  than on   the prior examination. There is a stable left pleural effusion.    Heart/Mediastinum:  The heart demonstrates mild diffuse enlargement.    Bones/joints:  There are degenerative changes of the spine and shoulders.       Impression:       Residual right pneumothorax following right chest tube removal, smaller at the   right base than on the prior examination. Otherwise stable chest.    THIS DOCUMENT HAS BEEN ELECTRONICALLY SIGNED BY BRYAN GORDON MD        Results for orders placed during the hospital encounter of 09/12/17   Adult Transthoracic Echo Limited    Narrative · Trivial pericardial effusion  · Normal left ventricular systolic function wall motion  · A single MitraClip is seen in position across the mitral leaflets.          I have reviewed the medications.      aspirin 81 mg Oral Daily   atorvastatin 10 mg Oral Nightly   bumetanide 2 mg Oral BID   ceftriaxone 1 g Intravenous Q24H   cholecalciferol 1,000 Units Oral Daily   ferric gluconate (FERRLECIT) IVPB 62.5 mg Intravenous Daily   fluticasone 2 spray Nasal Daily   [START ON 9/18/2018] levothyroxine 125 mcg Oral Q AM   meloxicam 7.5 mg Oral Nightly   metoprolol succinate XL 12.5 mg Oral Daily   Daryl      nortriptyline 25 mg Oral Nightly   O2 3 L/min Inhalation See Admin Instructions   pancrelipase (Lip-Prot-Amyl) 24,000 units of lipase Oral TID With Meals   pantoprazole 40 mg Oral Q AM   potassium chloride 10 mEq Oral Daily   rOPINIRole 1 mg Oral Q8H   sennosides-docusate sodium 2 tablet Oral Nightly   vitamin B-12 1,000 mcg Oral Daily         Assessment/Plan   Assessment / Plan     Hospital Problem List     * (Principal)Shortness of breath    Overview Deleted 9/15/2018  6:27 PM by Pravin Donohue MD            Acute on chronic diastolic heart failure (CMS/HCC)    Other secondary pulmonary hypertension    Overview Signed 9/15/2018  6:34 PM by Pravin Donohue MD     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 Atrial fibrillation/sick sinus syndrome:  a. 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  b. Status post dual-chamber Medtronic N Rhythm pacemaker, 09/08/2005 (implanted on the right side).  c. Rythmol therapy since, 09/08/2005.  d. Chronic anticoagulation with Coumadin with a CHADS score = 2.  e. Normal LV function and left atrial dimension by echocardiogram, July 2005.  f. Negative exercise Cardiolite, January 2008; negative adenosine Cardiolite, June 2005; negative stress echocardiogram, April 2004.  g. First-degree AV block.  h. Generator change, September 2011, in [place], [place].         Hypertension    Hyperlipidemia    Hypothyroidism    Overview Signed 5/9/2017  8:54 PM by Kathrine Moss     2. Hypothyroidism, on chronic replacement therapy.           Bilateral pleural effusion    Mitral valve stenosis and regurgitation    Overview Signed 9/15/2018  6:33 PM by Pravin Donohue MD     History of Mitraclip procedure 9/2017         Iron deficiency anemia    Dysphagia    Failure to thrive in adult    Malnutrition (CMS/HCC)    Cellulitis of both feet             Brief Hospital Course to date:  Marilee Judge is a 88 y.o. female w/ history of sick sinus syndrome/afib (w/ prior pacer implanted on right side; s/p generator change 9/2011 in [place]), afib on coumadin as outpatient (failed propafenone), valvular heart disease w/ moderate to severe MR (s/p bileaflet mitraClip placed 9/12/17), htn, hl, pad (s/p C w/ near normal coronaries, but had ptca & stent of ostial right iliac artery using biliary stent 5/22/17), hypothyroidism, migraines, gerd, prior left mastectomy (1989) for breast cancer, prior ccy and hysterectomy, prior colon resection for diverticulosis (2013) at [facility] for diverticular disease.              Patient was admitted 9/14/18 by  "cardiology service for right sided heart catheterization to further evaluate pulmonary hypertension that was diagnosed on outlying facility's echocardiogram. Had recently been admitted to [facility] at the end of august due to worsening dyspnea at that time. Apparently has been referred to a pulmonologist locally in [facility] but cannot get in to see him until January 2019 and would like to be referred to a pulmonologist here at [facility] if possible. Right heart cath was performed 9/14/18 which showed mild-moderate elevation of pulmonary pressures w/ mean PA pressure 30mmhg w/ reduced cardiac output and index. Cardiology planned admission for thoracentesis (due to noted bilateral moderate pleural effusions, and this was performed on 9/14/18 w/ 1400cc straw colored fluid removed w/ subsequent noted lack of reexpansion  Resulting in \"pneumothorax\" so 10 Azerbaijani chest tube was inserted by radiology. Hospitalists were asked to see regarding possible cellulitis.      Assessment:    *Progressive dyspnea (due to chf, pulm htn)  *A/C DHF (w/ hx mitral clip for valvular heart dz)  *Bilateral Pleural effusions              -s/p 1400cc thoracentesis 9/14/18  *Post procedural (thoracentesis) pneumothorax   -chest tube \"fell out\" 9/15; now pulmonary monitoring  *Pulmonary Hypertension  *Hx afib/sss (prior pacemaker)  *BLE cellulitis  *Hx PAD (prior right iliac artery stent)  *Hypothyroidism on replacement rx (w/ low tsh)  *iron deficiency anemia  *pre-dm (a1c 6.4)  *dysphagia, w/ prior hx of esophageal dilation  *failure to thrive  ------------------------------------------------------------------------------  Plan:    -continue rocephin for now (likely stop in 1-2 days as cellulitis looks nearly resolved, now w/ mostly chronic venous stasis)  -decrease synthroid to 125mcg daily (from 150mcg) as tsh on low dose; recheck tsh 4-6 weeks  -iv iron x 3 days  -hgb, bmp in a.m.    Appears to have severe/end stage " "DHF w/ 2ndary pulmonary HTN, now w/ increased bumex dose.   -Pulmonary also following, assisting in post procedural pneumothorax (chest tube spontanously fell out today, repeat cxr \"stable\"; management per pulmonary, see if needs another chest tube or not  -Cards managing end stage dhf. Now on increased bumex dose.  -If pulm and cards agree, perhaps palliative consultation would be appropriate given malnourished state, functional decline and (what appears to be) end stage dhf. Could consider palliative egd w/ dilation if/once respiratory status improved.      CODE STATUS:   Code Status and Medical Interventions:   Ordered at: 09/15/18 0812     Limited Support to NOT Include:    Intubation    Artificial Nutrition     Code Status:    No CPR     Medical Interventions (Level of Support Prior to Arrest):    Limited         Electronically signed by Freddie Piña MD, 09/16/18, 3:29 PM.      "

## 2018-09-16 NOTE — PROGRESS NOTES
"[facility] - Progress Note    Marilee Judge  6/3/1930  N619/1     LOS: 1 day     Patient Care Team:  Lubna Robertson MD as PCP - General (Internal Medicine)  Eugene Langston MD as Consulting Physician (Internal Medicine)    09/16/18    Chief Complaint: Shortness of breath/PAH/VHD     Subjective:   Drowsy on exam, arousable, denies chest pain or shortness of breath, states that she felt \"fine\".       aspirin 81 mg Oral Daily   atorvastatin 10 mg Oral Nightly   bumetanide 2 mg Oral BID   ceftriaxone 1 g Intravenous Q24H   cholecalciferol 1,000 Units Oral Daily   fluticasone 2 spray Nasal Daily   levothyroxine 150 mcg Oral Q AM   meloxicam 7.5 mg Oral Nightly   metoprolol succinate XL 12.5 mg Oral Daily   Daryl      nortriptyline 25 mg Oral Nightly   O2 3 L/min Inhalation See Admin Instructions   pancrelipase (Lip-Prot-Amyl) 24,000 units of lipase Oral TID With Meals   pantoprazole 40 mg Oral Q AM   potassium chloride 10 mEq Oral Daily   rOPINIRole 1 mg Oral Q8H   sennosides-docusate sodium 2 tablet Oral Nightly   vitamin B-12 1,000 mcg Oral Daily       Objective:  Vitals:   height is 167.6 cm (66\") and weight is 48 kg (105 lb 13.1 oz). Her axillary temperature is 97 °F (36.1 °C). Her blood pressure is 126/55 and her pulse is 73. Her respiration is 16 and oxygen saturation is 100%.       Intake/Output Summary (Last 24 hours) at 09/16/18 1034  Last data filed at 09/16/18 0900   Gross per 24 hour   Intake              600 ml   Output             1970 ml   Net            -1370 ml       Physical Exam:  General: No apparent distress.  Cachectic appearing.  Neck: no JVD.  Chest:No respiratory distress, breath sounds are diminished at bases .  Cardiovascular: Normal S1 and S2, no murmer, gallop or rub.    Extremities: No edema.           Results from last 7 days  Lab Units 09/16/18  0555   WBC 10*3/mm3 4.93   HEMOGLOBIN g/dL 8.8*   HEMATOCRIT % 30.9*   PLATELETS 10*3/mm3 174       Results " from last 7 days  Lab Units 09/16/18  0555 09/15/18  0810   SODIUM mmol/L 141 143   POTASSIUM mmol/L 4.1 4.1   CHLORIDE mmol/L 97* 101   CO2 mmol/L 40.0* 34.0*   BUN mg/dL 23 20   CREATININE mg/dL 0.95 0.73   CALCIUM mg/dL 8.6* 8.7   BILIRUBIN mg/dL  --  0.7   ALK PHOS U/L  --  126*   ALT (SGPT) U/L  --  18   AST (SGOT) U/L  --  32   GLUCOSE mg/dL 113* 100       Results from last 7 days  Lab Units 09/15/18  0810 09/14/18  1101   INR  1.15* 1.2       Results from last 7 days  Lab Units 09/15/18  0810   TSH mIU/mL 0.122*       Results from last 7 days  Lab Units 09/14/18  0926   CHOLESTEROL mg/dL 100   TRIGLYCERIDES mg/dL 67   HDL CHOL mg/dL 42   LDL CHOL mg/dL 47           Lab Results  Lab Value Date/Time   TROPONINI 0.032 09/27/2017 0350   TROPONINI 0.032 09/27/2017 0019   TROPONINI 0.022 09/26/2017 2207   TROPONINI 0.023 09/24/2017 1541       Tele:  Paced 73      Problem List:  1. Shortness of breath  · RHC 9/14/18 revealing mild to moderate elevation of pulm pressures, mean PA pressure of 30 mm Hg, CO 3.2, CI 2.1  · S/P right Thoracentesis- 1400 cc 09/14/18  · S/P right chest tube insertion-09/14/18  · Pulmonary Consult today  2.  Atrial Fibrillation  · CHADS-VASc= 4- chronic coumadin therapy, currently on hold  · S/P Medtronic PPM- S/P AVN ablation 12/14/17  3.  Valvular Heart Disease  · S/P Abigail-clip 9/12/17  4.   Cellulitis bilateral LE- per [facility] medicine  5.   Hypertension  6.   Hyperlipidemia     Plan:  1. Chest tube pulled out this morning while trying to ambulate.  Will need to be reassessed with follow-up chest x-ray to see if reinsertion as needed for recurrent progressive effusion.   2. Continue current medical management for now, clinically she appears very stable, maintaining good oxygen saturation and stable sinus/paced rhythm.     Lubna Padilla PA-C     I have seen and examined the patient, case was discussed with the physician extender, reviewed the above note, necessary changes were made and I  agree with the final note.    Amaury Bryan MD, FACC, [facility]

## 2018-09-16 NOTE — PLAN OF CARE
Problem: Patient Care Overview  Goal: Plan of Care Review  Outcome: Ongoing (interventions implemented as appropriate)   09/16/18 0458   Coping/Psychosocial   Plan of Care Reviewed With patient;daughter   Plan of Care Review   Progress improving   OTHER   Outcome Summary Pt's vitals stable. R CT tube output slowing down, pt voided approximately 900ml's this shift. Pt remains on 3ltrs nasal canula. Pt c/o cristian le pain (burning/aching) - Xanax and Tylenolol given - pt reported relief. Pt slept majority of the night.      Goal: Discharge Needs Assessment  Outcome: Ongoing (interventions implemented as appropriate)   09/14/18 0916 09/15/18 0623   Discharge Needs Assessment   Readmission Within the Last 30 Days --  no previous admission in last 30 days   Transportation Anticipated family or friend will provide --    Current Discharge Risk --  chronically ill       Problem: Skin Injury Risk (Adult)  Goal: Identify Related Risk Factors and Signs and Symptoms  Outcome: Ongoing (interventions implemented as appropriate)   09/15/18 0623   Skin Injury Risk (Adult)   Related Risk Factors (Skin Injury Risk) advanced age;body weight extremes;tissue perfusion altered;mobility impaired     Goal: Skin Health and Integrity  Outcome: Ongoing (interventions implemented as appropriate)   09/16/18 0458   Skin Injury Risk (Adult)   Skin Health and Integrity making progress toward outcome       Problem: Fall Risk (Adult)  Goal: Identify Related Risk Factors and Signs and Symptoms  Outcome: Ongoing (interventions implemented as appropriate)   09/15/18 0623   Fall Risk (Adult)   Related Risk Factors (Fall Risk) age-related changes;fatigue/slow reaction;gait/mobility problems;history of falls;environment unfamiliar   Signs and Symptoms (Fall Risk) presence of risk factors     Goal: Absence of Fall  Outcome: Ongoing (interventions implemented as appropriate)   09/16/18 0458   Fall Risk (Adult)   Absence of Fall making progress toward outcome        Problem: Chest Tube Drainage Device (Adult)  Goal: Signs and Symptoms of Listed Potential Problems Will be Absent, Minimized or Managed (Chest Tube Drainage Device)  Outcome: Ongoing (interventions implemented as appropriate)   09/16/18 0454   Goal/Outcome Evaluation   Problems Assessed (Chest Tube Drainage Device) all   Problems Assessed (Chest Tube Drain Dev) none

## 2018-09-17 PROBLEM — I48.21 PERMANENT ATRIAL FIBRILLATION (HCC): Status: ACTIVE | Noted: 2017-05-09

## 2018-09-17 PROBLEM — T17.900A SILENT ASPIRATION: Status: ACTIVE | Noted: 2018-01-01

## 2018-09-17 NOTE — PLAN OF CARE
Problem: Patient Care Overview  Goal: Plan of Care Review  Outcome: Ongoing (interventions implemented as appropriate)   09/17/18 1025   Coping/Psychosocial   Plan of Care Reviewed With patient   OTHER   Outcome Summary MBS completed   MBS evaluation completed. Pt silently aspirated all consistencies. Will address dysphagia. Please see note for further details and recommendations.

## 2018-09-17 NOTE — DISCHARGE PLACEMENT REQUEST
"Please see attached orders for home health   Patient states she is current with your agency   New order attached   Please call if any concerns  Will notify on day of discharge   Tatyana Mason RN/CM  [phone]    Marilee Kurtz (88 y.o. Female)     Date of Birth Social Security Number Address Home Phone MRN    06/03/1930  [address] [place] [address] [phone] [id number]    [employer] Marital Status          Denominational        Admission Date Admission Type Admitting Provider Attending Provider Department, Room/Bed    9/14/18 Elective Natasha Hanna MD Hollingsworth, Paula W, MD [facility] 6A, N619/1    Discharge Date Discharge Disposition Discharge Destination                       Attending Provider:  Natasha Hanna MD    Allergies:  Doxycycline, Fosamax [Alendronate], Levofloxacin, Metronidazole    Isolation:  None   Infection:  None   Code Status:  No CPR    Ht:  167.6 cm (66\")   Wt:  48 kg (105 lb 13.1 oz)    Admission Cmt:  None   Principal Problem:  Shortness of breath [R06.02]                 Active Insurance as of 9/14/2018     Primary Coverage     Payor Plan Insurance Group Employer/Plan Group    MEDICARE MEDICARE A & B      Payor Plan Address Payor Plan Phone Number Effective From Effective To    [address] [phone] 6/1/1995     [facility] [id number]       Subscriber Name Subscriber Birth Date Member ID       MARILEE KURTZ 6/3/1930 [id number]           Secondary Coverage     Payor Plan Insurance Group Employer/Plan Group    ANTH BLUE CROSS ANTH BLUE CROSS BLUE SHIELD O [id number]     Payor Plan Address Payor Plan Phone Number Effective From Effective To    [address] [phone] 1/1/2011     [facility] [id number]       Subscriber Name Subscriber Birth Date Member ID       MARILEE KURTZ 6/3/1930 [id number]                 Emergency Contacts      (Rel.) Home Phone Work Phone Mobile Phone    Nazia Britton " (Daughter) [phone] -- [phone]    RobertMay (Sister) [phone] -- --          [place] [id number] [facility] [address]  Phone:  [phone]  Fax:   Date: Sep 17, 2018      Ambulatory Referral to Home Health     Patient:  Marilee Judge MRN:  [id number]   [address] [place] [address] :  6/3/1930  SSN:    Phone: [phone] Sex:  F      INSURANCE PAYOR PLAN GROUP # SUBSCRIBER ID   Primary:  Secondary:    MEDICARE  [facility] [employer] CROSS [id number]  [id number]    [id number]  [id number]      Referring Provider Information:  DIMPLE PIERCE Phone: [phone] Fax:       Referral Information:   # Visits:  1 Referral Type: Home Health [42]   Urgency:  Routine Referral Reason: Specialty Services Required   Start Date: Sep 17, 2018 End Date:  To be determined by Insurer   Diagnosis: Shortness of breath (R06.02 [ICD-10-CM] 786.05 [ICD-9-CM])  Pharyngeal dysphagia (R13.13 [ICD-10-CM] 787.23 [ICD-9-CM])  Acute on chronic diastolic heart failure (CMS/HCC) (I50.33 [ICD-10-CM] 428.33 [ICD-9-CM])      Refer to Dept:   Refer to Provider:   Refer to Facility:       Face to Face Visit Date: 2018  Follow-up Provider for Plan of Care? I treated the patient in an acute care facility and will not continue treatment after discharge.  Follow-up Provider: NEREYDA SMITH [[id number]]  Reason/Clinical Findings: Shortness of breath, aspirating, increased weakness, continued functional decline  Describe mobility limitations that make leaving home difficult: Shortness of breath, impaired mobility, impaired endurance, continued functional decline  Nursing/Therapeutic Services Requested: Skilled Nursing  Nursing/Therapeutic Services Requested: Physical Therapy  Nursing/Therapeutic Services Requested: Speech Therapy  Skilled nursing orders: Medication education  Skilled nursing orders: O2 instruction  Skilled nursing orders: Cardiopulmonary assessments  PT  orders: Total joint pathway  PT orders: Therapeutic exercise  PT orders: Gait Training  PT orders: Transfer training  PT orders: Strengthening  PT orders: Home safety assessment  Weight Bearing Status: As Tolerated  SLP orders: Dysphagia therapy     This document serves as a request of services and does not constitute Insurance authorization or approval of services.  To determine eligibility, please contact the members Insurance carrier to verify and review coverage.     If you have medical questions regarding this request for services. Please contact [address] at [phone] between the hours of 8:00am - 5:00pm (Mon-Fri).             Verbal Order Mode: Verbal with readback   Authorizing Provider: Freddie Piña MD  Authorizing Provider's NPI: [id number]     Order Entered By: Tatyana Mason RN 9/17/2018  3:54 PM     Electronically signed by:  Freddie Piña MD  9/17/2018 3:56 PM                 History & Physical      Natasha Hanna MD at 9/14/2018  8:57 AM          [facility] Consult Note      Referring Provider: Natasha aHnna,*  Primary Provider:  Lubna Robertson MD  Reason for Consultation: shortness of breath    Patient Care Team:  Lubna Robertson MD as PCP - General (Internal Medicine)  Eugene Langston MD as Consulting Physician (Internal Medicine)    Chief complaint:  Shortness of breath    Identification:  88-year-old  female    Problem list:    1. Atrial fibrillation/sick sinus syndrome:  a. 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  b. Status post dual-chamber Medtronic N Rhythm pacemaker, 09/08/2005 (implanted on the right side).  c. Rythmol therapy since, 09/08/2005.  d. Chronic anticoagulation with Coumadin with a CHADS score = 4.  e. Normal LV function and left atrial dimension by echocardiogram, July 2005.  f. Negative exercise Cardiolite, January 2008; negative adenosine Cardiolite, June 2005;  negative stress echocardiogram, April 2004.  g. First-degree AV block.  h. Generator change, September 2011, in [place], [place].  i. ECV 9/27/2017: Post cardioversion the patient displayed a sinus rhythm.  j. Recurrent atrial fibrillation September 27, 2017. Propafenone discontinued.  Amiodarone initiated.  k. Cardioversion, 9/27/2017: Post cardioversion the patient displayed a sinus rhythm.  l. Cardioversion, 11/10/2017: Post cardioversion the patient displayed a sinus rhythm.  m. AV node ablation, 12/14/2017: Successful electrical recordings of the His bundle, right ventricle, and high right atrium. Successful radiofrequency ablation of the AV node.  2. Valvular Heart Disease  a. Echocardiogram 4/15/17:  Moderate to severe MR and TR.  No MVP. Normal LV function.  b. KWADWO, 5/22/2017: EF 60%. Moderate to sever MR. 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 Mild AR. Mild TR.  c. MitraClip placed 9/12/17 (single)   d. Echo limited 9/13/2017: Trivial pericardial effusion. Normal LV systolic function. Single MitraClip seen in position across MV leaflets.   3. Hypertension.  4. Hyperlipidemia.  5. Peripheral vascular disease  a. OMARI, 2/21/2018: The right OMARI is normal. Moderate digital ischemia. The left OMARI is normal. Moderate digital ischemia. Normal right OMARI at 1.09, normal left OMARI at 1.05.  6. Dyspnea on exertion  a. Cardiac catheterization, 5/22/2017: Near normal coronary arteries. Successful PTCA and stent placement in the ostial right iliac using a 8 x 17 express LD biliary stent.  7. Hypothyroidism, on chronic replacement therapy.  8. History of breast cancer, status post left mastectomy, 1989.  9. Osteoporosis.  10. Migraine headaches.  11. History of bleeding ulcer 15-20 years ago.  12. Gastroesophageal reflux disease.  13. Restless legs syndrome.  14. History of Helicobacter pylori, 2011.  15. Surgical history:  a. Hysterectomy, 1968.  b. Left mastectomy, 1989.  c. Colon resection for  diverticulosis, 08/17/2013, Dr. Guadarrama at [facility].  d. Cholecystectomy.    Allergies:  Doxycycline; Fosamax [alendronate]; Levofloxacin; and Metronidazole    Home/Current Medications:    Aspirin 81 mg daily  Bumex 1 mg, 2 tablets daily  Calcium 600+ D twice a day  Vitamin D3 1000 units daily  Flonase 50 mics 2 sprays daily  Norco 5-325 mg half tablet 4 times a day  Levothyroxine 150 mics daily  Meloxicam 7.5 mg daily  Metoprolol succinate XL 25 mg half tablet daily  Multivitamin daily  Pamelor 25 mg daily at bedtime  Oxygen 2 L when necessary (using it more often than not)  Prilosec 40 mg daily  Creon 24,000 units 3 times a day  Potassium chloride 10 mEq daily  Pravachol 40 mg daily at bedtime  Requip 1 mg 3 times a day juan necessary  Kenalog 0.1% cream weekly  Vitamin B12 1000 mcg daily  Coumadin as directed      History of present illness:    Patient is a very pleasant 88-year-old female with the above-noted medical history who presents today for her right sided heart catheterization for further evaluation of pulmonary hypertension that was diagnosed on an echocardiogram and an outlying facility.  She recently had been admitted at [facility] in the end of August for worsening shortness of breath.  nd echocardiogram was performed indicating pulmonary hypertension.  The images have been reviewed by Dr. Hanna and she felt that her right cardiac catheterization was appropriate to further assess.  She states that she typically has felt her baseline up until that time and had no cardiovascular complaints.  She states that when she is lying flat, she will noticed random sharp fleeting pains that go across her chest but they did not occur on a regular basis.  She states that they only last a couple of seconds and are self-limiting.  Her daughter indicates that her rate has remained in rhythm.  .  Her daughter states that she has been given a pulmonologist to follow with but cannot get in to  see him in [place] until January 2019.  She would like to possibly get established with a pulmonologist here, much sooner, if at all possible      Cardiac Risk Factors:  Hypertension, hyperlipidemia, advanced age female    Previous cardiac catheterization:   5/22/2017  Details:  Near normal CORS.  Successful PTCA and PCI in ostial right iliac 8 x 17 express LD biliary stent     Social History:  Social History     Social History   • Marital status:      Spouse name: N/A   • Number of children: 1   • Years of education: N/A     Occupational History   • [employer] Retired     Social History Main Topics   • Smoking status: Never Smoker   • Smokeless tobacco: Never Used   • Alcohol use No   • Drug use: No   • Sexual activity: Defer     Other Topics Concern   • Not on file     Social History Narrative    Lives in [place], [place] with daughter     Family History:  Family History   Problem Relation Age of Onset   • No Known Problems Mother    • Coronary artery disease Father    • Stroke Father    • Diabetes Sister    • Coronary artery disease Sister    • Lung cancer Brother      Review of Systems  Pertinent positives are listed in the HPI.  All other systems reviewed are negative.         Objective     Vital Sign Min/Max for last 24 hours  Temp  Min: 97 °F (36.1 °C)  Max: 97 °F (36.1 °C)   BP  Min: 140/78  Max: 140/78   Pulse  Min: 85  Max: 85   Resp  Min: 16  Max: 16   SpO2  Min: 97 %  Max: 97 %   No Data Recorded   No Data Recorded         Physical Exam:    GENERAL: well-developed, well-nourished; in no acute distress.   NECK:  Carotid upstrokes are 2+ and  symmetrical without bruits.   LUNGS: Clear, diminished to auscultation bilaterally without wheezing, rhonchi, or rales noted.   CARDIOVASCULAR: The heart has a regular rate with a normal S1 and S2. There is no murmur, gallop, rub, or click appreciated. The PMI is nondisplaced.   ABDOMEN: Soft and nontender  NEUROLOGICAL: Nonfocal;  Alert and oriented  PERIPHERAL VASCULAR:  Posterior tibial pulses are 2+ and symmetrical. There is trace peripheral edema.   SKIN:  Warm and dry  PSYCHIATRIC: normal mood and affect; behavior appropriate       EKG:   NSR    Echocardiogram: recent at [facility] indicating pulmonary hypertension.  Cyndi has reviewed the images on file.     Labs:        Results from last 7 days  Lab Units 09/13/18  1243   SODIUM mmol/L 139   POTASSIUM mmol/L 3.9   CHLORIDE mmol/L 95*   CO2 mmol/L 39.0*   BUN mg/dL 21   CREATININE mg/dL 0.82   CALCIUM mg/dL 9.0   BILIRUBIN mg/dL 0.6   ALK PHOS U/L 131*   ALT (SGPT) U/L 20   AST (SGOT) U/L 37*   GLUCOSE mg/dL 90     Lab Results   Component Value Date    WBC 5.49 09/13/2018    HGB 8.4 (L) 09/13/2018    HCT 28.8 (L) 09/13/2018    MCV 85.2 09/13/2018     09/13/2018     Lab Results   Component Value Date    CHOL 144 06/12/2017    TRIG 87 06/12/2017    HDL 60 06/12/2017    LDL 67 06/12/2017        Lab Results   Component Value Date    INR 3.70 09/06/2018    INR 3.80 09/04/2018    INR 2.40 08/30/2018    PROTIME 36.9 (H) 05/24/2018    PROTIME 20.4 (H) 12/11/2017    PROTIME 24.6 (H) 11/10/2017       Ejection Fraction:  60% per echo 2017      Results Review:  I reviewed the patients new clinical results.      Assessment:  1.  Dyspnea on exertion  e. Cardiac catheterization, 5/22/2017: Near normal coronary arteries. Successful PTCA and stent placement in the ostial right iliac using a 8 x 17 express LD biliary stent.  2.  A-fib/ SSS  e. 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  f. Status post dual-chamber Medtronic N Rhythm pacemaker, 09/08/2005 (implanted on the right side).  g. Rythmol therapy since, 09/08/2005.  h. Chronic anticoagulation with Coumadin with a CHADS score = 4.  i. Normal LV function and left atrial dimension by echocardiogram, July 2005.  j. Negative exercise Cardiolite, January 2008; negative adenosine Cardiolite, June 2005; negative  stress echocardiogram, April 2004.  k. First-degree AV block.  l. Generator change, September 2011, in [place], [place].  m. ECV 9/27/2017: Post cardioversion the patient displayed a sinus rhythm.  n. Recurrent atrial fibrillation September 27, 2017. Propafenone discontinued.  Amiodarone initiated.  o. Cardioversion, 9/27/2017: Post cardioversion the patient displayed a sinus rhythm.  p. Cardioversion, 11/10/2017: Post cardioversion the patient displayed a sinus rhythm.  q. AV node ablation, 12/14/2017: Successful electrical recordings of the His bundle, right ventricle, and high right atrium. Successful radiofrequency ablation of the AV node.  3. Valvular Heart Disease  a. Echocardiogram 4/15/17:  Moderate to severe MR and TR.  No MVP. Normal LV function.  b. KWADWO, 5/22/2017: EF 60%. Moderate to sever MR. 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 Mild AR. Mild TR.  c. MitraClip placed 9/12/17 (single)   d. Echo limited 9/13/2017: Trivial pericardial effusion. Normal LV systolic function. Single MitraClip seen in position across MV leaflets.   4.  Hypertension.  5.  Hyperlipidemia.  6.  Peripheral vascular disease  1. OMARI, 2/21/2018: The right OMARI is normal. Moderate digital ischemia. The left OMARI is normal. Moderate digital ischemia. Normal right OMARI at 1.09, normal left OMARI at 1.05.  7.  Hypothyroidism, on chronic replacement therapy.      Plan:  Right cardiac catheterization today to further assess shortness of breath and pulmonary hypertension with Dr. Natasha Hanna.  The risks, benefits, potential complications of all been discussed with the patient and she is agreeable to proceed.    I discussed the patients findings and my recommendations with patient.    Scribed for Natasha Hanna MD by LUCI Reich on 09/07/2018 at 8:58 AM      LUCI Mcguire  09/14/18  9:30 AM    I Natasha Hanna MD personally performed the services described in this documentation as scribed  by the above individual in my presence, and it is both accurate and complete.    Natasha Hanna MD, [facility]          Electronically signed by Natasha Hanna MD at 2018 11:52 AM          Physician Progress Notes (most recent note)      Freddie Piña MD at 2018  1:15 PM              [facility] Medicine Services  PROGRESS NOTE    Patient Name: Marilee Judge  : 6/3/1930  MRN: [id number]    Date of Admission: 2018  Length of Stay: 2  Primary Care Physician: Lubna Robertson MD    Subjective   Subjective     CC:  Cellulitis, anemia, hypothyroidism    HPI:  Actually breathing a little better today despite chest tube coming out accidentally yesterday. Failed modified swallow today but no current pain. Dyspnea mild.    Review of Systems  No headache    Otherwise ROS is negative except as mentioned in the HPI.    Objective   Objective     Vital Signs:   Temp:  [97.6 °F (36.4 °C)-98.4 °F (36.9 °C)] 98.4 °F (36.9 °C)  Heart Rate:  [71-94] 71  Resp:  [16-22] 16  BP: ()/(45-53) 99/45        Physical Exam:  Cachectic, alert, nontoxic, sitting in chair appears comfortable, nasal canula in place  Temporal wasting, oroph clear  Appropriate affect  rrr  Decreased air movement bilateral lower 1/3 lungs. No distress  Minimal b/l erythema (improved); chronic venous stasis noted  Equal , and face symmetric; speech clear       Results Reviewed:  I have personally reviewed current lab, radiology, and data and agree.      Results from last 7 days  Lab Units 18  0654 18  0555 09/15/18  0810 18  1101 18  1243   WBC 10*3/mm3  --  4.93 5.42  --  5.49   HEMOGLOBIN g/dL 8.8* 8.8* 8.7*  --  8.4*   HEMATOCRIT % 30.1* 30.9* 29.7*  --  28.8*   PLATELETS 10*3/mm3  --  174 181  --  211   INR   --   --  1.15* 1.2  --        Results from last 7 days  Lab Units 18  0654 18  0555 09/15/18  0810 18  1243   SODIUM mmol/L 139 141 143 139    POTASSIUM mmol/L 3.8 4.1 4.1 3.9   CHLORIDE mmol/L 95* 97* 101 95*   CO2 mmol/L 36.0* 40.0* 34.0* 39.0*   BUN mg/dL 25* 23 20 21   CREATININE mg/dL 0.81 0.95 0.73 0.82   GLUCOSE mg/dL 116* 113* 100 90   CALCIUM mg/dL 8.3* 8.6* 8.7 9.0   ALT (SGPT) U/L  --   --  18 20   AST (SGOT) U/L  --   --  32 37*     Estimated Creatinine Clearance: 36.4 mL/min (by C-G formula based on SCr of 0.81 mg/dL).  No results found for: BNP    Microbiology Results Abnormal     None          Imaging Results (last 24 hours)     Procedure Component Value Units Date/Time    XR Chest 1 View [[id number]] Collected:  09/17/18 0856     Updated:  09/17/18 1039    Narrative:       EXAMINATION: XR CHEST 1 VW- 09/17/2018     INDICATION: Respiratory Distress; R06.02-Shortness of breath;  R93.1-Abnormal findings on diagnostic imaging of heart and coronary  circulation      COMPARISON: 09/16/2018     FINDINGS: Portable chest reveals small bilateral pleural effusions. Tiny  apical pneumothorax identified on the right. The right pleural effusion  is slightly increasing in size. The left pleural effusion is stable.  Heart is enlarged. Increased markings seen diffusely throughout the lung  fields suggesting chronic change. Cardiac pacer leads in satisfactory  position. Calcifications seen within the pleura of the left lung apex  which is stable.           Impression:       Slight increase seen in size of the right pleural effusion.  Chronic interstitial changes seen diffusely throughout the lung fields.  No change in size of left pleural effusion. Expansion pneumothorax  stable on the right.     D:  09/17/2018  E:  09/17/2018     This report was finalized on 9/17/2018 10:37 AM by Dr. Chloe Hathaway MD.       FL Video Swallow With Speech [[id number]] Updated:  09/17/18 0953    XR Chest 1 View [[id number]] Collected:  09/16/18 1628     Updated:  09/16/18 1712    Narrative:          EXAMINATION: XR CHEST 1 VW - 9/16/2018     INDICATION: R06.02-Shortness of  breath; R93.1-Abnormal findings on  diagnostic imaging of heart and coronary circulation.      COMPARISON: 9/16/2018.     FINDINGS: Portable chest reveals small right reexpansion pneumothorax is  again present. The right pleural effusion is stable. Small left pleural  effusion. Increased markings at the right lung base. Degenerative change  is seen within the spine. The heart is enlarged.           Impression:       Stable reexpansion pneumothorax on the right with small  right pleural effusion. Left pleural effusion with enlargement of the  heart. No new focal parenchymal opacification present.     DICTATED:   9/16/2018  EDITED/ls :   9/16/2018      This report was finalized on 9/16/2018 5:10 PM by Dr. Chloe Hathaway MD.           Results for orders placed during the hospital encounter of 09/12/17   Adult Transthoracic Echo Limited    Narrative · Trivial pericardial effusion  · Normal left ventricular systolic function wall motion  · A single MitraClip is seen in position across the mitral leaflets.          I have reviewed the medications.      aspirin 81 mg Oral Daily   atorvastatin 10 mg Oral Nightly   bumetanide 2 mg Oral BID   cholecalciferol 1,000 Units Oral Daily   ferric gluconate (FERRLECIT) IVPB 62.5 mg Intravenous Daily   fluticasone 2 spray Nasal Daily   [START ON 9/18/2018] levothyroxine 125 mcg Oral Q AM   meloxicam 7.5 mg Oral Nightly   metoprolol succinate XL 12.5 mg Oral Daily   Daryl      nortriptyline 25 mg Oral Nightly   O2 3 L/min Inhalation See Admin Instructions   pancrelipase (Lip-Prot-Amyl) 24,000 units of lipase Oral TID With Meals   pantoprazole 40 mg Oral Q AM   [START ON 9/18/2018] potassium chloride 20 mEq Oral Daily   rOPINIRole 1 mg Oral Q8H   sennosides-docusate sodium 2 tablet Oral Nightly   vitamin B-12 1,000 mcg Oral Daily         Assessment/Plan   Assessment / Plan     Hospital Problem List     * (Principal)Shortness of breath    Overview Deleted 9/15/2018  6:27 PM by  Pravin Donohue MD            Acute on chronic diastolic heart failure (CMS/HCC)    Bilateral pleural effusion    Other secondary pulmonary hypertension    Overview Signed 9/15/2018  6:34 PM by Pravin Donohue MD     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 Atrial fibrillation/sick sinus syndrome:  a. 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  b. Status post dual-chamber Medtronic N Rhythm pacemaker, 09/08/2005 (implanted on the right side).  c. Rythmol therapy since, 09/08/2005.  d. Chronic anticoagulation with Coumadin with a CHADS score = 2.  e. Normal LV function and left atrial dimension by echocardiogram, July 2005.  f. Negative exercise Cardiolite, January 2008; negative adenosine Cardiolite, June 2005; negative stress echocardiogram, April 2004.  g. First-degree AV block.  h. Generator change, September 2011, in [place], [place].         Hypertension    Hyperlipidemia    Hypothyroidism    Overview Signed 5/9/2017  8:54 PM by Kathrine Moss     2. Hypothyroidism, on chronic replacement therapy.           Mitral valve stenosis and regurgitation    Overview Signed 9/15/2018  6:33 PM by Pravin Donohue MD     History of Mitraclip procedure 9/2017         Iron deficiency anemia    Failure to thrive in adult    Malnutrition (CMS/HCC)    Cellulitis of both feet             Brief Hospital Course to date:  Marilee Judge is a 88 y.o. female w/ history of sick sinus syndrome/afib (w/ prior pacer implanted on right side; s/p generator change 9/2011 in [place]), afib on coumadin as outpatient (failed propafenone), valvular heart disease w/ moderate to severe MR (s/p bileaflet mitraClip placed 9/12/17), htn, hl, pad (s/p C w/ near normal coronaries, but had ptca & stent of ostial  "right iliac artery using biliary stent 5/22/17), hypothyroidism, migraines, gerd, prior left mastectomy (1989) for breast cancer, prior ccy and hysterectomy, prior colon resection for diverticulosis (2013) at [facility] for diverticular disease.              Patient was admitted 9/14/18 by cardiology service for right sided heart catheterization to further evaluate pulmonary hypertension that was diagnosed on outlying facility's echocardiogram. Had recently been admitted to [facility] at the end of august due to worsening dyspnea at that time. Apparently has been referred to a pulmonologist locally in [facility] but cannot get in to see him until January 2019 and would like to be referred to a pulmonologist here at [facility] if possible. Right heart cath was performed 9/14/18 which showed mild-moderate elevation of pulmonary pressures w/ mean PA pressure 30mmhg w/ reduced cardiac output and index. Cardiology planned admission for thoracentesis (due to noted bilateral moderate pleural effusions, and this was performed on 9/14/18 w/ 1400cc straw colored fluid removed w/ subsequent noted lack of reexpansion  Resulting in \"pneumothorax\" so 10 Persian chest tube was inserted by radiology. Hospitalists were asked to see regarding possible cellulitis.      Assessment:    *Progressive dyspnea (due to chf, pulm htn)  *A/C DHF (w/ hx mitral clip for valvular heart dz)  *Bilateral Pleural effusions              -s/p 1400cc thoracentesis 9/14/18  *Post procedural (thoracentesis) pneumothorax   -chest tube \"fell out\" 9/15; now pulmonary monitoring  *Pulmonary Hypertension  *Hx afib/sss (prior pacemaker)  *BLE cellulitis  *Hx PAD (prior right iliac artery stent)  *Hypothyroidism on replacement rx (w/ low tsh)  *iron deficiency anemia  *pre-dm (a1c 6.4)  *dysphagia w/ silent aspiration all media  *hx esophageal stenosis (prior dilation)  *failure to thrive/protein " malnutrtion  ------------------------------------------------------------------------------  Plan:  -bilateral foot cellulitis resolved. Stop rocephin    -regarding bilateral effusions, discussed w/ dr. gifford at bedside w/ family and all agree that foregoing invasive procedures (no pleur-x, etc) as not likely to help underlying issues (end stage heart failure). Pursuing medial treatment w/ diuretics and treatement of heart failure as able and foregoing invasive procedures    -regarding dysphagia, MBS 9/17 showed silent aspiration all media. Family understands not candidate for anesthesia, so egd not realistic option. Comfort diet (family and patient understand aspiraton and worsening respiratory status likely to occur at some point)    -Palliative consultation to help define goals and treat any more symptoms. Depending on clinical course may (if stabilizes on diuretics only) perhaps even home with hospice.      CODE STATUS:   Code Status and Medical Interventions:   Ordered at: 09/15/18 0812     Limited Support to NOT Include:    Intubation    Artificial Nutrition     Code Status:    No CPR     Medical Interventions (Level of Support Prior to Arrest):    Limited         Electronically signed by Freddie Piña MD, 09/17/18, 1:16 PM.        Electronically signed by Freddie Piña MD at 9/17/2018  1:22 PM          Consult Notes (most recent note)      Jonel Orlando DO at 9/17/2018  2:20 PM      Consult Orders:    1. Inpatient Palliative Care MD Consult [[id number]] ordered by Freddie Piña MD at 09/17/18 1314                Lubna Robertson MD  Consulting physician: Wang    No chief complaint on file.        HPI: Patient is an 88-year-old female who is brought in hospital due to dyspnea.  Patient has apparently been having functional decline for the last 3-4 months getting to the point where she is homebound.  She does also use oxygen at home using about 2 L 27 but has required to be  bumped up to 3 L on occasions.  Patient was brought in hospital due to dyspnea and did have a right heart catheter performed.  Patient was also found to have pleural effusions probably did have a chest tube which came out and discussions of a repeat chest tube were had, however it was decided not to proceed with this.    Past Medical History:   Diagnosis Date   • Acute pericardial effusion 09/12/2017   • Anxiety    • Arthritis    • Asthma    • Atrial fibrillation (CMS/[facility]) 5/9/2017   • Atrial thrombus 06/2017    R atrial lead   • Bleeding ulcer     History of bleeding ulcer 15-20 years ago.   • Breast cancer (CMS/[facility])     left    • Chronic anticoagulation    • Chronic diastolic CHF (congestive heart failure) (CMS/[facility])    • Diverticula of colon    • Dyspnea on exertion 5/15/2017   • First degree AV block    • GERD (gastroesophageal reflux disease) 5/9/2017   • H. pylori infection 2011   • Hyperlipidemia 5/9/2017   • Hypertension 5/9/2017   • Hypothyroidism 5/9/2017   • Migraine 5/9/2017   • Osteoporosis 5/9/2017   • Oxygen dependent     2 L   • Pacemaker    • Pneumonia    • PVD (peripheral vascular disease) (CMS/[facility])    • Respiratory failure, post-operative (CMS/[facility]) 09/12/2017   • RLS (restless legs syndrome) 5/9/2017   • SSS (sick sinus syndrome) (CMS/[facility])    • Valvular heart disease 05/15/2017    Mitral stenosis with regurgitation   • Wears dentures    • Wears eyeglasses      Past Surgical History:   Procedure Laterality Date   • APPENDECTOMY     • BREAST SURGERY Left     radical mastectomy left    • CARDIAC CATHETERIZATION N/A 5/22/2017    Procedure: Left Heart Cath;  Surgeon: Natasha Hanna MD;  Location:  [facility];  Service:    • CARDIAC CATHETERIZATION N/A 6/13/2017    for mitral clip but unable to perform due to atrial thrombus   • CARDIAC CATHETERIZATION N/A 9/12/2017    Procedure: Abigail Clip;  Surgeon: Natasha Hanna MD;  Location:  [facility];  Service:     • CARDIAC CATHETERIZATION N/A 9/14/2018    Procedure: Right Heart Cath;  Surgeon: Natasha Hanna MD;  Location:  [facility];  Service: Cardiology   • CARDIAC ELECTROPHYSIOLOGY PROCEDURE N/A 12/14/2017    Procedure: AV node ablation;  Surgeon: Avery Breen MD;  Location:  [facility];  Service:    • CHOLECYSTECTOMY     • COLON RESECTION  08/17/2013    Colon resection for diverticulosis   • COLONOSCOPY     • EYE SURGERY Bilateral     cataract   • HYSTERECTOMY  1968   • MASTECTOMY Left 1989   • MITRAL VALVE SURGERY      mitraclip   • PACEMAKER IMPLANTATION  09/08/2005    with generator change 09/2011   • PERICARDIOCENTESIS  09/12/2017   • SHOULDER ARTHROSCOPY Left    • TEETH EXTRACTION       Current Code Status     Date Active Code Status Order ID Comments User Context       9/15/2018  8:12 AM No CPR [id number]  Freddie Piña MD Inpatient       Questions for Current Code Status     Question Answer Comment    Code Status No CPR     Medical Interventions (Level of Support Prior to Arrest) Limited     Limited Support to NOT Include Intubation      Artificial Nutrition         Current Facility-Administered Medications   Medication Dose Route Frequency Provider Last Rate Last Dose   • acetaminophen (TYLENOL) tablet 650 mg  650 mg Oral Q4H PRN Natasha Hanna MD   650 mg at 09/16/18 0009   • ALPRAZolam (XANAX) tablet 0.25 mg  0.25 mg Oral TID PRN Natasha Hanna MD   0.25 mg at 09/16/18 0009   • aspirin chewable tablet 81 mg  81 mg Oral Daily Natasha Hanna MD   81 mg at 09/17/18 0857   • atorvastatin (LIPITOR) tablet 10 mg  10 mg Oral Nightly Natasha Hanna MD   10 mg at 09/16/18 2113   • bumetanide (BUMEX) tablet 2 mg  2 mg Oral BID Pravin Donohue MD   2 mg at 09/17/18 0857   • cholecalciferol (VITAMIN D3) tablet 1,000 Units  1,000 Units Oral Daily Natasha Hanna MD   1,000 Units at 09/17/18 0857   • fluticasone  (FLONASE) 50 MCG/ACT nasal spray 2 spray  2 spray Nasal Daily Natasha Hanna MD   2 spray at 09/17/18 0858   • Hold medication  1 each Does not apply Continuous PRN Natasha Hanna MD       • HYDROcodone-acetaminophen (NORCO) 7.5-325 MG per tablet 1 tablet  1 tablet Oral Q4H PRN Natasha Hanna MD   1 tablet at 09/17/18 0256   • [START ON 9/18/2018] levothyroxine (SYNTHROID, LEVOTHROID) tablet 125 mcg  125 mcg Oral Q AM Freddie Piña MD       • magnesium hydroxide (MILK OF MAGNESIA) suspension 2400 mg/10mL 10 mL  10 mL Oral Daily PRN Natasha Hanna MD       • meloxicam (MOBIC) tablet 7.5 mg  7.5 mg Oral Nightly Natasha Hanna MD   7.5 mg at 09/16/18 2113   • metoprolol succinate XL (TOPROL-XL) 24 hr tablet 12.5 mg  12.5 mg Oral Daily Natasha Hanna MD   12.5 mg at 09/16/18 0942   • Morphine sulfate (PF) injection 1 mg  1 mg Intravenous Q4H PRN Natasha Hanna MD   1 mg at 09/17/18 1155    And   • naloxone (NARCAN) injection 0.4 mg  0.4 mg Intravenous Q5 Min PRN Natasha Hanna MD       • Daryl patch  - ADS Override Pull            • nortriptyline (PAMELOR) capsule 25 mg  25 mg Oral Nightly Natasha Hanna MD   25 mg at 09/16/18 2113   • O2 (OXYGEN)  3 L/min Inhalation See Admin Instructions Natasha Hanna MD       • pancrelipase (Lip-Prot-Amyl) (CREON) capsule 24,000 units of lipase  24,000 units of lipase Oral TID With Meals Natasha Hanna MD   24,000 units of lipase at 09/17/18 0857   • pantoprazole (PROTONIX) EC tablet 40 mg  40 mg Oral Q AM Natasha Hanna MD   40 mg at 09/17/18 0641   • [START ON 9/18/2018] potassium chloride (MICRO-K) CR capsule 20 mEq  20 mEq Oral Daily Natasha Hanna MD       • rOPINIRole (REQUIP) tablet 1 mg  1 mg Oral Q8H Natasha Hanna MD   1 mg at 09/17/18 0641   • sennosides-docusate sodium (SENOKOT-S) 8.6-50 MG tablet 2 tablet  2 tablet Oral Nightly  "Natasha Hanna MD   2 tablet at 09/16/18 2113   • vitamin B-12 (CYANOCOBALAMIN) tablet 1,000 mcg  1,000 mcg Oral Daily Natasha Hanna MD   1,000 mcg at 09/17/18 0857       hold 1 each     •  acetaminophen  •  ALPRAZolam  •  hold  •  HYDROcodone-acetaminophen  •  magnesium hydroxide  •  Morphine **AND** naloxone  Allergies   Allergen Reactions   • Doxycycline Hives     HIVES, RED FACE   • Fosamax [Alendronate] GI Intolerance   • Levofloxacin Hives     HIVES, RED FACE   • Metronidazole GI Intolerance     Family History   Problem Relation Age of Onset   • No Known Problems Mother    • Coronary artery disease Father    • Stroke Father    • Diabetes Sister    • Coronary artery disease Sister    • Lung cancer Brother      Social History     Social History   • Marital status:      Spouse name: N/A   • Number of children: 1   • Years of education: N/A     Occupational History   • [employer] Retired     Social History Main Topics   • Smoking status: Never Smoker   • Smokeless tobacco: Never Used   • Alcohol use No   • Drug use: No   • Sexual activity: Defer     Other Topics Concern   • Not on file     Social History Narrative    Lives in [place], [place] with daughter     Review of Systems - all others reviewed and found negative except as mentioned in the history of present illness      BP 99/45 (BP Location: Right arm, Patient Position: Sitting)   Pulse 71   Temp 98.4 °F (36.9 °C) (Oral)   Resp 16   Ht 167.6 cm (66\")   Wt 48 kg (105 lb 13.1 oz)   LMP  (LMP Unknown)   SpO2 100%   BMI 17.08 kg/m²      Intake/Output Summary (Last 24 hours) at 09/17/18 1420  Last data filed at 09/17/18 1200   Gross per 24 hour   Intake                0 ml   Output             2400 ml   Net            -2400 ml     Physical Exam:      General Appearance:    Alert, cooperative, frail appearing   Head:    Normocephalic, without obvious abnormality, atraumatic   Eyes:            Lids and " lashes normal, conjunctivae and sclerae normal, no   icterus, no pallor, corneas clear, PERRLA   Ears:    Ears appear intact with no abnormalities noted   Throat:   No oral lesions, no thrush, oral mucosa moist   Neck:   No adenopathy, supple, trachea midline, no thyromegaly, no     carotid bruit, no JVD   Back:     No kyphosis present, no scoliosis present, no skin lesions,       erythema or scars, no tenderness to percussion or                   palpation,   range of motion normal   Lungs:     Clear to auscultation,respirations regular, even and                   unlabored    Heart:    Regular rhythm and normal rate, normal S1 and S2, no            murmur, no gallop, no rub, no click   Breast Exam:    Deferred   Abdomen:     Normal bowel sounds, no masses, no organomegaly, soft        non-tender, non-distended, no guarding, no rebound                 tenderness   Genitalia:    Deferred   Extremities:   Moves all extremities well, no edema, no cyanosis, no              redness   Pulses:   Pulses palpable and equal bilaterally   Skin:   No bleeding, bruising or rash   Lymph nodes:   No palpable adenopathy   Neurologic:   Cranial nerves 2 - 12 grossly intact, sensation intact, DTR        present and equal bilaterally         Results from last 7 days  Lab Units 09/17/18  0654 09/16/18  0555   WBC 10*3/mm3  --  4.93   HEMOGLOBIN g/dL 8.8* 8.8*   HEMATOCRIT % 30.1* 30.9*   PLATELETS 10*3/mm3  --  174       Results from last 7 days  Lab Units 09/17/18  0654  09/15/18  0810   SODIUM mmol/L 139  < > 143   POTASSIUM mmol/L 3.8  < > 4.1   CHLORIDE mmol/L 95*  < > 101   CO2 mmol/L 36.0*  < > 34.0*   BUN mg/dL 25*  < > 20   CREATININE mg/dL 0.81  < > 0.73   CALCIUM mg/dL 8.3*  < > 8.7   BILIRUBIN mg/dL  --   --  0.7   ALK PHOS U/L  --   --  126*   ALT (SGPT) U/L  --   --  18   AST (SGOT) U/L  --   --  32   GLUCOSE mg/dL 116*  < > 100   < > = values in this interval not displayed.    Results from last 7 days  Lab Units  "09/17/18  0654   SODIUM mmol/L 139   POTASSIUM mmol/L 3.8   CHLORIDE mmol/L 95*   CO2 mmol/L 36.0*   BUN mg/dL 25*   CREATININE mg/dL 0.81   GLUCOSE mg/dL 116*   CALCIUM mg/dL 8.3*     Imaging Results (last 72 hours)     Procedure Component Value Units Date/Time    CT Guided Chest Tube [[id number]] Collected:  09/14/18 1634     Updated:  09/17/18 1326    Narrative:       EXAMINATION: CT GUIDED CHEST TUBE PLACEMENT- 09/14/2018     INDICATION: R06.02-Shortness of breath; R93.1-Abnormal findings on  diagnostic imaging of heart and coronary circulation         TECHNIQUE: Using CT guidance, local anesthesia and sterile technique, an  8.5 Amharic catheter was inserted into a right pleural effusion.     The radiation dose reduction device was turned on for each scan per the  ALARA (As Low as Reasonably Achievable) protocol.     COMPARISON: NONE     FINDINGS: Approximately 1400 cc of straw-colored fluid was removed.  Subsequent CT images demonstrated marked improvement in the pleural  effusion but without reexpansion of the left lung resulting in a  \"pneumothorax.\" Therefore the catheter was exchanged for a 10 Amharic  catheter which was inserted as a chest tube, sutured in position and the  patient returned to her room in satisfactory condition. Orders were  given for management of the chest tube.       Impression:       1400 cc of fluid was removed from a right thoracentesis.  Subsequently images demonstrated the lack of reexpansion of the right  lung and therefore a chest tube was inserted and a slightly larger 10  Amharic catheter was inserted as a chest tube. Once in her room orders  have been given to chest tube management.     D:  09/14/2018  E:  09/14/2018     This report was finalized on 9/17/2018 1:24 PM by Dr. Enio Gill MD.       XR Chest 1 View [[id number]] Collected:  09/17/18 0856     Updated:  09/17/18 1039    Narrative:       EXAMINATION: XR CHEST 1 VW- 09/17/2018     INDICATION: Respiratory Distress; " R06.02-Shortness of breath;  R93.1-Abnormal findings on diagnostic imaging of heart and coronary  circulation      COMPARISON: 09/16/2018     FINDINGS: Portable chest reveals small bilateral pleural effusions. Tiny  apical pneumothorax identified on the right. The right pleural effusion  is slightly increasing in size. The left pleural effusion is stable.  Heart is enlarged. Increased markings seen diffusely throughout the lung  fields suggesting chronic change. Cardiac pacer leads in satisfactory  position. Calcifications seen within the pleura of the left lung apex  which is stable.           Impression:       Slight increase seen in size of the right pleural effusion.  Chronic interstitial changes seen diffusely throughout the lung fields.  No change in size of left pleural effusion. Expansion pneumothorax  stable on the right.     D:  09/17/2018  E:  09/17/2018     This report was finalized on 9/17/2018 10:37 AM by Dr. Chloe Hathaway MD.       FL Video Swallow With Speech [[id number]] Updated:  09/17/18 0953    XR Chest 1 View [[id number]] Collected:  09/16/18 1628     Updated:  09/16/18 1712    Narrative:          EXAMINATION: XR CHEST 1 VW - 9/16/2018     INDICATION: R06.02-Shortness of breath; R93.1-Abnormal findings on  diagnostic imaging of heart and coronary circulation.      COMPARISON: 9/16/2018.     FINDINGS: Portable chest reveals small right reexpansion pneumothorax is  again present. The right pleural effusion is stable. Small left pleural  effusion. Increased markings at the right lung base. Degenerative change  is seen within the spine. The heart is enlarged.           Impression:       Stable reexpansion pneumothorax on the right with small  right pleural effusion. Left pleural effusion with enlargement of the  heart. No new focal parenchymal opacification present.     DICTATED:   9/16/2018  EDITED/ls :   9/16/2018      This report was finalized on 9/16/2018 5:10 PM by Dr. Corona  MD Rivas.       XR Chest 1 View [[id number]] Collected:  09/15/18 0822     Updated:  09/16/18 1055    Narrative:          EXAMINATION: XR CHEST 1 VW - 9/15/2018     INDICATION:  R06.02-Shortness of breath; R93.1-Abnormal findings on  diagnostic imaging of heart and coronary circulation.      COMPARISON: 9/14/2018.     FINDINGS: Portable chest reveals a chest tube identified on the right  with reexpansion pneumothorax present. A pleural effusion is present,  unchanged. The heart is enlarged. Cardiac pacer leads in satisfactory  position.           Impression:       Chest tube remains on the right with reexpansion  pneumothorax on the right. No evidence of a pleural effusion on the  right with no change in the pleural effusion on the left.     DICTATED:   9/15/2018  EDITED/ls :   9/15/2018      This report was finalized on 9/16/2018 10:53 AM by Dr. Chloe Hathaway MD.       XR Chest 1 View [[id number]] Collected:  09/16/18 0600     Updated:  09/16/18 0826    Narrative:       EXAM:    XR Chest, 1 View     EXAM DATE/TIME:    9/16/2018 6:00 AM     CLINICAL HISTORY:    88 years old, female; Shortness of breath; Abnormal findings on diagnostic   imaging of heart and coronary circulation; Pain; Other: Pulled out chest tube;   Additional info: Patient pulled out chest tube out this am     TECHNIQUE:    XR of the chest, 1 view.     COMPARISON:    CR - XR CHEST 1 VW 9/15/2018 5:50 AM     FINDINGS:    Tubes, catheters and devices:  The right chest tube has been removed. A   pacemaker device is present, and its leads are in appropriate position.    Lungs:  Stable left basilar airspace disease.    Pleural space:  Residual right pneumothorax, smaller at the right base than on   the prior examination. There is a stable left pleural effusion.    Heart/Mediastinum:  The heart demonstrates mild diffuse enlargement.    Bones/joints:  There are degenerative changes of the spine and shoulders.       Impression:       Residual right  pneumothorax following right chest tube removal, smaller at the   right base than on the prior examination. Otherwise stable chest.    THIS DOCUMENT HAS BEEN ELECTRONICALLY SIGNED BY BRYAN GORDON MD    XR Chest 1 View [[id number]] Collected:  09/14/18 1630     Updated:  09/14/18 1710    Narrative:       EXAMINATION: XR CHEST 1 VW-09/14/2018:      INDICATION: Post chest tube placement; R06.02-Shortness of breath;  R93.1-Abnormal findings on diagnostic imaging of heart and coronary  circulation.      COMPARISON: 09/13/2018.     FINDINGS: Portable chest reveals re-expansion pneumothorax identified on  the right. Right chest tubes in satisfactory position with decrease seen  in size of the right pleural effusion. No change seen in the moderate  sized left pleural effusion. The heart is enlarged.           Impression:       Re-expansion pneumothorax on the right. Decrease seen in  size with removal of the right pleural fluid. The left pleural fluid  collection is stable. Degenerative changes seen within the spine with  enlargement of the heart.     D:  09/14/2018  E:  09/14/2018     This report was finalized on 9/14/2018 5:08 PM by Dr. Chloe Hathaway MD.           Impression: CHF  Dyspnea  Mitral valve stenosis  Pulmonary HTN  GOC  Plan: Did have discussion with the patient as well as the patient's daughter who is at bedside.  We did discuss overall goals of care.  They state that he wanted focus primarily on keeping the patient as comfortable as possible.  I did briefly mentioned hospice and we'll consult the hospice case management.  We'll follow over the next 24-48 hours to see how the patient does and have further discussions based on her status.        Jonel Orlando DO  09/17/18  2:20 PM              Electronically signed by Jonel Orlando DO at 9/17/2018  2:23 PM       Physical Therapy Notes (most recent note)     No notes of this type exist for this encounter.        Occupational Therapy Notes (most  recent note)     No notes of this type exist for this encounter.           Speech Language Pathology Notes (most recent note)      Anila Hoyos MS CCC-SLP at 2018 11:00 AM          Acute Care - Speech Language Pathology   Swallow Initial Evaluation [facility]     Patient Name: Marilee Judge  : 6/3/1930  MRN: [id number]  Today's Date: 2018               Admit Date: 2018    Visit Dx:     ICD-10-CM ICD-9-CM   1. Pharyngeal dysphagia R13.13 787.23   2. Shortness of breath R06.02 786.05   3. Abnormal echocardiogram R93.1 793.2     Patient Active Problem List   Diagnosis   • Atrial fibrillation (CMS/HCC)   • Hypertension   • Hyperlipidemia   • Hypothyroidism   • History of breast cancer   • Osteoporosis   • Migraine   • GERD (gastroesophageal reflux disease)   • RLS (restless legs syndrome)   • Dyspnea on exertion   • Non-rheumatic mitral regurgitation, sp mitral clip procedure 17   • Acute pericardial effusion   • Peripheral vascular disease (CMS/HCC)   • Acute on chronic diastolic heart failure (CMS/HCC)   • Bilateral pleural effusion   • Persistent atrial fibrillation (CMS/HCC)   • Shortness of breath   • Mitral valve stenosis and regurgitation   • Other secondary pulmonary hypertension   • Iron deficiency anemia   • Postprocedural pneumothorax   • Dysphagia   • Failure to thrive in adult   • Malnutrition (CMS/HCC)   • Cellulitis of both feet     Past Medical History:   Diagnosis Date   • Acute pericardial effusion 2017   • Anxiety    • Arthritis    • Asthma    • Atrial fibrillation (CMS/HCC) 2017   • Atrial thrombus 2017    R atrial lead   • Bleeding ulcer     History of bleeding ulcer 15-20 years ago.   • Breast cancer (CMS/HCC)     left    • Chronic anticoagulation    • Chronic diastolic CHF (congestive heart failure) (CMS/HCC)    • Diverticula of colon    • Dyspnea on exertion 5/15/2017   • First degree AV block    • GERD (gastroesophageal reflux disease) 2017   • H.  pylori infection 2011   • Hyperlipidemia 5/9/2017   • Hypertension 5/9/2017   • Hypothyroidism 5/9/2017   • Migraine 5/9/2017   • Osteoporosis 5/9/2017   • Oxygen dependent     2 L   • Pacemaker    • Pneumonia    • PVD (peripheral vascular disease) (CMS/HCC)    • Respiratory failure, post-operative (CMS/HCC) 09/12/2017   • RLS (restless legs syndrome) 5/9/2017   • SSS (sick sinus syndrome) (CMS/HCC)    • Valvular heart disease 05/15/2017    Mitral stenosis with regurgitation   • Wears dentures    • Wears eyeglasses      Past Surgical History:   Procedure Laterality Date   • APPENDECTOMY     • BREAST SURGERY Left     radical mastectomy left    • CARDIAC CATHETERIZATION N/A 5/22/2017    Procedure: Left Heart Cath;  Surgeon: Natasha Hanna MD;  Location:  [facility];  Service:    • CARDIAC CATHETERIZATION N/A 6/13/2017    for mitral clip but unable to perform due to atrial thrombus   • CARDIAC CATHETERIZATION N/A 9/12/2017    Procedure: Abigail Clip;  Surgeon: Natasha Hanna MD;  Location:  [facility];  Service:    • CARDIAC CATHETERIZATION N/A 9/14/2018    Procedure: Right Heart Cath;  Surgeon: Natasha Hanna MD;  Location:  [facility];  Service: Cardiology   • CARDIAC ELECTROPHYSIOLOGY PROCEDURE N/A 12/14/2017    Procedure: AV node ablation;  Surgeon: Avery Breen MD;  Location:  [facility];  Service:    • CHOLECYSTECTOMY     • COLON RESECTION  08/17/2013    Colon resection for diverticulosis   • COLONOSCOPY     • EYE SURGERY Bilateral     cataract   • HYSTERECTOMY  1968   • MASTECTOMY Left 1989   • MITRAL VALVE SURGERY      mitraclip   • PACEMAKER IMPLANTATION  09/08/2005    with generator change 09/2011   • PERICARDIOCENTESIS  09/12/2017   • SHOULDER ARTHROSCOPY Left    • TEETH EXTRACTION            SWALLOW EVALUATION (last 72 hours)      SLP Adult Swallow Evaluation     Row Name 09/17/18 0900 09/15/18 1000                 Rehab Evaluation    Document Type evaluation  -LR evaluation  -AV       Subjective Information no complaints  -LR no complaints  -AV       Patient Observations alert;cooperative;agree to therapy  -LR alert;cooperative  -AV       Patient/Family Observations no family present  -LR daughter present  -AV       Patient Effort good  -LR good  -AV          General Information    Patient Profile Reviewed yes  -LR yes  -AV       Pertinent History Of Current Problem pt has h/o esophageal dilation 15-20 years ago per pt. Pt admitted with pleural effusion adn pneumothorax, pulm HTN  -LR esphageal dilation, lung difficulties, reflux, chest tube present on right, ? pneumathorax   -AV       Current Method of Nutrition regular textures;thin liquids  -LR regular textures;thin liquids  -AV       Precautions/Limitations, Vision WFL with corrective lenses  -LR WFL with corrective lenses  -AV       Precautions/Limitations, Hearing WFL;for purposes of eval  -LR WFL;for purposes of eval  -AV       Prior Level of Function-Communication WFL  -LR WFL  -AV       Prior Level of Function-Swallowing esophageal concerns  -LR esophageal concerns  -AV       Plans/Goals Discussed with patient  -LR patient;family  -AV       Barriers to Rehab none identified  -LR none identified  -AV       Patient's Goals for Discharge patient did not state  -LR return to all previous roles/activities  -AV       Family Goals for Discharge  -- patient able to return to all previous activities/roles  -AV          Pain Assessment    Additional Documentation  -- Pain Scale: FACES Pre/Post-Treatment (Group)  -AV          Pain Scale: FACES Pre/Post-Treatment    Pain: FACES Scale, Pretreatment  -- 0-->no hurt  -AV       Pain: FACES Scale, Post-Treatment  -- 0-->no hurt  -AV          Oral Motor and Function    Dentition Assessment  -- upper dentures/partial in place;lower dentures/partial in place  -AV       Secretion Management  -- WNL/WFL  -AV       Mucosal Quality  --  moist, healthy  -AV       Volitional Swallow  -- WFL  -AV       Volitional Cough  -- WFL  -AV          Oral Musculature and Cranial Nerve Assessment    Oral Motor General Assessment  -- WFL  -AV          General Eating/Swallowing Observations    Respiratory Support Currently in Use  -- nasal cannula  -AV       Eating/Swallowing Skills  -- self-fed  -AV       Positioning During Eating  -- upright 90 degree;upright in chair  -AV       Utensils Used  -- spoon;cup;straw  -AV       Consistencies Trialed  -- regular textures;pureed;thin liquids  -AV          Clinical Swallow Eval    Oral Prep Phase  -- WFL  -AV       Oral Transit  -- WFL  -AV       Oral Residue  -- WFL  -AV       Pharyngeal Phase  -- suspected pharyngeal impairment  -AV       Esophageal Phase  -- suspected esophageal impairment  -AV       Clinical Swallow Evaluation Summary  -- Bedside swallow eval this am:  patient reports history of esophageal difficulites.  c.o food sticking. DAughter reports trouble with liquids and solids at home.  no overt s/s with trials at bedside.  Rec MBS and limited UGI monday to further assess. patient and family in agreement.   -AV          MBS/VFSS    Utensils Used spoon;cup  -LR  --       Consistencies Trialed pureed;thin liquids;nectar/syrup-thick liquids;honey-thick liquids  -LR  --          MBS/VFSS Interpretation    Oral Prep Phase WFL  -LR  --       Oral Transit Phase WFL  -LR  --       Oral Residue WFL  -LR  --          Initiation of Pharyngeal Swallow    Initiation of Pharyngeal Swallow bolus in valleculae;bolus in pyriform sinuses  -LR  --       Pharyngeal Phase impaired pharyngeal phase of swallowing  -LR  --       Aspiration During the Swallow thin liquids;secondary to delayed swallow initiation or mistiming  -LR  --       Aspiration After the Swallow nectar-thick liquids;honey-thick liquids;pudding/puree;secondary to residue;in valleculae;in pyriform sinuses  -LR  --       Response to Aspiration no response,  silent aspiration  -LR  --       Rosenbek's Scale thin:;nectar:;honey:;pudding/puree:;8--->level 8  -LR  --       Pharyngeal Residue thin liquids;nectar-thick liquids;honey-thick liquids;pudding/puree;all consistencies tested;valleculae;pyriform sinuses;laryngeal vestibule;secondary to reduced base of tongue retraction;secondary to reduced posterior pharyngeal wall stripping;secondary to reduced laryngeal elevation;secondary to reduced hyolaryngeal excursion  -LR  --       Response to Residue unable to clear residue;cleared residue with cued swallow;cleared residue with compensatory maneuver (see comments)   unable to clear pharyngeal residue with effortful swallows  -LR  --       Attempted Compensatory Maneuvers chin tuck;multiple swallows;effortful (hard swallow);combination of maneuvers (see comments)  -LR  --       Response to Attempted Compensatory Maneuvers did not prevent aspiration;did not reduce residue  -LR  --       Pharyngeal Phase, Comment Pt silently aspirated thin liquids via cup during the swallow due to delayed initiation. Silent aspiration occurred after the swallow with nectar and honey thick liquids and pudding from mod to severe residue in the valleculae and pyriforms. Pt was unable to clear residue with cued multiple and effortful swallows. Multiple swallows resulted in further aspiration of the pharyngeal residue. Discussed results with MD who ordered to keep pt NPO for now until he discusses with pt.   -LR  --          Esophageal Phase    Esophageal Phase --   could not assess due to aspiration of all consistencies  -LR  --          Clinical Impression    SLP Swallowing Diagnosis functional oral phase;mod-severe;pharyngeal dysfunction  -LR functional oral phase;suspected pharyngeal dysfunction;esophageal dysfunction  -AV       Functional Impact risk of aspiration/pneumonia;risk of malnutrition  -LR risk of aspiration/pneumonia  -AV       Rehab Potential/Prognosis, Swallowing adequate, monitor  progress closely  -LR adequate, monitor progress closely  -AV       Swallow Criteria for Skilled Therapeutic Interventions Met demonstrates skilled criteria  -LR demonstrates skilled criteria  -AV          Recommendations    Therapy Frequency (Swallow) 5 days per week  -LR  --       Predicted Duration Therapy Intervention (Days) until discharge  -LR  --       SLP Diet Recommendation NPO   if comfort diet is desired recommend pureed & nectar liquids  -LR regular textures;thin liquids  -AV       Recommended Diagnostics  -- VFSS (MBS)  -AV       SLP Rec. for Method of Medication Administration  -- meds whole;with pudding or applesauce  -AV       Monitor for Signs of Aspiration  -- yes  -AV       Anticipated Dischage Disposition  -- home with assist  -AV         User Key  (r) = Recorded By, (t) = Taken By, (c) = Cosigned By    Initials Name Effective Dates    LR Anila Hoyos, MS CCC-SLP 06/22/15 -     AV Norma Stuart, MS CCC-SLP 04/03/18 -         EDUCATION  The patient has been educated in the following areas:   Dysphagia (Swallowing Impairment).    SLP Recommendation and Plan  SLP Swallowing Diagnosis: functional oral phase, mod-severe, pharyngeal dysfunction  SLP Diet Recommendation: NPO (if comfort diet is desired recommend pureed & nectar liquids)              Swallow Criteria for Skilled Therapeutic Interventions Met: demonstrates skilled criteria     Rehab Potential/Prognosis, Swallowing: adequate, monitor progress closely  Therapy Frequency (Swallow): 5 days per week  Predicted Duration Therapy Intervention (Days): until discharge       Plan of Care Reviewed With: patient  Plan of Care Review  Plan of Care Reviewed With: patient  Outcome Summary: MBS completed          SLP GOALS     Row Name 09/17/18 0900             Oral Nutrition/Hydration Goal 1 (SLP)    Oral Nutrition/Hydration Goal 1, SLP LTG: Pt with improve swallow function to be able to tolerate a modified dysphagia diet without aspiration by  discharge.  -LR      Time Frame (Oral Nutrition/Hydration Goal 1, SLP) --  -LR         Pharyngeal Strengthening Exercise Goal 1 (SLP)    Activity (Pharyngeal Strengthening Goal 1, SLP) increase superior movement of the hyolaryngeal complex;increase anterior movement of the hyolaryngeal complex;increase tongue base retraction;increase epiglottic inversion and retroflexion  -LR      Increase Superior Movement of the Hyolaryngeal Complex falsetto;hard effortful swallow;effortful pitch glide (falsetto + pharyngeal squeeze);chin tuck against resistance (CTAR);shaker;jose elias  -LR      Bates/Accuracy (Pharyngeal Strengthening Goal 1, SLP) with minimal cues (75-90% accuracy)  -LR      Time Frame (Pharyngeal Strengthening Goal 1, SLP) by discharge  -LR        User Key  (r) = Recorded By, (t) = Taken By, (c) = Cosigned By    Initials Name Provider Type    LR Anila Hoyos MS CCC-SLP Speech and Language Pathologist               Time Calculation:         Time Calculation- SLP     Row Name 09/17/18 1027             Time Calculation- SLP    SLP Start Time 0900  -LR      SLP Received On 09/17/18  -LR        User Key  (r) = Recorded By, (t) = Taken By, (c) = Cosigned By    Initials Name Provider Type    LR Anila Hoyos MS CCC-SLP Speech and Language Pathologist          Therapy Charges for Today     Code Description Service Date Service Provider Modifiers Qty    06928327226 HC ST SWALLOWING CURRENT STATUS 9/17/2018 Anila Hoyos MS CCC-SLP GN, CN 1    01339393984 HC ST SWALLOWING PROJECTED 9/17/2018 Anila Hoyos MS CCC-SLP GN, CM 1    24617262417 HC ST MOTION FLUORO EVAL SWALLOW 6 9/17/2018 Anila Hoyos MS CCC-SLP GN 1          SLP G-Codes  Functional Limitations: Swallowing  Swallow Current Status (): 100 percent impaired, limited or restricted  Swallow Goal Status (): At least 80 percent but less than 100 percent impaired, limited or restricted    Anial ARodrick Hoyos MS  CCC-SLP  9/17/2018    Electronically signed by Anila Hoyos MS CCC-SLP at 9/17/2018 11:00 AM

## 2018-09-17 NOTE — PLAN OF CARE
Problem: Patient Care Overview  Goal: Plan of Care Review  Outcome: Ongoing (interventions implemented as appropriate)   09/17/18 1672   Coping/Psychosocial   Plan of Care Reviewed With patient   Plan of Care Review   Progress improving   OTHER   Outcome Summary Pt. was pleasant and cooperative during the evening; getting up to bsc; using 4L of oxygen; 1700ml of output; Pt. legs hurting her-Loratab given once; continue to monitor.

## 2018-09-17 NOTE — CONSULTS
Lubna Robertson MD  Consulting physician: Wang    No chief complaint on file.        HPI: Patient is an 88-year-old female who is brought in hospital due to dyspnea.  Patient has apparently been having functional decline for the last 3-4 months getting to the point where she is homebound.  She does also use oxygen at home using about 2 L 27 but has required to be bumped up to 3 L on occasions.  Patient was brought in hospital due to dyspnea and did have a right heart catheter performed.  Patient was also found to have pleural effusions probably did have a chest tube which came out and discussions of a repeat chest tube were had, however it was decided not to proceed with this.    Past Medical History:   Diagnosis Date   • Acute pericardial effusion 09/12/2017   • Anxiety    • Arthritis    • Asthma    • Atrial fibrillation (CMS/Former[facility]) 5/9/2017   • Atrial thrombus 06/2017    R atrial lead   • Bleeding ulcer     History of bleeding ulcer 15-20 years ago.   • Breast cancer (CMS/Formerly [facility])     left    • Chronic anticoagulation    • Chronic diastolic CHF (congestive heart failure) (CMS/Formerly [facility])    • Diverticula of colon    • Dyspnea on exertion 5/15/2017   • First degree AV block    • GERD (gastroesophageal reflux disease) 5/9/2017   • H. pylori infection 2011   • Hyperlipidemia 5/9/2017   • Hypertension 5/9/2017   • Hypothyroidism 5/9/2017   • Migraine 5/9/2017   • Osteoporosis 5/9/2017   • Oxygen dependent     2 L   • Pacemaker    • Pneumonia    • PVD (peripheral vascular disease) (CMS/[facility])    • Respiratory failure, post-operative (CMS/Formerly [facility]) 09/12/2017   • RLS (restless legs syndrome) 5/9/2017   • SSS (sick sinus syndrome) (CMS/Former[facility])    • Valvular heart disease 05/15/2017    Mitral stenosis with regurgitation   • Wears dentures    • Wears eyeglasses      Past Surgical History:   Procedure Laterality Date   • APPENDECTOMY     • BREAST SURGERY Left     radical mastectomy left    • CARDIAC CATHETERIZATION N/A 5/22/2017    Procedure:  Left Heart Cath;  Surgeon: Natasha Hanna MD;  Location:  [facility];  Service:    • CARDIAC CATHETERIZATION N/A 6/13/2017    for mitral clip but unable to perform due to atrial thrombus   • CARDIAC CATHETERIZATION N/A 9/12/2017    Procedure: Abigail Clip;  Surgeon: Natasha Hanna MD;  Location:  [facility];  Service:    • CARDIAC CATHETERIZATION N/A 9/14/2018    Procedure: Right Heart Cath;  Surgeon: Natasha Hanna MD;  Location:  [facility];  Service: Cardiology   • CARDIAC ELECTROPHYSIOLOGY PROCEDURE N/A 12/14/2017    Procedure: AV node ablation;  Surgeon: Avery Breen MD;  Location:  [facility];  Service:    • CHOLECYSTECTOMY     • COLON RESECTION  08/17/2013    Colon resection for diverticulosis   • COLONOSCOPY     • EYE SURGERY Bilateral     cataract   • HYSTERECTOMY  1968   • MASTECTOMY Left 1989   • MITRAL VALVE SURGERY      mitraclip   • PACEMAKER IMPLANTATION  09/08/2005    with generator change 09/2011   • PERICARDIOCENTESIS  09/12/2017   • SHOULDER ARTHROSCOPY Left    • TEETH EXTRACTION       Current Code Status     Date Active Code Status Order ID Comments User Context       9/15/2018  8:12 AM No CPR [id number]  Freddie Piña MD Inpatient       Questions for Current Code Status     Question Answer Comment    Code Status No CPR     Medical Interventions (Level of Support Prior to Arrest) Limited     Limited Support to NOT Include Intubation      Artificial Nutrition         Current Facility-Administered Medications   Medication Dose Route Frequency Provider Last Rate Last Dose   • acetaminophen (TYLENOL) tablet 650 mg  650 mg Oral Q4H PRN Natasha Hanna MD   650 mg at 09/16/18 0009   • ALPRAZolam (XANAX) tablet 0.25 mg  0.25 mg Oral TID PRN Natasha Hanna MD   0.25 mg at 09/16/18 0009   • aspirin chewable tablet 81 mg  81 mg Oral Daily Natasha Hanna MD   81 mg at 09/17/18  0857   • atorvastatin (LIPITOR) tablet 10 mg  10 mg Oral Nightly Natasha Hanna MD   10 mg at 09/16/18 2113   • bumetanide (BUMEX) tablet 2 mg  2 mg Oral BID Pravin Donohue MD   2 mg at 09/17/18 0857   • cholecalciferol (VITAMIN D3) tablet 1,000 Units  1,000 Units Oral Daily Natasha Hanna MD   1,000 Units at 09/17/18 0857   • fluticasone (FLONASE) 50 MCG/ACT nasal spray 2 spray  2 spray Nasal Daily Natasha Hanna MD   2 spray at 09/17/18 0858   • Hold medication  1 each Does not apply Continuous PRN Natasha Hanna MD       • HYDROcodone-acetaminophen (NORCO) 7.5-325 MG per tablet 1 tablet  1 tablet Oral Q4H PRN Natasha Hanna MD   1 tablet at 09/17/18 0256   • [START ON 9/18/2018] levothyroxine (SYNTHROID, LEVOTHROID) tablet 125 mcg  125 mcg Oral Q AM Freddie Piña MD       • magnesium hydroxide (MILK OF MAGNESIA) suspension 2400 mg/10mL 10 mL  10 mL Oral Daily PRN Natasha Hanna MD       • meloxicam (MOBIC) tablet 7.5 mg  7.5 mg Oral Nightly Natasha Hanna MD   7.5 mg at 09/16/18 2113   • metoprolol succinate XL (TOPROL-XL) 24 hr tablet 12.5 mg  12.5 mg Oral Daily Natasha Hanna MD   12.5 mg at 09/16/18 0942   • Morphine sulfate (PF) injection 1 mg  1 mg Intravenous Q4H PRN Natasha Hanna MD   1 mg at 09/17/18 1155    And   • naloxone (NARCAN) injection 0.4 mg  0.4 mg Intravenous Q5 Min PRN Natasha Hanna MD       • Daryl patch  - ADS Override Pull            • nortriptyline (PAMELOR) capsule 25 mg  25 mg Oral Nightly Natasha Hanna MD   25 mg at 09/16/18 2113   • O2 (OXYGEN)  3 L/min Inhalation See Admin Instructions Natasha Hanna MD       • pancrelipase (Lip-Prot-Amyl) (CREON) capsule 24,000 units of lipase  24,000 units of lipase Oral TID With Meals Natasha Hanna MD   24,000 units of lipase at 09/17/18 0857   • pantoprazole (PROTONIX) EC tablet 40 mg  40 mg Oral Q AM  "Natasha Hanna MD   40 mg at 09/17/18 0641   • [START ON 9/18/2018] potassium chloride (MICRO-K) CR capsule 20 mEq  20 mEq Oral Daily Natasha Hanna MD       • rOPINIRole (REQUIP) tablet 1 mg  1 mg Oral Q8H Natasha Hanna MD   1 mg at 09/17/18 0641   • sennosides-docusate sodium (SENOKOT-S) 8.6-50 MG tablet 2 tablet  2 tablet Oral Nightly Natasha Hanna MD   2 tablet at 09/16/18 2113   • vitamin B-12 (CYANOCOBALAMIN) tablet 1,000 mcg  1,000 mcg Oral Daily Natasha Hanna MD   1,000 mcg at 09/17/18 0857       hold 1 each     •  acetaminophen  •  ALPRAZolam  •  hold  •  HYDROcodone-acetaminophen  •  magnesium hydroxide  •  Morphine **AND** naloxone  Allergies   Allergen Reactions   • Doxycycline Hives     HIVES, RED FACE   • Fosamax [Alendronate] GI Intolerance   • Levofloxacin Hives     HIVES, RED FACE   • Metronidazole GI Intolerance     Family History   Problem Relation Age of Onset   • No Known Problems Mother    • Coronary artery disease Father    • Stroke Father    • Diabetes Sister    • Coronary artery disease Sister    • Lung cancer Brother      Social History     Social History   • Marital status:      Spouse name: N/A   • Number of children: 1   • Years of education: N/A     Occupational History   • [employer] Retired     Social History Main Topics   • Smoking status: Never Smoker   • Smokeless tobacco: Never Used   • Alcohol use No   • Drug use: No   • Sexual activity: Defer     Other Topics Concern   • Not on file     Social History Narrative    Lives in [place], [place] with daughter     Review of Systems - all others reviewed and found negative except as mentioned in the history of present illness      BP 99/45 (BP Location: Right arm, Patient Position: Sitting)   Pulse 71   Temp 98.4 °F (36.9 °C) (Oral)   Resp 16   Ht 167.6 cm (66\")   Wt 48 kg (105 lb 13.1 oz)   LMP  (LMP Unknown)   SpO2 100%   BMI 17.08 kg/m² "     Intake/Output Summary (Last 24 hours) at 09/17/18 1420  Last data filed at 09/17/18 1200   Gross per 24 hour   Intake                0 ml   Output             2400 ml   Net            -2400 ml     Physical Exam:      General Appearance:    Alert, cooperative, frail appearing   Head:    Normocephalic, without obvious abnormality, atraumatic   Eyes:            Lids and lashes normal, conjunctivae and sclerae normal, no   icterus, no pallor, corneas clear, PERRLA   Ears:    Ears appear intact with no abnormalities noted   Throat:   No oral lesions, no thrush, oral mucosa moist   Neck:   No adenopathy, supple, trachea midline, no thyromegaly, no     carotid bruit, no JVD   Back:     No kyphosis present, no scoliosis present, no skin lesions,       erythema or scars, no tenderness to percussion or                   palpation,   range of motion normal   Lungs:     Clear to auscultation,respirations regular, even and                   unlabored    Heart:    Regular rhythm and normal rate, normal S1 and S2, no            murmur, no gallop, no rub, no click   Breast Exam:    Deferred   Abdomen:     Normal bowel sounds, no masses, no organomegaly, soft        non-tender, non-distended, no guarding, no rebound                 tenderness   Genitalia:    Deferred   Extremities:   Moves all extremities well, no edema, no cyanosis, no              redness   Pulses:   Pulses palpable and equal bilaterally   Skin:   No bleeding, bruising or rash   Lymph nodes:   No palpable adenopathy   Neurologic:   Cranial nerves 2 - 12 grossly intact, sensation intact, DTR        present and equal bilaterally         Results from last 7 days  Lab Units 09/17/18  0654 09/16/18  0555   WBC 10*3/mm3  --  4.93   HEMOGLOBIN g/dL 8.8* 8.8*   HEMATOCRIT % 30.1* 30.9*   PLATELETS 10*3/mm3  --  174       Results from last 7 days  Lab Units 09/17/18  0654  09/15/18  0810   SODIUM mmol/L 139  < > 143   POTASSIUM mmol/L 3.8  < > 4.1   CHLORIDE mmol/L 95*  " < > 101   CO2 mmol/L 36.0*  < > 34.0*   BUN mg/dL 25*  < > 20   CREATININE mg/dL 0.81  < > 0.73   CALCIUM mg/dL 8.3*  < > 8.7   BILIRUBIN mg/dL  --   --  0.7   ALK PHOS U/L  --   --  126*   ALT (SGPT) U/L  --   --  18   AST (SGOT) U/L  --   --  32   GLUCOSE mg/dL 116*  < > 100   < > = values in this interval not displayed.    Results from last 7 days  Lab Units 09/17/18  0654   SODIUM mmol/L 139   POTASSIUM mmol/L 3.8   CHLORIDE mmol/L 95*   CO2 mmol/L 36.0*   BUN mg/dL 25*   CREATININE mg/dL 0.81   GLUCOSE mg/dL 116*   CALCIUM mg/dL 8.3*     Imaging Results (last 72 hours)     Procedure Component Value Units Date/Time    CT Guided Chest Tube [[id number]] Collected:  09/14/18 1634     Updated:  09/17/18 1326    Narrative:       EXAMINATION: CT GUIDED CHEST TUBE PLACEMENT- 09/14/2018     INDICATION: R06.02-Shortness of breath; R93.1-Abnormal findings on  diagnostic imaging of heart and coronary circulation         TECHNIQUE: Using CT guidance, local anesthesia and sterile technique, an  8.5 Mexican catheter was inserted into a right pleural effusion.     The radiation dose reduction device was turned on for each scan per the  ALARA (As Low as Reasonably Achievable) protocol.     COMPARISON: NONE     FINDINGS: Approximately 1400 cc of straw-colored fluid was removed.  Subsequent CT images demonstrated marked improvement in the pleural  effusion but without reexpansion of the left lung resulting in a  \"pneumothorax.\" Therefore the catheter was exchanged for a 10 Mexican  catheter which was inserted as a chest tube, sutured in position and the  patient returned to her room in satisfactory condition. Orders were  given for management of the chest tube.       Impression:       1400 cc of fluid was removed from a right thoracentesis.  Subsequently images demonstrated the lack of reexpansion of the right  lung and therefore a chest tube was inserted and a slightly larger 10  Mexican catheter was inserted as a chest tube. Once " in her room orders  have been given to chest tube management.     D:  09/14/2018  E:  09/14/2018     This report was finalized on 9/17/2018 1:24 PM by Dr. Enio Gill MD.       XR Chest 1 View [[id number]] Collected:  09/17/18 0856     Updated:  09/17/18 1039    Narrative:       EXAMINATION: XR CHEST 1 VW- 09/17/2018     INDICATION: Respiratory Distress; R06.02-Shortness of breath;  R93.1-Abnormal findings on diagnostic imaging of heart and coronary  circulation      COMPARISON: 09/16/2018     FINDINGS: Portable chest reveals small bilateral pleural effusions. Tiny  apical pneumothorax identified on the right. The right pleural effusion  is slightly increasing in size. The left pleural effusion is stable.  Heart is enlarged. Increased markings seen diffusely throughout the lung  fields suggesting chronic change. Cardiac pacer leads in satisfactory  position. Calcifications seen within the pleura of the left lung apex  which is stable.           Impression:       Slight increase seen in size of the right pleural effusion.  Chronic interstitial changes seen diffusely throughout the lung fields.  No change in size of left pleural effusion. Expansion pneumothorax  stable on the right.     D:  09/17/2018  E:  09/17/2018     This report was finalized on 9/17/2018 10:37 AM by Dr. Chloe Hathaway MD.       FL Video Swallow With Speech [[id number]] Updated:  09/17/18 0953    XR Chest 1 View [[id number]] Collected:  09/16/18 1628     Updated:  09/16/18 1712    Narrative:          EXAMINATION: XR CHEST 1 VW - 9/16/2018     INDICATION: R06.02-Shortness of breath; R93.1-Abnormal findings on  diagnostic imaging of heart and coronary circulation.      COMPARISON: 9/16/2018.     FINDINGS: Portable chest reveals small right reexpansion pneumothorax is  again present. The right pleural effusion is stable. Small left pleural  effusion. Increased markings at the right lung base. Degenerative change  is seen within the spine. The  heart is enlarged.           Impression:       Stable reexpansion pneumothorax on the right with small  right pleural effusion. Left pleural effusion with enlargement of the  heart. No new focal parenchymal opacification present.     DICTATED:   9/16/2018  EDITED/ls :   9/16/2018      This report was finalized on 9/16/2018 5:10 PM by Dr. Chloe Hathaway MD.       XR Chest 1 View [[id number]] Collected:  09/15/18 0822     Updated:  09/16/18 1055    Narrative:          EXAMINATION: XR CHEST 1 VW - 9/15/2018     INDICATION:  R06.02-Shortness of breath; R93.1-Abnormal findings on  diagnostic imaging of heart and coronary circulation.      COMPARISON: 9/14/2018.     FINDINGS: Portable chest reveals a chest tube identified on the right  with reexpansion pneumothorax present. A pleural effusion is present,  unchanged. The heart is enlarged. Cardiac pacer leads in satisfactory  position.           Impression:       Chest tube remains on the right with reexpansion  pneumothorax on the right. No evidence of a pleural effusion on the  right with no change in the pleural effusion on the left.     DICTATED:   9/15/2018  EDITED/ls :   9/15/2018      This report was finalized on 9/16/2018 10:53 AM by Dr. Chloe Hathaway MD.       XR Chest 1 View [[id number]] Collected:  09/16/18 0600     Updated:  09/16/18 0826    Narrative:       EXAM:    XR Chest, 1 View     EXAM DATE/TIME:    9/16/2018 6:00 AM     CLINICAL HISTORY:    88 years old, female; Shortness of breath; Abnormal findings on diagnostic   imaging of heart and coronary circulation; Pain; Other: Pulled out chest tube;   Additional info: Patient pulled out chest tube out this am     TECHNIQUE:    XR of the chest, 1 view.     COMPARISON:    CR - XR CHEST 1 VW 9/15/2018 5:50 AM     FINDINGS:    Tubes, catheters and devices:  The right chest tube has been removed. A   pacemaker device is present, and its leads are in appropriate position.    Lungs:  Stable left basilar  airspace disease.    Pleural space:  Residual right pneumothorax, smaller at the right base than on   the prior examination. There is a stable left pleural effusion.    Heart/Mediastinum:  The heart demonstrates mild diffuse enlargement.    Bones/joints:  There are degenerative changes of the spine and shoulders.       Impression:       Residual right pneumothorax following right chest tube removal, smaller at the   right base than on the prior examination. Otherwise stable chest.    THIS DOCUMENT HAS BEEN ELECTRONICALLY SIGNED BY BRYAN GORDON MD    XR Chest 1 View [[id number]] Collected:  09/14/18 1630     Updated:  09/14/18 1710    Narrative:       EXAMINATION: XR CHEST 1 VW-09/14/2018:      INDICATION: Post chest tube placement; R06.02-Shortness of breath;  R93.1-Abnormal findings on diagnostic imaging of heart and coronary  circulation.      COMPARISON: 09/13/2018.     FINDINGS: Portable chest reveals re-expansion pneumothorax identified on  the right. Right chest tubes in satisfactory position with decrease seen  in size of the right pleural effusion. No change seen in the moderate  sized left pleural effusion. The heart is enlarged.           Impression:       Re-expansion pneumothorax on the right. Decrease seen in  size with removal of the right pleural fluid. The left pleural fluid  collection is stable. Degenerative changes seen within the spine with  enlargement of the heart.     D:  09/14/2018  E:  09/14/2018     This report was finalized on 9/14/2018 5:08 PM by Dr. Chloe Hathaway MD.           Impression: CHF  Dyspnea  Mitral valve stenosis  Pulmonary HTN  [facility]  Plan: Did have discussion with the patient as well as the patient's daughter who is at bedside.  We did discuss overall goals of care.  They state that he wanted focus primarily on keeping the patient as comfortable as possible.  I did briefly mentioned hospice and we'll consult the hospice case management.  We'll follow over the next 24-48  hours to see how the patient does and have further discussions based on her status.        Jonel Orlando,   09/17/18  2:20 PM

## 2018-09-17 NOTE — MBS/VFSS/FEES
Acute Care - Speech Language Pathology   Swallow Initial Evaluation [facility]     Patient Name: Marilee Judge  : 6/3/1930  MRN: [id number]  Today's Date: 2018               Admit Date: 2018    Visit Dx:     ICD-10-CM ICD-9-CM   1. Pharyngeal dysphagia R13.13 787.23   2. Shortness of breath R06.02 786.05   3. Abnormal echocardiogram R93.1 793.2     Patient Active Problem List   Diagnosis   • Atrial fibrillation (CMS/HCC)   • Hypertension   • Hyperlipidemia   • Hypothyroidism   • History of breast cancer   • Osteoporosis   • Migraine   • GERD (gastroesophageal reflux disease)   • RLS (restless legs syndrome)   • Dyspnea on exertion   • Non-rheumatic mitral regurgitation, sp mitral clip procedure 17   • Acute pericardial effusion   • Peripheral vascular disease (CMS/HCC)   • Acute on chronic diastolic heart failure (CMS/HCC)   • Bilateral pleural effusion   • Persistent atrial fibrillation (CMS/HCC)   • Shortness of breath   • Mitral valve stenosis and regurgitation   • Other secondary pulmonary hypertension   • Iron deficiency anemia   • Postprocedural pneumothorax   • Dysphagia   • Failure to thrive in adult   • Malnutrition (CMS/HCC)   • Cellulitis of both feet     Past Medical History:   Diagnosis Date   • Acute pericardial effusion 2017   • Anxiety    • Arthritis    • Asthma    • Atrial fibrillation (CMS/HCC) 2017   • Atrial thrombus 2017    R atrial lead   • Bleeding ulcer     History of bleeding ulcer 15-20 years ago.   • Breast cancer (CMS/HCC)     left    • Chronic anticoagulation    • Chronic diastolic CHF (congestive heart failure) (CMS/HCC)    • Diverticula of colon    • Dyspnea on exertion 5/15/2017   • First degree AV block    • GERD (gastroesophageal reflux disease) 2017   • H. pylori infection    • Hyperlipidemia 2017   • Hypertension 2017   • Hypothyroidism 2017   • Migraine 2017   • Osteoporosis 2017   • Oxygen dependent     2 L   •  Pacemaker    • Pneumonia    • PVD (peripheral vascular disease) (CMS/HCC)    • Respiratory failure, post-operative (CMS/[facility]) 09/12/2017   • RLS (restless legs syndrome) 5/9/2017   • SSS (sick sinus syndrome) (CMS/HCC)    • Valvular heart disease 05/15/2017    Mitral stenosis with regurgitation   • Wears dentures    • Wears eyeglasses      Past Surgical History:   Procedure Laterality Date   • APPENDECTOMY     • BREAST SURGERY Left     radical mastectomy left    • CARDIAC CATHETERIZATION N/A 5/22/2017    Procedure: Left Heart Cath;  Surgeon: Natasha Hanna MD;  Location:  [facility];  Service:    • CARDIAC CATHETERIZATION N/A 6/13/2017    for mitral clip but unable to perform due to atrial thrombus   • CARDIAC CATHETERIZATION N/A 9/12/2017    Procedure: Abigail Clip;  Surgeon: Natasha Hanna MD;  Location:  [facility];  Service:    • CARDIAC CATHETERIZATION N/A 9/14/2018    Procedure: Right Heart Cath;  Surgeon: Natasha Hanna MD;  Location:  [facility];  Service: Cardiology   • CARDIAC ELECTROPHYSIOLOGY PROCEDURE N/A 12/14/2017    Procedure: AV node ablation;  Surgeon: Avery Breen MD;  Location:  [facility];  Service:    • CHOLECYSTECTOMY     • COLON RESECTION  08/17/2013    Colon resection for diverticulosis   • COLONOSCOPY     • EYE SURGERY Bilateral     cataract   • HYSTERECTOMY  1968   • MASTECTOMY Left 1989   • MITRAL VALVE SURGERY      mitraclip   • PACEMAKER IMPLANTATION  09/08/2005    with generator change 09/2011   • PERICARDIOCENTESIS  09/12/2017   • SHOULDER ARTHROSCOPY Left    • TEETH EXTRACTION            SWALLOW EVALUATION (last 72 hours)      [facility] Adult Swallow Evaluation     Row Name 09/17/18 0900 09/15/18 1000                Rehab Evaluation    Document Type evaluation  -LR evaluation  -AV       Subjective Information no complaints  -LR no complaints  -AV       Patient Observations  alert;cooperative;agree to therapy  -LR alert;cooperative  -AV       Patient/Family Observations no family present  -LR daughter present  -AV       Patient Effort good  -LR good  -AV          General Information    Patient Profile Reviewed yes  -LR yes  -AV       Pertinent History Of Current Problem pt has h/o esophageal dilation 15-20 years ago per pt. Pt admitted with pleural effusion adn pneumothorax, pulm HTN  -LR esphageal dilation, lung difficulties, reflux, chest tube present on right, ? pneumathorax   -AV       Current Method of Nutrition regular textures;thin liquids  -LR regular textures;thin liquids  -AV       Precautions/Limitations, Vision WFL with corrective lenses  -LR WFL with corrective lenses  -AV       Precautions/Limitations, Hearing WFL;for purposes of eval  -LR WFL;for purposes of eval  -AV       Prior Level of Function-Communication WFL  -LR WFL  -AV       Prior Level of Function-Swallowing esophageal concerns  -LR esophageal concerns  -AV       Plans/Goals Discussed with patient  -LR patient;family  -AV       Barriers to Rehab none identified  -LR none identified  -AV       Patient's Goals for Discharge patient did not state  -LR return to all previous roles/activities  -AV       Family Goals for Discharge  — patient able to return to all previous activities/roles  -AV          Pain Assessment    Additional Documentation  — Pain Scale: FACES Pre/Post-Treatment (Group)  -AV          Pain Scale: FACES Pre/Post-Treatment    Pain: FACES Scale, Pretreatment  — 0-->no hurt  -AV       Pain: FACES Scale, Post-Treatment  — 0-->no hurt  -AV          Oral Motor and Function    Dentition Assessment  — upper dentures/partial in place;lower dentures/partial in place  -AV       Secretion Management  — WNL/WFL  -AV       Mucosal Quality  — moist, healthy  -AV       Volitional Swallow  — WFL  -AV       Volitional Cough  — WFL  -AV          Oral Musculature and Cranial Nerve Assessment    Oral Motor General  Assessment  — WFL  -AV          General Eating/Swallowing Observations    Respiratory Support Currently in Use  — nasal cannula  -AV       Eating/Swallowing Skills  — self-fed  -AV       Positioning During Eating  — upright 90 degree;upright in chair  -AV       Utensils Used  — spoon;cup;straw  -AV       Consistencies Trialed  — regular textures;pureed;thin liquids  -AV          Clinical Swallow Eval    Oral Prep Phase  — WFL  -AV       Oral Transit  — WFL  -AV       Oral Residue  — WFL  -AV       Pharyngeal Phase  — suspected pharyngeal impairment  -AV       Esophageal Phase  — suspected esophageal impairment  -AV       Clinical Swallow Evaluation Summary  — Bedside swallow eval this am:  patient reports history of esophageal difficulites.  c.o food sticking. DAughter reports trouble with liquids and solids at home.  no overt s/s with trials at bedside.  Rec MBS and limited UGI monday to further assess. patient and family in agreement.   -AV          MBS/VFSS    Utensils Used spoon;cup  -LR  —       Consistencies Trialed pureed;thin liquids;nectar/syrup-thick liquids;honey-thick liquids  -LR  —          MBS/VFSS Interpretation    Oral Prep Phase WFL  -LR  —       Oral Transit Phase WFL  -LR  —       Oral Residue WFL  -LR  —          Initiation of Pharyngeal Swallow    Initiation of Pharyngeal Swallow bolus in valleculae;bolus in pyriform sinuses  -LR  —       Pharyngeal Phase impaired pharyngeal phase of swallowing  -LR  —       Aspiration During the Swallow thin liquids;secondary to delayed swallow initiation or mistiming  -LR  —       Aspiration After the Swallow nectar-thick liquids;honey-thick liquids;pudding/puree;secondary to residue;in valleculae;in pyriform sinuses  -LR  —       Response to Aspiration no response, silent aspiration  -LR  —       Rosenbek's Scale thin:;nectar:;honey:;pudding/puree:;8--->level 8  -LR  —       Pharyngeal Residue thin liquids;nectar-thick liquids;honey-thick  liquids;pudding/puree;all consistencies tested;valleculae;pyriform sinuses;laryngeal vestibule;secondary to reduced base of tongue retraction;secondary to reduced posterior pharyngeal wall stripping;secondary to reduced laryngeal elevation;secondary to reduced hyolaryngeal excursion  -LR  —       Response to Residue unable to clear residue;cleared residue with cued swallow;cleared residue with compensatory maneuver (see comments)   unable to clear pharyngeal residue with effortful swallows  -LR  —       Attempted Compensatory Maneuvers chin tuck;multiple swallows;effortful (hard swallow);combination of maneuvers (see comments)  -LR  —       Response to Attempted Compensatory Maneuvers did not prevent aspiration;did not reduce residue  -LR  —       Pharyngeal Phase, Comment Pt silently aspirated thin liquids via cup during the swallow due to delayed initiation. Silent aspiration occurred after the swallow with nectar and honey thick liquids and pudding from mod to severe residue in the valleculae and pyriforms. Pt was unable to clear residue with cued multiple and effortful swallows. Multiple swallows resulted in further aspiration of the pharyngeal residue. Discussed results with MD who ordered to keep pt NPO for now until he discusses with pt.   -LR  —          Esophageal Phase    Esophageal Phase —   could not assess due to aspiration of all consistencies  -LR  —          Clinical Impression    SLP Swallowing Diagnosis functional oral phase;mod-severe;pharyngeal dysfunction  -LR functional oral phase;suspected pharyngeal dysfunction;esophageal dysfunction  -AV       Functional Impact risk of aspiration/pneumonia;risk of malnutrition  -LR risk of aspiration/pneumonia  -AV       Rehab Potential/Prognosis, Swallowing adequate, monitor progress closely  -LR adequate, monitor progress closely  -AV       Swallow Criteria for Skilled Therapeutic Interventions Met demonstrates skilled criteria  -LR demonstrates skilled  criteria  -AV          Recommendations    Therapy Frequency (Swallow) 5 days per week  -LR  —       Predicted Duration Therapy Intervention (Days) until discharge  -LR  —       SLP Diet Recommendation NPO   if comfort diet is desired recommend pureed and thin liquids  -LR regular textures;thin liquids  -AV       Recommended Diagnostics  — VFSS (MBS)  -AV       SLP Rec. for Method of Medication Administration  — meds whole;with pudding or applesauce  -AV       Monitor for Signs of Aspiration  — yes  -AV       Anticipated Dischage Disposition  — home with assist  -AV         User Key  (r) = Recorded By, (t) = Taken By, (c) = Cosigned By    Initials Name Effective Dates    LR Anila Hoyos, MS CCC-SLP 06/22/15 -     AV Norma Stuart, MS CCC-SLP 04/03/18 -         EDUCATION  The patient has been educated in the following areas:   Dysphagia (Swallowing Impairment).    SLP Recommendation and Plan  SLP Swallowing Diagnosis: functional oral phase, mod-severe, pharyngeal dysfunction  SLP Diet Recommendation: NPO (if comfort diet is desired recommend pureed and thin liquids)              Swallow Criteria for Skilled Therapeutic Interventions Met: demonstrates skilled criteria     Rehab Potential/Prognosis, Swallowing: adequate, monitor progress closely  Therapy Frequency (Swallow): 5 days per week  Predicted Duration Therapy Intervention (Days): until discharge       Plan of Care Reviewed With: patient  Plan of Care Review  Plan of Care Reviewed With: patient  Outcome Summary: MBS completed          SLP GOALS     Row Name 09/17/18 0900             Oral Nutrition/Hydration Goal 1 (SLP)    Oral Nutrition/Hydration Goal 1, SLP LTG: Pt with improve swallow function to be able to tolerate a modified dysphagia diet without aspiration by discharge.  -LR      Time Frame (Oral Nutrition/Hydration Goal 1, SLP) —  -LR         Pharyngeal Strengthening Exercise Goal 1 (SLP)    Activity (Pharyngeal Strengthening Goal 1, SLP) increase  superior movement of the hyolaryngeal complex;increase anterior movement of the hyolaryngeal complex;increase tongue base retraction;increase epiglottic inversion and retroflexion  -LR      Increase Superior Movement of the Hyolaryngeal Complex falsetto;hard effortful swallow;effortful pitch glide (falsetto + pharyngeal squeeze);chin tuck against resistance (CTAR);shaker;jose elias  -LR      Bosque/Accuracy (Pharyngeal Strengthening Goal 1, SLP) with minimal cues (75-90% accuracy)  -LR      Time Frame (Pharyngeal Strengthening Goal 1, SLP) by discharge  -LR        User Key  (r) = Recorded By, (t) = Taken By, (c) = Cosigned By    Initials Name Provider Type    Anila Jackson MS CCC-SLP Speech and Language Pathologist               Time Calculation:         Time Calculation- SLP     Row Name 09/17/18 1027             Time Calculation- SLP    SLP Start Time 0900  -LR      SLP Received On 09/17/18  -LR        User Key  (r) = Recorded By, (t) = Taken By, (c) = Cosigned By    Initials Name Provider Type    Anila Jackson MS CCC-SLP Speech and Language Pathologist          Therapy Charges for Today     Code Description Service Date Service Provider Modifiers Qty    70555355272 HC ST SWALLOWING CURRENT STATUS 9/17/2018 Anila Hoyos MS CCC-SLP GN, CN 1    95204159171 HC ST SWALLOWING PROJECTED 9/17/2018 Anila Hoyos MS CCC-SLP GN, CM 1    [id number] HC ST MOTION FLUORO EVAL SWALLOW 6 9/17/2018 Anila Hoyos MS CCC-SLP GN 1          SLP G-Codes  Functional Limitations: Swallowing  Swallow Current Status (): 100 percent impaired, limited or restricted  Swallow Goal Status (): At least 80 percent but less than 100 percent impaired, limited or restricted    MS BRIANDA Perez  9/17/2018

## 2018-09-17 NOTE — MBS/VFSS/FEES
Acute Care - Speech Language Pathology   Swallow Initial Evaluation [facility]     Patient Name: Marilee Judge  : 6/3/1930  MRN: [id number]  Today's Date: 2018               Admit Date: 2018    Visit Dx:     ICD-10-CM ICD-9-CM   1. Pharyngeal dysphagia R13.13 787.23   2. Shortness of breath R06.02 786.05   3. Abnormal echocardiogram R93.1 793.2     Patient Active Problem List   Diagnosis   • Atrial fibrillation (CMS/HCC)   • Hypertension   • Hyperlipidemia   • Hypothyroidism   • History of breast cancer   • Osteoporosis   • Migraine   • GERD (gastroesophageal reflux disease)   • RLS (restless legs syndrome)   • Dyspnea on exertion   • Non-rheumatic mitral regurgitation, sp mitral clip procedure 17   • Acute pericardial effusion   • Peripheral vascular disease (CMS/HCC)   • Acute on chronic diastolic heart failure (CMS/HCC)   • Bilateral pleural effusion   • Persistent atrial fibrillation (CMS/HCC)   • Shortness of breath   • Mitral valve stenosis and regurgitation   • Other secondary pulmonary hypertension   • Iron deficiency anemia   • Postprocedural pneumothorax   • Dysphagia   • Failure to thrive in adult   • Malnutrition (CMS/HCC)   • Cellulitis of both feet     Past Medical History:   Diagnosis Date   • Acute pericardial effusion 2017   • Anxiety    • Arthritis    • Asthma    • Atrial fibrillation (CMS/HCC) 2017   • Atrial thrombus 2017    R atrial lead   • Bleeding ulcer     History of bleeding ulcer 15-20 years ago.   • Breast cancer (CMS/HCC)     left    • Chronic anticoagulation    • Chronic diastolic CHF (congestive heart failure) (CMS/HCC)    • Diverticula of colon    • Dyspnea on exertion 5/15/2017   • First degree AV block    • GERD (gastroesophageal reflux disease) 2017   • H. pylori infection    • Hyperlipidemia 2017   • Hypertension 2017   • Hypothyroidism 2017   • Migraine 2017   • Osteoporosis 2017   • Oxygen dependent     2 L   •  Pacemaker    • Pneumonia    • PVD (peripheral vascular disease) (CMS/HCC)    • Respiratory failure, post-operative (CMS/[facility]) 09/12/2017   • RLS (restless legs syndrome) 5/9/2017   • SSS (sick sinus syndrome) (CMS/HCC)    • Valvular heart disease 05/15/2017    Mitral stenosis with regurgitation   • Wears dentures    • Wears eyeglasses      Past Surgical History:   Procedure Laterality Date   • APPENDECTOMY     • BREAST SURGERY Left     radical mastectomy left    • CARDIAC CATHETERIZATION N/A 5/22/2017    Procedure: Left Heart Cath;  Surgeon: Natasha Hanna MD;  Location:  [facility];  Service:    • CARDIAC CATHETERIZATION N/A 6/13/2017    for mitral clip but unable to perform due to atrial thrombus   • CARDIAC CATHETERIZATION N/A 9/12/2017    Procedure: Abigail Clip;  Surgeon: Natasha Hanna MD;  Location:  [facility];  Service:    • CARDIAC CATHETERIZATION N/A 9/14/2018    Procedure: Right Heart Cath;  Surgeon: Natasha Hanna MD;  Location:  [facility];  Service: Cardiology   • CARDIAC ELECTROPHYSIOLOGY PROCEDURE N/A 12/14/2017    Procedure: AV node ablation;  Surgeon: Avery Breen MD;  Location:  [facility];  Service:    • CHOLECYSTECTOMY     • COLON RESECTION  08/17/2013    Colon resection for diverticulosis   • COLONOSCOPY     • EYE SURGERY Bilateral     cataract   • HYSTERECTOMY  1968   • MASTECTOMY Left 1989   • MITRAL VALVE SURGERY      mitraclip   • PACEMAKER IMPLANTATION  09/08/2005    with generator change 09/2011   • PERICARDIOCENTESIS  09/12/2017   • SHOULDER ARTHROSCOPY Left    • TEETH EXTRACTION            SWALLOW EVALUATION (last 72 hours)      [facility] Adult Swallow Evaluation     Row Name 09/17/18 0900 09/15/18 1000                Rehab Evaluation    Document Type evaluation  -LR evaluation  -AV       Subjective Information no complaints  -LR no complaints  -AV       Patient Observations  alert;cooperative;agree to therapy  -LR alert;cooperative  -AV       Patient/Family Observations no family present  -LR daughter present  -AV       Patient Effort good  -LR good  -AV          General Information    Patient Profile Reviewed yes  -LR yes  -AV       Pertinent History Of Current Problem pt has h/o esophageal dilation 15-20 years ago per pt. Pt admitted with pleural effusion adn pneumothorax, pulm HTN  -LR esphageal dilation, lung difficulties, reflux, chest tube present on right, ? pneumathorax   -AV       Current Method of Nutrition regular textures;thin liquids  -LR regular textures;thin liquids  -AV       Precautions/Limitations, Vision WFL with corrective lenses  -LR WFL with corrective lenses  -AV       Precautions/Limitations, Hearing WFL;for purposes of eval  -LR WFL;for purposes of eval  -AV       Prior Level of Function-Communication WFL  -LR WFL  -AV       Prior Level of Function-Swallowing esophageal concerns  -LR esophageal concerns  -AV       Plans/Goals Discussed with patient  -LR patient;family  -AV       Barriers to Rehab none identified  -LR none identified  -AV       Patient's Goals for Discharge patient did not state  -LR return to all previous roles/activities  -AV       Family Goals for Discharge  -- patient able to return to all previous activities/roles  -AV          Pain Assessment    Additional Documentation  -- Pain Scale: FACES Pre/Post-Treatment (Group)  -AV          Pain Scale: FACES Pre/Post-Treatment    Pain: FACES Scale, Pretreatment  -- 0-->no hurt  -AV       Pain: FACES Scale, Post-Treatment  -- 0-->no hurt  -AV          Oral Motor and Function    Dentition Assessment  -- upper dentures/partial in place;lower dentures/partial in place  -AV       Secretion Management  -- WNL/WFL  -AV       Mucosal Quality  -- moist, healthy  -AV       Volitional Swallow  -- WFL  -AV       Volitional Cough  -- WFL  -AV          Oral Musculature and Cranial Nerve Assessment    Oral Motor  General Assessment  -- WFL  -AV          General Eating/Swallowing Observations    Respiratory Support Currently in Use  -- nasal cannula  -AV       Eating/Swallowing Skills  -- self-fed  -AV       Positioning During Eating  -- upright 90 degree;upright in chair  -AV       Utensils Used  -- spoon;cup;straw  -AV       Consistencies Trialed  -- regular textures;pureed;thin liquids  -AV          Clinical Swallow Eval    Oral Prep Phase  -- WFL  -AV       Oral Transit  -- WFL  -AV       Oral Residue  -- WFL  -AV       Pharyngeal Phase  -- suspected pharyngeal impairment  -AV       Esophageal Phase  -- suspected esophageal impairment  -AV       Clinical Swallow Evaluation Summary  -- Bedside swallow eval this am:  patient reports history of esophageal difficulites.  c.o food sticking. DAughter reports trouble with liquids and solids at home.  no overt s/s with trials at bedside.  Rec MBS and limited UGI monday to further assess. patient and family in agreement.   -AV          MBS/VFSS    Utensils Used spoon;cup  -LR  --       Consistencies Trialed pureed;thin liquids;nectar/syrup-thick liquids;honey-thick liquids  -LR  --          MBS/VFSS Interpretation    Oral Prep Phase WFL  -LR  --       Oral Transit Phase WFL  -LR  --       Oral Residue WFL  -LR  --          Initiation of Pharyngeal Swallow    Initiation of Pharyngeal Swallow bolus in valleculae;bolus in pyriform sinuses  -LR  --       Pharyngeal Phase impaired pharyngeal phase of swallowing  -LR  --       Aspiration During the Swallow thin liquids;secondary to delayed swallow initiation or mistiming  -LR  --       Aspiration After the Swallow nectar-thick liquids;honey-thick liquids;pudding/puree;secondary to residue;in valleculae;in pyriform sinuses  -LR  --       Response to Aspiration no response, silent aspiration  -LR  --       Rosenbek's Scale thin:;nectar:;honey:;pudding/puree:;8--->level 8  -LR  --       Pharyngeal Residue thin liquids;nectar-thick  liquids;honey-thick liquids;pudding/puree;all consistencies tested;valleculae;pyriform sinuses;laryngeal vestibule;secondary to reduced base of tongue retraction;secondary to reduced posterior pharyngeal wall stripping;secondary to reduced laryngeal elevation;secondary to reduced hyolaryngeal excursion  -LR  --       Response to Residue unable to clear residue;cleared residue with cued swallow;cleared residue with compensatory maneuver (see comments)   unable to clear pharyngeal residue with effortful swallows  -LR  --       Attempted Compensatory Maneuvers chin tuck;multiple swallows;effortful (hard swallow);combination of maneuvers (see comments)  -LR  --       Response to Attempted Compensatory Maneuvers did not prevent aspiration;did not reduce residue  -LR  --       Pharyngeal Phase, Comment Pt silently aspirated thin liquids via cup during the swallow due to delayed initiation. Silent aspiration occurred after the swallow with nectar and honey thick liquids and pudding from mod to severe residue in the valleculae and pyriforms. Pt was unable to clear residue with cued multiple and effortful swallows. Multiple swallows resulted in further aspiration of the pharyngeal residue. Discussed results with MD who ordered to keep pt NPO for now until he discusses with pt.   -LR  --          Esophageal Phase    Esophageal Phase --   could not assess due to aspiration of all consistencies  -LR  --          Clinical Impression    SLP Swallowing Diagnosis functional oral phase;mod-severe;pharyngeal dysfunction  -LR functional oral phase;suspected pharyngeal dysfunction;esophageal dysfunction  -AV       Functional Impact risk of aspiration/pneumonia;risk of malnutrition  -LR risk of aspiration/pneumonia  -AV       Rehab Potential/Prognosis, Swallowing adequate, monitor progress closely  -LR adequate, monitor progress closely  -AV       Swallow Criteria for Skilled Therapeutic Interventions Met demonstrates skilled criteria   -LR demonstrates skilled criteria  -AV          Recommendations    Therapy Frequency (Swallow) 5 days per week  -LR  --       Predicted Duration Therapy Intervention (Days) until discharge  -LR  --       SLP Diet Recommendation NPO   if comfort diet is desired recommend pureed & nectar liquids  -LR regular textures;thin liquids  -AV       Recommended Diagnostics  -- VFSS (MBS)  -AV       SLP Rec. for Method of Medication Administration  -- meds whole;with pudding or applesauce  -AV       Monitor for Signs of Aspiration  -- yes  -AV       Anticipated Dischage Disposition  -- home with assist  -AV         User Key  (r) = Recorded By, (t) = Taken By, (c) = Cosigned By    Initials Name Effective Dates    LR Anila Hoyos, MS CCC-SLP 06/22/15 -     AV Norma Stuart, MS CCC-SLP 04/03/18 -         EDUCATION  The patient has been educated in the following areas:   Dysphagia (Swallowing Impairment).    SLP Recommendation and Plan  SLP Swallowing Diagnosis: functional oral phase, mod-severe, pharyngeal dysfunction  SLP Diet Recommendation: NPO (if comfort diet is desired recommend pureed & nectar liquids)              Swallow Criteria for Skilled Therapeutic Interventions Met: demonstrates skilled criteria     Rehab Potential/Prognosis, Swallowing: adequate, monitor progress closely  Therapy Frequency (Swallow): 5 days per week  Predicted Duration Therapy Intervention (Days): until discharge       Plan of Care Reviewed With: patient  Plan of Care Review  Plan of Care Reviewed With: patient  Outcome Summary: MBS completed          SLP GOALS     Row Name 09/17/18 0900             Oral Nutrition/Hydration Goal 1 (SLP)    Oral Nutrition/Hydration Goal 1, SLP LTG: Pt with improve swallow function to be able to tolerate a modified dysphagia diet without aspiration by discharge.  -LR      Time Frame (Oral Nutrition/Hydration Goal 1, SLP) --  -LR         Pharyngeal Strengthening Exercise Goal 1 (SLP)    Activity (Pharyngeal  Strengthening Goal 1, SLP) increase superior movement of the hyolaryngeal complex;increase anterior movement of the hyolaryngeal complex;increase tongue base retraction;increase epiglottic inversion and retroflexion  -LR      Increase Superior Movement of the Hyolaryngeal Complex falsetto;hard effortful swallow;effortful pitch glide (falsetto + pharyngeal squeeze);chin tuck against resistance (CTAR);shaker;jose elias  -LR      [place]/Accuracy (Pharyngeal Strengthening Goal 1, SLP) with minimal cues (75-90% accuracy)  -LR      Time Frame (Pharyngeal Strengthening Goal 1, SLP) by discharge  -LR        User Key  (r) = Recorded By, (t) = Taken By, (c) = Cosigned By    Initials Name Provider Type    Anila Jackson MS CCC-SLP Speech and Language Pathologist               Time Calculation:         Time Calculation- SLP     Row Name 09/17/18 1027             Time Calculation- SLP    SLP Start Time 0900  -LR      SLP Received On 09/17/18  -LR        User Key  (r) = Recorded By, (t) = Taken By, (c) = Cosigned By    Initials Name Provider Type    Anila Jackson MS CCC-SLP Speech and Language Pathologist          Therapy Charges for Today     Code Description Service Date Service Provider Modifiers Qty    91497056137 HC ST SWALLOWING CURRENT STATUS 9/17/2018 Anila Hoyos MS CCC-SLP GN, CN 1    87500194627 HC ST SWALLOWING PROJECTED 9/17/2018 Anila Hoyos MS CCC-SLP GN, CM 1    25489509803 HC ST MOTION FLUORO EVAL SWALLOW 6 9/17/2018 Anila Hoyos MS CCC-SLP GN 1          SLP G-Codes  Functional Limitations: Swallowing  Swallow Current Status (): 100 percent impaired, limited or restricted  Swallow Goal Status (): At least 80 percent but less than 100 percent impaired, limited or restricted    MS BRIANDA Perez  9/17/2018

## 2018-09-17 NOTE — PROGRESS NOTES
PULMONARY NOTE     Hospital Day: 2    Ms. Marilee Judge, 88 y.o. female is followed for:   Pleural effusion and pneumothorax         SUBJECTIVE     88-year-old female with a history of congestive heart failure and valvular heart disease consisting primarily of mitral regurgitation.  She also has a history of atrial fibrillation and sick sinus syndrome and has had a pacemaker placed.  She is status post a Mitraclip procedure one year ago.     She is a lifelong nonsmoker and has no history of lung disease.     She has had intermittent shortness of breath for the last year.  She has been on varying amounts of supplemental oxygen.  She recently had increasing amounts of shortness of breath prompting admission to the hospital in [place].  She was told she had pulmonary hypertension and pleural fluid.  Follow-up was arranged with Dr. Hanna.     She underwent right-sided cardiac catheterization yesterday which revealed a decreased cardiac output, mean PA pressure of 30, and a pulmonary capillary wedge pressure of 18 with a 12 mm V wave.     Chest x-ray revealed right greater than left pleural effusions.  These were present in 2017 though her more sizable on the most recent films.  A right-sided thoracentesis was performed in radiology.  This resulted in a non-expanded right lung and pneumothorax and a pigtail right chest tube was placed.  A small air leak was present at the time of my initial consultation on 9/15    Interval history:    Chest tube was inadvertently pulled out early this morning hitting to the bedside commode.  Chest x-ray reveals an actual decrease in the size of the pleural air on the right.  A follow-up chest x-ray 6 hours later revealed stability of the pneumothorax.  She remains perfectly comfortable.    The patient's relevant past medical, surgical and social history were reviewed and updated in Epic as appropriate.        OBJECTIVE     Vital Sign Min/Max for last 24 hours  Temp  Min: 97.7  "°F (36.5 °C)  Max: 98.4 °F (36.9 °C)   BP  Min: 99/45  Max: 119/55   Pulse  Min: 71  Max: 94   Resp  Min: 16  Max: 22   SpO2  Min: 100 %  Max: 100 %   No Data Recorded   No Data Recorded      Intake/Output Summary (Last 24 hours) at 09/17/18 1746  Last data filed at 09/17/18 1200   Gross per 24 hour   Intake                0 ml   Output             2000 ml   Net            -2000 ml      Flowsheet Rows      First Filed Value   Admission Height  167.6 cm (66\") Documented at 09/14/2018 0916   Admission Weight  48 kg (105 lb 13.1 oz) Documented at 09/14/2018 0916        Body mass index is 17.08 kg/m². 1    09/14/18  0916   Weight: 48 kg (105 lb 13.1 oz)         hold 1 each        Objective:  General Appearance:  In no acute distress (Thin elderly female).    Vital signs: (most recent): Blood pressure 119/55, pulse 81, temperature 98 °F (36.7 °C), temperature source Oral, resp. rate 16, height 167.6 cm (66\"), weight 48 kg (105 lb 13.1 oz), SpO2 100 %.  No fever.    Output: Producing urine.    HEENT: (Temporal wasting. KAROLINE, no jaundice. Oral mucosa moist)    Lungs:  Normal effort and normal respiratory rate.  She is not in respiratory distress.  There are decreased breath sounds.  No rales, wheezes or rhonchi.  (Decreased breath sounds right side)  Heart: Normal rate.  Regular rhythm.  S1 normal and S2 normal.  Positive for murmur.  No gallop or friction rub.   Chest: Symmetric chest wall expansion.   Abdomen: Abdomen is soft and non-distended.  Bowel sounds are normal.   There is no abdominal tenderness.   There is no mass. There is no splenomegaly. There is no hepatomegaly.   Extremities: There is dependent edema.  There is no deformity.    Neurological: Patient is alert and oriented to person, place and time.    Pupils:  Pupils are equal, round, and reactive to light.    Skin:  Warm and dry.                      I reviewed the patient's results and images, including reviewing images and reports.    Medications " reviewed.      Scheduled Meds:    aspirin 81 mg Oral Daily   atorvastatin 10 mg Oral Nightly   bumetanide 2 mg Oral BID   cholecalciferol 1,000 Units Oral Daily   fluticasone 2 spray Nasal Daily   [START ON 9/18/2018] levothyroxine 125 mcg Oral Q AM   meloxicam 7.5 mg Oral Nightly   metoprolol succinate XL 12.5 mg Oral Daily   Daryl      nortriptyline 25 mg Oral Nightly   O2 3 L/min Inhalation See Admin Instructions   pancrelipase (Lip-Prot-Amyl) 24,000 units of lipase Oral TID With Meals   pantoprazole 40 mg Oral Q AM   [START ON 9/18/2018] potassium chloride 20 mEq Oral Daily   rOPINIRole 1 mg Oral Q8H   sennosides-docusate sodium 2 tablet Oral Nightly   vitamin B-12 1,000 mcg Oral Daily         Assessment/Plan   ASSESSMENT      Hospital Problem List     * (Principal)Shortness of breath    Overview Deleted 9/15/2018  6:27 PM by Pravin Donohue MD            Atrial fibrillation (CMS/[facility])    Overview Signed 5/9/2017  8:53 PM by Kathrine Moss     1. Atrial fibrillation/sick sinus syndrome:  a. 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  b. Status post dual-chamber [employer]tronic N Rhythm pacemaker, 09/08/2005 (implanted on the right side).  c. Rythmol therapy since, 09/08/2005.  d. Chronic anticoagulation with Coumadin with a CHADS score = 2.  e. Normal LV function and left atrial dimension by echocardiogram, July 2005.  f. Negative exercise Cardiolite, January 2008; negative adenosine Cardiolite, June 2005; negative stress echocardiogram, April 2004.  g. First-degree AV block.  h. Generator change, September 2011, in [place], [place].         Hypertension    Hyperlipidemia    Hypothyroidism    Overview Signed 5/9/2017  8:54 PM by Kathrine Moss     2. Hypothyroidism, on chronic replacement therapy.           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  She has group 2 secondary pulmonary hypertension.  Right heart catheterization revealed a PA pressure of 50/18 with a mean of 30 and a wedge pressure of 18. Cardiac index was only 2.1.    She has had persistent and enlarging bilateral pleural effusions and had a thoracentesis performed with a non-expanded right lung requiring chest tube.  That has since been pulled out inadvertently but her pneumothorax is small enough and stable.  As expected effusion is re-accumulating.     I had a denis discussion with her about her very limited options. She is too debilitated to undergo a decortication procedure or even a Pleurx catheter. In addition, swallow evaluation revealed silent aspiration. She is clearly underweight. She really does not wish invasive things such as feeding tubes.        RECOMMENDATIONS     1. Decision for no invasive procedure such as Pleurx catheter or decortication  2. Concur with palliative care evaluation for goals and/or hospice  3. Oxygen for symptomatic dyspnea  4. Comfort diet  5. Oral diuretics  6. Need to speak with PharmD, difficulty swallowing Creon         I discussed the patient's findings and my recommendations with patient, family and nursing staff         Stacy Gibbons MD  Pulmonary and Critical Care Medicine

## 2018-09-17 NOTE — PLAN OF CARE
Problem: Patient Care Overview  Goal: Plan of Care Review  Outcome: Ongoing (interventions implemented as appropriate)   09/17/18 8469   Coping/Psychosocial   Plan of Care Reviewed With patient;daughter   OTHER   Outcome Summary D/C speech servcies   Pt and daughter have chosen to proceed with a comfort diet despite aspiration risk. Pt and daughter verbalized risks associated with aspiration. Educated pt and daughter on aspiration precautions and techniques to reduce aspiration risk. Will sign off for dysphagia. Please reconsult if speech services are needed.

## 2018-09-17 NOTE — PROGRESS NOTES
"[facility] Daily Note       LOS: 2 days   Patient Care Team:  Lubna Robertson MD as PCP - General (Internal Medicine)  Eugene Langston MD as Consulting Physician (Internal Medicine)    Chief Complaint:  dyspnea/afib    Subjective   less short of breath.  Currently lying in bed.  Her daughter is at her bedside.  She was 100% on 4 L of oxygen when I walked in.  Took her oxygen off and she fell to 91-92 percent on room air.      Review of Systems:   As above.    Medications:    aspirin 81 mg Oral Daily   atorvastatin 10 mg Oral Nightly   bumetanide 2 mg Oral BID   ceftriaxone 1 g Intravenous Q24H   cholecalciferol 1,000 Units Oral Daily   ferric gluconate (FERRLECIT) IVPB 62.5 mg Intravenous Daily   fluticasone 2 spray Nasal Daily   [START ON 9/18/2018] levothyroxine 125 mcg Oral Q AM   meloxicam 7.5 mg Oral Nightly   metoprolol succinate XL 12.5 mg Oral Daily   Daryl      nortriptyline 25 mg Oral Nightly   O2 3 L/min Inhalation See Admin Instructions   pancrelipase (Lip-Prot-Amyl) 24,000 units of lipase Oral TID With Meals   pantoprazole 40 mg Oral Q AM   potassium chloride 10 mEq Oral Daily   rOPINIRole 1 mg Oral Q8H   sennosides-docusate sodium 2 tablet Oral Nightly   vitamin B-12 1,000 mcg Oral Daily       Objective     Vital Sign Min/Max for last 24 hours  Temp  Min: 97 °F (36.1 °C)  Max: 97.9 °F (36.6 °C)   BP  Min: 99/49  Max: 119/45   Pulse  Min: 69  Max: 94   Resp  Min: 16  Max: 22   SpO2  Min: 100 %  Max: 100 %   Flow (L/min)  Min: 3  Max: 4   No Data Recorded      Intake/Output Summary (Last 24 hours) at 09/17/18 0840  Last data filed at 09/17/18 0457   Gross per 24 hour   Intake              480 ml   Output             2650 ml   Net            -2170 ml        Flowsheet Rows      First Filed Value   Admission Height  167.6 cm (66\") Documented at 09/14/2018 0916   Admission Weight  48 kg (105 lb 13.1 oz) Documented at 09/14/2018 0916          Physical Exam:    General: Alert and oriented. "   Cardiovascular: Heart has a nondisplaced focal PMI. Regular rate and rhythm without murmur, gallop or rub.  Lungs: Clear without rales or wheezes. Equal expansion is noted.   Abdomen: Soft, nontender.  Extremities: Show no edema.   Skin: warm and dry.     Results Review:    I reviewed the patient's new clinical results.  EKG:  Tele: V paced    Labs:      Results from last 7 days  Lab Units 09/17/18  0654 09/16/18  0555 09/15/18  0810 09/13/18  1243   SODIUM mmol/L 139 141 143 139   POTASSIUM mmol/L 3.8 4.1 4.1 3.9   CHLORIDE mmol/L 95* 97* 101 95*   CO2 mmol/L 36.0* 40.0* 34.0* 39.0*   BUN mg/dL 25* 23 20 21   CREATININE mg/dL 0.81 0.95 0.73 0.82   CALCIUM mg/dL 8.3* 8.6* 8.7 9.0   BILIRUBIN mg/dL  --   --  0.7 0.6   ALK PHOS U/L  --   --  126* 131*   ALT (SGPT) U/L  --   --  18 20   AST (SGOT) U/L  --   --  32 37*   GLUCOSE mg/dL 116* 113* 100 90       Results from last 7 days  Lab Units 09/17/18  0654 09/16/18  0555 09/15/18  0810 09/13/18  1243   WBC 10*3/mm3  --  4.93 5.42 5.49   HEMOGLOBIN g/dL 8.8* 8.8* 8.7* 8.4*   HEMATOCRIT % 30.1* 30.9* 29.7* 28.8*   PLATELETS 10*3/mm3  --  174 181 211     Lab Results   Component Value Date    TROPONINI 0.032 09/27/2017    TROPONINI 0.032 09/27/2017    TROPONINI 0.022 09/26/2017     Lab Results   Component Value Date    CHOL 100 09/14/2018    CHOL 144 06/12/2017    CHOL 150 05/22/2017     Lab Results   Component Value Date    TRIG 67 09/14/2018    TRIG 87 06/12/2017    TRIG 77 05/22/2017     Lab Results   Component Value Date    HDL 42 09/14/2018    HDL 60 06/12/2017    HDL 66 (H) 05/22/2017     No components found for: LDLCALC  Lab Results   Component Value Date    INR 1.15 (H) 09/15/2018    INR 1.2 09/14/2018    INR 3.70 09/06/2018    PROTIME 12.1 (H) 09/15/2018    PROTIME 14.0 09/14/2018    PROTIME 36.9 (H) 05/24/2018         Ejection Fraction:    Assessment   Assessment:    1. Shortness of breath  · RHC 9/14/18 revealing mild to moderate elevation of pulm pressures,  mean PA pressure of 30 mm Hg, CO 3.2, CI 2.1  · S/P right Thoracentesis- 1400 cc 09/14/18  · S/P right chest tube insertion-09/14/18  · Pulmonary Consult today  2.  Atrial Fibrillation  · CHADS-VASc= 4- chronic coumadin therapy, currently on hold  · S/P Medtronic PPM- S/P AVN ablation 12/14/17  3.  Valvular Heart Disease  · S/P Abigail-clip 9/12/17  4.   Cellulitis bilateral LE- per hospital medicine, and proving  5.   Hypertension  6.   Hyperlipidemia      Plan:    Rocephin per hospital medicine.  To be changed to oral antibiotics when they recommend.  Change Bumex to 3 mg daily tomorrow  Increase potassium to 20 mEq daily.  Home soon    Natasha Hanna MD  09/17/18  8:40 AM

## 2018-09-17 NOTE — PROGRESS NOTES
[facility] Medicine Services  PROGRESS NOTE    Patient Name: Marilee Judge  : 6/3/1930  MRN: [id number]    Date of Admission: 2018  Length of Stay: 2  Primary Care Physician: Lubna Robertson MD    Subjective   Subjective     CC:  Cellulitis, anemia, hypothyroidism    HPI:  Actually breathing a little better today despite chest tube coming out accidentally yesterday. Failed modified swallow today but no current pain. Dyspnea mild.    Review of Systems  No headache    Otherwise ROS is negative except as mentioned in the HPI.    Objective   Objective     Vital Signs:   Temp:  [97.6 °F (36.4 °C)-98.4 °F (36.9 °C)] 98.4 °F (36.9 °C)  Heart Rate:  [71-94] 71  Resp:  [16-22] 16  BP: ()/(45-53) 99/45        Physical Exam:  Cachectic, alert, nontoxic, sitting in chair appears comfortable, nasal canula in place  Temporal wasting, oroph clear  Appropriate affect  rrr  Decreased air movement bilateral lower 1/3 lungs. No distress  Minimal b/l erythema (improved); chronic venous stasis noted  Equal , and face symmetric; speech clear       Results Reviewed:  I have personally reviewed current lab, radiology, and data and agree.      Results from last 7 days  Lab Units 18  0654 18  0555 09/15/18  0810 18  1101 18  1243   WBC 10*3/mm3  --  4.93 5.42  --  5.49   HEMOGLOBIN g/dL 8.8* 8.8* 8.7*  --  8.4*   HEMATOCRIT % 30.1* 30.9* 29.7*  --  28.8*   PLATELETS 10*3/mm3  --  174 181  --  211   INR   --   --  1.15* 1.2  --        Results from last 7 days  Lab Units 18  0654 18  0555 09/15/18  0810 18  1243   SODIUM mmol/L 139 141 143 139   POTASSIUM mmol/L 3.8 4.1 4.1 3.9   CHLORIDE mmol/L 95* 97* 101 95*   CO2 mmol/L 36.0* 40.0* 34.0* 39.0*   BUN mg/dL 25* 23 20 21   CREATININE mg/dL 0.81 0.95 0.73 0.82   GLUCOSE mg/dL 116* 113* 100 90   CALCIUM mg/dL 8.3* 8.6* 8.7 9.0   ALT (SGPT) U/L  --   --  18 20   AST (SGOT) U/L  --   --  32 37*      Estimated Creatinine Clearance: 36.4 mL/min (by C-G formula based on SCr of 0.81 mg/dL).  No results found for: BNP    Microbiology Results Abnormal     None          Imaging Results (last 24 hours)     Procedure Component Value Units Date/Time    XR Chest 1 View [[id number]] Collected:  09/17/18 0856     Updated:  09/17/18 1039    Narrative:       EXAMINATION: XR CHEST 1 VW- 09/17/2018     INDICATION: Respiratory Distress; R06.02-Shortness of breath;  R93.1-Abnormal findings on diagnostic imaging of heart and coronary  circulation      COMPARISON: 09/16/2018     FINDINGS: Portable chest reveals small bilateral pleural effusions. Tiny  apical pneumothorax identified on the right. The right pleural effusion  is slightly increasing in size. The left pleural effusion is stable.  Heart is enlarged. Increased markings seen diffusely throughout the lung  fields suggesting chronic change. Cardiac pacer leads in satisfactory  position. Calcifications seen within the pleura of the left lung apex  which is stable.           Impression:       Slight increase seen in size of the right pleural effusion.  Chronic interstitial changes seen diffusely throughout the lung fields.  No change in size of left pleural effusion. Expansion pneumothorax  stable on the right.     D:  09/17/2018  E:  09/17/2018     This report was finalized on 9/17/2018 10:37 AM by Dr. Chloe Hathaway MD.       FL Video Swallow With Speech [[id number]] Updated:  09/17/18 0953    XR Chest 1 View [[id number]] Collected:  09/16/18 1628     Updated:  09/16/18 1712    Narrative:          EXAMINATION: XR CHEST 1 VW - 9/16/2018     INDICATION: R06.02-Shortness of breath; R93.1-Abnormal findings on  diagnostic imaging of heart and coronary circulation.      COMPARISON: 9/16/2018.     FINDINGS: Portable chest reveals small right reexpansion pneumothorax is  again present. The right pleural effusion is stable. Small left pleural  effusion. Increased markings at  the right lung base. Degenerative change  is seen within the spine. The heart is enlarged.           Impression:       Stable reexpansion pneumothorax on the right with small  right pleural effusion. Left pleural effusion with enlargement of the  heart. No new focal parenchymal opacification present.     DICTATED:   9/16/2018  EDITED/ls :   9/16/2018      This report was finalized on 9/16/2018 5:10 PM by Dr. Chloe Hathaway MD.           Results for orders placed during the hospital encounter of 09/12/17   Adult Transthoracic Echo Limited    Narrative · Trivial pericardial effusion  · Normal left ventricular systolic function wall motion  · A single MitraClip is seen in position across the mitral leaflets.          I have reviewed the medications.      aspirin 81 mg Oral Daily   atorvastatin 10 mg Oral Nightly   bumetanide 2 mg Oral BID   cholecalciferol 1,000 Units Oral Daily   ferric gluconate (FERRLECIT) IVPB 62.5 mg Intravenous Daily   fluticasone 2 spray Nasal Daily   [START ON 9/18/2018] levothyroxine 125 mcg Oral Q AM   meloxicam 7.5 mg Oral Nightly   metoprolol succinate XL 12.5 mg Oral Daily   Daryl      nortriptyline 25 mg Oral Nightly   O2 3 L/min Inhalation See Admin Instructions   pancrelipase (Lip-Prot-Amyl) 24,000 units of lipase Oral TID With Meals   pantoprazole 40 mg Oral Q AM   [START ON 9/18/2018] potassium chloride 20 mEq Oral Daily   rOPINIRole 1 mg Oral Q8H   sennosides-docusate sodium 2 tablet Oral Nightly   vitamin B-12 1,000 mcg Oral Daily         Assessment/Plan   Assessment / Plan     Hospital Problem List     * (Principal)Shortness of breath    Overview Deleted 9/15/2018  6:27 PM by Pravin Donohue MD            Acute on chronic diastolic heart failure (CMS/HCC)    Bilateral pleural effusion    Other secondary pulmonary hypertension    Overview Signed 9/15/2018  6:34 PM by Pravin Donohue MD     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 Atrial fibrillation/sick sinus syndrome:  a. 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  b. Status post dual-chamber Medtronic N Rhythm pacemaker, 09/08/2005 (implanted on the right side).  c. Rythmol therapy since, 09/08/2005.  d. Chronic anticoagulation with Coumadin with a CHADS score = 2.  e. Normal LV function and left atrial dimension by echocardiogram, July 2005.  f. Negative exercise Cardiolite, January 2008; negative adenosine Cardiolite, June 2005; negative stress echocardiogram, April 2004.  g. First-degree AV block.  h. Generator change, September 2011, in [place], [place].         Hypertension    Hyperlipidemia    Hypothyroidism    Overview Signed 5/9/2017  8:54 PM by Kathrine Moss     2. Hypothyroidism, on chronic replacement therapy.           Mitral valve stenosis and regurgitation    Overview Signed 9/15/2018  6:33 PM by Pravin Donohue MD     History of Mitraclip procedure 9/2017         Iron deficiency anemia    Failure to thrive in adult    Malnutrition (CMS/HCC)    Cellulitis of both feet             Brief Hospital Course to date:  Marilee Judge is a 88 y.o. female w/ history of sick sinus syndrome/afib (w/ prior pacer implanted on right side; s/p generator change 9/2011 in [place]), afib on coumadin as outpatient (failed propafenone), valvular heart disease w/ moderate to severe MR (s/p bileaflet mitraClip placed 9/12/17), htn, hl, pad (s/p C w/ near normal coronaries, but had ptca & stent of ostial right iliac artery using biliary stent 5/22/17), hypothyroidism, migraines, gerd, prior left mastectomy (1989) for breast cancer, prior ccy and hysterectomy, prior colon resection for diverticulosis (2013) at [facility] for diverticular disease.              Patient was admitted 9/14/18 by cardiology  "service for right sided heart catheterization to further evaluate pulmonary hypertension that was diagnosed on outlying facility's echocardiogram. Had recently been admitted to [facility] at the end of august due to worsening dyspnea at that time. Apparently has been referred to a pulmonologist locally in [facility] but cannot get in to see him until January 2019 and would like to be referred to a pulmonologist here at [facility] if possible. Right heart cath was performed 9/14/18 which showed mild-moderate elevation of pulmonary pressures w/ mean PA pressure 30mmhg w/ reduced cardiac output and index. Cardiology planned admission for thoracentesis (due to noted bilateral moderate pleural effusions, and this was performed on 9/14/18 w/ 1400cc straw colored fluid removed w/ subsequent noted lack of reexpansion  Resulting in \"pneumothorax\" so 10 Kiswahili chest tube was inserted by radiology. Hospitalists were asked to see regarding possible cellulitis.      Assessment:    *Progressive dyspnea (due to chf, pulm htn)  *A/C DHF (w/ hx mitral clip for valvular heart dz)  *Bilateral Pleural effusions              -s/p 1400cc thoracentesis 9/14/18  *Post procedural (thoracentesis) pneumothorax   -chest tube \"fell out\" 9/15; now pulmonary monitoring  *Pulmonary Hypertension  *Hx afib/sss (prior pacemaker)  *BLE cellulitis  *Hx PAD (prior right iliac artery stent)  *Hypothyroidism on replacement rx (w/ low tsh)  *iron deficiency anemia  *pre-dm (a1c 6.4)  *dysphagia w/ silent aspiration all media  *hx esophageal stenosis (prior dilation)  *failure to thrive/protein malnutrtion  ------------------------------------------------------------------------------  Plan:  -bilateral foot cellulitis resolved. Stop rocephin    -regarding bilateral effusions, discussed w/ dr. gifford at bedside w/ family and all agree that foregoing invasive procedures (no pleur-x, etc) as not likely to help underlying issues (end " stage heart failure). Pursuing medial treatment w/ diuretics and treatement of heart failure as able and foregoing invasive procedures    -regarding dysphagia, MBS 9/17 showed silent aspiration all media. Family understands not candidate for anesthesia, so egd not realistic option. Comfort diet (family and patient understand aspiraton and worsening respiratory status likely to occur at some point)    -Palliative consultation to help define goals and treat any more symptoms. Depending on clinical course may (if stabilizes on diuretics only) perhaps even home with hospice.      CODE STATUS:   Code Status and Medical Interventions:   Ordered at: 09/15/18 0812     Limited Support to NOT Include:    Intubation    Artificial Nutrition     Code Status:    No CPR     Medical Interventions (Level of Support Prior to Arrest):    Limited         Electronically signed by Freddie Piña MD, 09/17/18, 1:16 PM.

## 2018-09-17 NOTE — PLAN OF CARE
Problem: Patient Care Overview  Goal: Interprofessional Rounds/Family Conf  Outcome: Ongoing (interventions implemented as appropriate)   09/17/18 1613   Interdisciplinary Rounds/Family Conf   Summary Palliative consult for GOC/ACP and other symptom management. Pt reports pain of RLS and dyspnea are improved, well managed on current regimen; Dr. Orlando ordered prn Valium for RLS. Hospice consult in place; Palliative following for continuing sx mgt and GOC discussion.       Problem: Palliative Care (Adult)  Intervention: Minimize Discomfort   09/17/18 1613   Promote Oxygenation/Perfusion   Pain Management Interventions pain management plan reviewed with patient/caregiver     Intervention: Promote Informed Decision Making and Goal Setting   09/17/18 1613   Coping/Psychosocial Interventions   Life Transition/Adjustment palliative care discussed;palliative care initiated     Intervention: Support/Optimize Psychosocial Response   09/17/18 1613   Psychosocial Support   Family/Support System Care involvement promoted;support provided;self-care encouraged       Goal: Identify Related Risk Factors and Signs and Symptoms  Outcome: Ongoing (interventions implemented as appropriate)   09/17/18 1613   Palliative Care (Adult)   Palliative Care: Related Risk Factors advanced age;chronic illness;condition is progressive;worsening symptoms   Palliative Care: Signs and Symptoms breathlessness;fatigue;pain     Goal: Maximized Comfort  Outcome: Ongoing (interventions implemented as appropriate)   09/17/18 1613   Palliative Care (Adult)   Maximized Comfort making progress toward outcome     Goal: Enhanced Quality of Life  Outcome: Ongoing (interventions implemented as appropriate)   09/17/18 1613   Palliative Care (Adult)   Enhanced Quality of Life making progress toward outcome

## 2018-09-17 NOTE — PROGRESS NOTES
Discharge Planning Assessment  [facility]     Patient Name: Marilee Judge  MRN: [id number]  Today's Date: 9/17/2018    Admit Date: 9/14/2018          Discharge Needs Assessment     Row Name 09/17/18 1425       Living Environment    Lives With child(jori), adult    Name(s) of Who Lives With Patient daughter     Current Living Arrangements home/apartment/condo    Primary Care Provided by child(jori)    Provides Primary Care For no one    Family Caregiver if Needed child(jori), adult    Quality of Family Relationships helpful;involved;supportive    Able to Return to Prior Arrangements yes       Resource/Environmental Concerns    Resource/Environmental Concerns none    Transportation Concerns car, none       Transition Planning    Patient/Family Anticipates Transition to home;home with family    Patient/Family Anticipated Services at Transition none    Transportation Anticipated family or friend will provide       Discharge Needs Assessment    Readmission Within the Last 30 Days no previous admission in last 30 days    Concerns to be Addressed denies needs/concerns at this time    Concerns Comments in discussions with MD's regarding aspiration - will follow     Equipment Currently Used at Home walker, rolling;oxygen;wheelchair;commode    Anticipated Changes Related to Illness none    Equipment Needed After Discharge none            Discharge Plan     Row Name 09/17/18 1429       Plan    Plan Home with home health and family     Patient/Family in Agreement with Plan yes    Plan Comments Met with patient and daughter at bedside regarding discharged planning - They are current with [facility] for nursing, PT, and speech - a resume [facility] order has been placed and faxed to [phone]. Will notify them on day of discharge. Patient states she has the necessary DME already and daughter provides any care she needs. At this time their goal is home with [facility]. They have upcoming discussions with  MDs regarding her aspiration troubles - CM will continue to follow as goals may changed - Tatyana [phone]         Destination     No service coordination in this encounter.      Durable Medical Equipment     No service coordination in this encounter.      Dialysis/Infusion     No service coordination in this encounter.      Home Medical Care     No service coordination in this encounter.      Social Care     No service coordination in this encounter.                Demographic Summary     Row Name 09/17/18 1424       General Information    Admission Type inpatient    Arrived From home    Referral Source admission list    Reason for Consult discharge planning    Preferred Language English     Used During This Interaction no       Contact Information    Permission Granted to Share Info With             Functional Status     Row Name 09/17/18 1425       Functional Status    Usual Activity Tolerance moderate    Current Activity Tolerance moderate       Functional Status, IADL    Medications independent    Meal Preparation assistive person    Housekeeping assistive person    Laundry assistive person    Shopping assistive person       Mental Status Summary    Recent Changes in Mental Status/Cognitive Functioning no changes            Psychosocial    No documentation.           Abuse/Neglect    No documentation.           Legal    No documentation.           Substance Abuse    No documentation.           Patient Forms    No documentation.         Tatyana Mason RN  Discharge Planning Assessment  [facility]     Patient Name: Marilee Judge  MRN: [id number]  Today's Date: 9/17/2018    Admit Date: 9/14/2018          Discharge Needs Assessment     Row Name 09/17/18 1425       Living Environment    Lives With child(jori), adult    Name(s) of Who Lives With Patient daughter     Current Living Arrangements home/apartment/condo    Primary Care Provided by child(jori)    Provides Primary Care For no one     Family Caregiver if Needed child(jori), adult    Quality of Family Relationships helpful;involved;supportive    Able to Return to Prior Arrangements yes       Resource/Environmental Concerns    Resource/Environmental Concerns none    Transportation Concerns car, none       Transition Planning    Patient/Family Anticipates Transition to home;home with family    Patient/Family Anticipated Services at Transition none    Transportation Anticipated family or friend will provide       Discharge Needs Assessment    Readmission Within the Last 30 Days no previous admission in last 30 days    Concerns to be Addressed denies needs/concerns at this time    Concerns Comments in discussions with MD's regarding aspiration - will follow     Equipment Currently Used at Home walker, rolling;oxygen;wheelchair;commode    Anticipated Changes Related to Illness none    Equipment Needed After Discharge none            Discharge Plan     Row Name 09/17/18 8345       Plan    Plan Home with home health and family     Patient/Family in Agreement with Plan yes    Plan Comments Met with patient and daughter at bedside regarding discharged planning - They are current with [facility] for nursing, PT, and speech - a resume [facility] order has been placed and faxed to [phone]. Will notify them on day of discharge. Patient states she has the necessary DME already and daughter provides any care she needs. At this time their goal is home with [facility]. They have upcoming discussions with MDs regarding her aspiration troubles - CM will continue to follow as goals may changed - Tatyana [phone]         Destination     No service coordination in this encounter.      Durable Medical Equipment     No service coordination in this encounter.      Dialysis/Infusion     No service coordination in this encounter.      Home Medical Care     No service coordination in this encounter.      Social Care     No service coordination in  this encounter.                Demographic Summary     Row Name 09/17/18 1424       General Information    Admission Type inpatient    Arrived From home    Referral Source admission list    Reason for Consult discharge planning    Preferred Language English     Used During This Interaction no       Contact Information    Permission Granted to Share Info With             Functional Status     Row Name 09/17/18 1425       Functional Status    Usual Activity Tolerance moderate    Current Activity Tolerance moderate       Functional Status, IADL    Medications independent    Meal Preparation assistive person    Housekeeping assistive person    Laundry assistive person    Shopping assistive person       Mental Status Summary    Recent Changes in Mental Status/Cognitive Functioning no changes            Psychosocial    No documentation.           Abuse/Neglect    No documentation.           Legal    No documentation.           Substance Abuse    No documentation.           Patient Forms    No documentation.         Tatyana Mason RN

## 2018-09-17 NOTE — THERAPY DISCHARGE NOTE
Acute Care - Speech Language Pathology   Swallow Progress Note/Discharge   [facility]     Patient Name: Marilee Judge  : 6/3/1930  MRN: [id number]  Today's Date: 2018               Admit Date: 2018    Visit Dx:      ICD-10-CM ICD-9-CM   1. Pharyngeal dysphagia R13.13 787.23   2. Shortness of breath R06.02 786.05   3. Abnormal echocardiogram R93.1 793.2   4. Acute on chronic diastolic heart failure (CMS/HCC) I50.33 428.33     Patient Active Problem List   Diagnosis   • Atrial fibrillation (CMS/HCC)   • Hypertension   • Hyperlipidemia   • Hypothyroidism   • History of breast cancer   • Osteoporosis   • Migraine   • GERD (gastroesophageal reflux disease)   • RLS (restless legs syndrome)   • Dyspnea on exertion   • Non-rheumatic mitral regurgitation, sp mitral clip procedure 17   • Acute pericardial effusion   • Peripheral vascular disease (CMS/HCC)   • Acute on chronic diastolic heart failure (CMS/HCC)   • Bilateral pleural effusion   • Persistent atrial fibrillation (CMS/HCC)   • Shortness of breath   • Mitral valve stenosis and regurgitation   • Other secondary pulmonary hypertension   • Iron deficiency anemia   • Postprocedural pneumothorax   • Dysphagia   • Failure to thrive in adult   • Malnutrition (CMS/HCC)   • Cellulitis of both feet   • Silent aspiration       Therapy Treatment    Therapy Treatment / Health Promotion    Treatment Time/Intention  Document Type: evaluation (18 : Anila Hoyos MS CCC-SLP)  Subjective Information: no complaints (18 : Anila Hoyos, MS CCC-SLP)  Patient/Family Observations: no family present (18 : Anila Hoyos, MS CCC-SLP)  Therapy Frequency (Swallow): 5 days per week (18 : Anila Hoyos, MS CCC-SLP)  Patient Effort: good (18 : Anila Hoyos, MS CCC-SLP)  Plan of Care Review  Plan of Care Reviewed With: patient, daughter (18 1624 : Anila Hoyos MS CCC-SLP)    Vitals/Pain/Safety        Cognition, Communication, Swallow  Clinical Impression  SLP Swallowing Diagnosis: functional oral phase, mod-severe, pharyngeal dysfunction (09/17/18 0900 : Anila Hoyos MS CCC-SLP)  Functional Impact: risk of aspiration/pneumonia, risk of malnutrition (09/17/18 0900 : Anila Hoyos MS CCC-SLP)  Rehab Potential/Prognosis, Swallowing: adequate, monitor progress closely (09/17/18 0900 : Anila Hoyos MS CCC-SLP)  Swallow Criteria for Skilled Therapeutic Interventions Met: demonstrates skilled criteria (09/17/18 0900 : Anila Hoyos, MS CCC-SLP)  Recommendations  Therapy Frequency (Swallow): 5 days per week (09/17/18 0900 : Anila Hoyos MS CCC-SLP)  Predicted Duration Therapy Intervention (Days): until discharge (09/17/18 0900 : Anila Hoyos MS CCC-SLP)  SLP Diet Recommendation: NPO (if comfort diet is desired recommend pureed & nectar liquids) (09/17/18 0900 : Anila Hoyos MS CCC-SLP)  Anticipated Dischage Disposition: home with assist (Pt has Hospice consult. ) (09/17/18 1620 : Anila Hoyos MS CCC-SLP)    Outcome Summary  Outcome Summary/Treatment Plan (SLP)  Anticipated Dischage Disposition: home with assist (Pt has Hospice consult. ) (09/17/18 1620 : Anila Hoyos MS CCC-SLP)  Reason for Discharge: other (see comments) (Pt and daughter have chosen to eat a comfort diet despite aspiration risk. MD discussed risks of continuing comfort diet. Family aware of risks. Educated pt and daughter on swallowing precautions to reduce aspiration risk. ) (09/17/18 1620 : Anila Hoyos [facility]-SLP)        SLP GOALS     Row Name 09/17/18 0900             Oral Nutrition/Hydration Goal 1 (SLP)    Oral Nutrition/Hydration Goal 1, SLP LTG: Pt with improve swallow function to be able to tolerate a modified dysphagia diet without aspiration by discharge.  -LR      Time Frame (Oral Nutrition/Hydration Goal 1, SLP) --  -LR         Pharyngeal Strengthening Exercise Goal 1 (SLP)    Activity (Pharyngeal  Strengthening Goal 1, SLP) increase superior movement of the hyolaryngeal complex;increase anterior movement of the hyolaryngeal complex;increase tongue base retraction;increase epiglottic inversion and retroflexion  -LR      Increase Superior Movement of the Hyolaryngeal Complex falsetto;hard effortful swallow;effortful pitch glide (falsetto + pharyngeal squeeze);chin tuck against resistance (CTAR);shaker;jose elias  -LR      Melvin/Accuracy (Pharyngeal Strengthening Goal 1, SLP) with minimal cues (75-90% accuracy)  -LR      Time Frame (Pharyngeal Strengthening Goal 1, SLP) by discharge  -LR        User Key  (r) = Recorded By, (t) = Taken By, (c) = Cosigned By    Initials Name Provider Type    Anila Jackson MS CCC-SLP Speech and Language Pathologist          EDUCATION  The patient has been educated in the following areas:   Dysphagia (Swallowing Impairment).    SLP Recommendation and Plan  SLP Swallowing Diagnosis: functional oral phase, mod-severe, pharyngeal dysfunction  SLP Diet Recommendation: NPO (if comfort diet is desired recommend pureed & nectar liquids)           Swallow Criteria for Skilled Therapeutic Interventions Met: demonstrates skilled criteria  Anticipated Dischage Disposition: home with assist (Pt has Hospice consult. )  Rehab Potential/Prognosis, Swallowing: adequate, monitor progress closely  Therapy Frequency (Swallow): 5 days per week  Predicted Duration Therapy Intervention (Days): until discharge       Plan of Care Reviewed With: patient, daughter  Plan of Care Reviewed With: patient, daughter           Time Calculation:         Time Calculation- SLP     Row Name 09/17/18 1627 09/17/18 1027          Time Calculation- SLP    SLP Start Time 1600  -LR 0900  -LR     SLP Received On 09/17/18  -LR 09/17/18  -LR       User Key  (r) = Recorded By, (t) = Taken By, (c) = Cosigned By    Initials Name Provider Type    Anila Jacskon MS CCC-SLP Speech and Language Pathologist          Therapy  Charges for Today     Code Description Service Date Service Provider Modifiers Qty    62658672533 HC ST SWALLOWING CURRENT STATUS 9/17/2018 RomainAnila, MS CCC-SLP GN, CN 1    75321087313 HC ST SWALLOWING PROJECTED 9/17/2018 MatheusryanAnila elias, MS CCC-SLP GN, CM 1    00275952534 HC ST MOTION FLUORO EVAL SWALLOW 6 9/17/2018 Anila Hoyos, MS CCC-SLP GN 1    49680779606 HC ST TREATMENT SWALLOW 2 9/17/2018 Anila Hoyos MS CCC-SLP GN 1          SLP G-Codes  Functional Limitations: Swallowing  Swallow Current Status (): 100 percent impaired, limited or restricted  Swallow Goal Status (): At least 80 percent but less than 100 percent impaired, limited or restricted    SLP Discharge Summary  Anticipated Dischage Disposition: home with assist (Pt has Hospice consult. )  Reason for Discharge: other (see comments) (Pt and daughter have chosen to eat a comfort diet despite aspiration risk. MD discussed risks of continuing comfort diet. Family aware of risks. Educated pt and daughter on swallowing precautions to reduce aspiration risk. )  Progress Toward Achieving Short/long Term Goals: other (see comments) (Pt has chosen palliative comfort diet and pending Hospice. Goals were not addressed. )  Discharge Destination: home w/ assist    Anila Hoyos MS CCC-SLP  9/17/2018

## 2018-09-18 NOTE — PLAN OF CARE
Problem: Palliative Care (Adult)  Intervention: Support/Optimize Psychosocial Response   09/18/18 1100   Coping/Psychosocial Interventions   Supportive Measures active listening utilized;positive reinforcement provided   Psychosocial Support   Family/Support System Care support provided   Patient up in chair, eager to go home, denies pain at time of visit.  Family at bedside supportive of pt needs and care. Plan per patient and family is home with hospice today.

## 2018-09-18 NOTE — PROGRESS NOTES
[facility] Medicine Services  PROGRESS NOTE    Patient Name: Marilee Judge  : 6/3/1930  MRN: [id number]    Date of Admission: 2018  Length of Stay: 3  Primary Care Physician: Lubna Robertson MD    Subjective   Subjective     CC: f/u for cellulitis, pleural effusion    HPI:No acute events overnight patient states that she is doing ok, slept well, she is comfortable.    Review of Systems  Gen- No fevers, chills  CV- No chest pain, palpitations  Resp- No cough, dyspnea  GI- No N/V/D, abd pain    Otherwise ROS is negative except as mentioned in the HPI.    Objective   Objective     Vital Signs:   Temp:  [96.4 °F (35.8 °C)-98 °F (36.7 °C)] 96.4 °F (35.8 °C)  Heart Rate:  [73-81] 73  Resp:  [16] 16  BP: (105-126)/(46-59) 105/46        Physical Exam:  Constitutional: chronically ill elderly lady, uncomfortable, in no acute distress, awake, alert  HENT: NCAT, mucous membranes moist  Respiratory: Clear to auscultation bilaterally, respiratory effort normal   Cardiovascular: RRR, no murmurs, rubs, or gallops, palpable pedal pulses bilaterally  Gastrointestinal: Positive bowel sounds, soft, nontender, nondistended  Musculoskeletal: No bilateral ankle edema  Psychiatric: KURT  Neurologic: KURT  Skin: No rashes    Results Reviewed:  I have personally reviewed current lab, radiology, and data and agree.      Results from last 7 days  Lab Units 18  0654 18  0555 09/15/18  0810 18  1101 18  1243   WBC 10*3/mm3  --  4.93 5.42  --  5.49   HEMOGLOBIN g/dL 8.8* 8.8* 8.7*  --  8.4*   HEMATOCRIT % 30.1* 30.9* 29.7*  --  28.8*   PLATELETS 10*3/mm3  --  174 181  --  211   INR   --   --  1.15* 1.2  --        Results from last 7 days  Lab Units 18  0515 18  0654 18  0555 09/15/18  0810 18  1243   SODIUM mmol/L 144 139 141 143 139   POTASSIUM mmol/L 3.6 3.8 4.1 4.1 3.9   CHLORIDE mmol/L 95* 95* 97* 101 95*   CO2 mmol/L 36.0* 36.0* 40.0* 34.0* 39.0*   BUN  mg/dL 22 25* 23 20 21   CREATININE mg/dL 0.84 0.81 0.95 0.73 0.82   GLUCOSE mg/dL 90 116* 113* 100 90   CALCIUM mg/dL 8.9 8.3* 8.6* 8.7 9.0   ALT (SGPT) U/L  --   --   --  18 20   AST (SGOT) U/L  --   --   --  32 37*     Estimated Creatinine Clearance: 35.1 mL/min (by C-G formula based on SCr of 0.84 mg/dL).  No results found for: BNP    Microbiology Results Abnormal     None          Imaging Results (last 24 hours)     Procedure Component Value Units Date/Time    FL Video Swallow With Speech [[id number]] Collected:  09/17/18 1549     Updated:  09/17/18 1650    Narrative:       EXAMINATION: FL VIDEO SWALLOW W SPEECH-     INDICATION: dysphagia; R06.02-Shortness of breath; R93.1-Abnormal  findings on diagnostic imaging of heart and coronary circulation     TECHNIQUE: 2 minutes and 18 seconds of fluoroscopic time was used for  this exam. 1 associated image was saved. The patient was evaluated in  the seated lateral position while taking a variety of consistencies of  barium by mouth under the direction of speech pathology.     COMPARISON: NONE     FINDINGS: There was aspiration during swallows of thin, nectar, and  honey consistency barium. There was a moderate amount of residue in the  vallecula, and piriform sinuses after swallows with all media attempted,  which also resulted in eventual aspiration.          Impression:       Fluoroscopy provided for a modified barium swallow. Please  see speech therapy report for full details and recommendations.         This report was finalized on 9/17/2018 4:48 PM by Dr. Macario Elaine.       CT Guided Chest Tube [[id number]] Collected:  09/14/18 1634     Updated:  09/17/18 1326    Narrative:       EXAMINATION: CT GUIDED CHEST TUBE PLACEMENT- 09/14/2018     INDICATION: R06.02-Shortness of breath; R93.1-Abnormal findings on  diagnostic imaging of heart and coronary circulation         TECHNIQUE: Using CT guidance, local anesthesia and sterile technique, an  8.5 Thai catheter was  "inserted into a right pleural effusion.     The radiation dose reduction device was turned on for each scan per the  ALARA (As Low as Reasonably Achievable) protocol.     COMPARISON: NONE     FINDINGS: Approximately 1400 cc of straw-colored fluid was removed.  Subsequent CT images demonstrated marked improvement in the pleural  effusion but without reexpansion of the left lung resulting in a  \"pneumothorax.\" Therefore the catheter was exchanged for a 10 Indonesian  catheter which was inserted as a chest tube, sutured in position and the  patient returned to her room in satisfactory condition. Orders were  given for management of the chest tube.       Impression:       1400 cc of fluid was removed from a right thoracentesis.  Subsequently images demonstrated the lack of reexpansion of the right  lung and therefore a chest tube was inserted and a slightly larger 10  Indonesian catheter was inserted as a chest tube. Once in her room orders  have been given to chest tube management.     D:  09/14/2018  E:  09/14/2018     This report was finalized on 9/17/2018 1:24 PM by Dr. Enio Gill MD.       XR Chest 1 View [[id number]] Collected:  09/17/18 0856     Updated:  09/17/18 1039    Narrative:       EXAMINATION: XR CHEST 1 VW- 09/17/2018     INDICATION: Respiratory Distress; R06.02-Shortness of breath;  R93.1-Abnormal findings on diagnostic imaging of heart and coronary  circulation      COMPARISON: 09/16/2018     FINDINGS: Portable chest reveals small bilateral pleural effusions. Tiny  apical pneumothorax identified on the right. The right pleural effusion  is slightly increasing in size. The left pleural effusion is stable.  Heart is enlarged. Increased markings seen diffusely throughout the lung  fields suggesting chronic change. Cardiac pacer leads in satisfactory  position. Calcifications seen within the pleura of the left lung apex  which is stable.           Impression:       Slight increase seen in size of the right " pleural effusion.  Chronic interstitial changes seen diffusely throughout the lung fields.  No change in size of left pleural effusion. Expansion pneumothorax  stable on the right.     D:  09/17/2018  E:  09/17/2018     This report was finalized on 9/17/2018 10:37 AM by Dr. Chloe Hathaway MD.           Results for orders placed during the hospital encounter of 09/12/17   Adult Transthoracic Echo Limited    Narrative · Trivial pericardial effusion  · Normal left ventricular systolic function wall motion  · A single MitraClip is seen in position across the mitral leaflets.          I have reviewed the medications.      aspirin 81 mg Oral Daily   atorvastatin 10 mg Oral Nightly   bumetanide 2 mg Oral BID   cholecalciferol 1,000 Units Oral Daily   fluticasone 2 spray Nasal Daily   levothyroxine 125 mcg Oral Q AM   meloxicam 7.5 mg Oral Nightly   metoprolol succinate XL 12.5 mg Oral Daily   Daryl      nortriptyline 25 mg Oral Nightly   O2 3 L/min Inhalation See Admin Instructions   pancrelipase (Lip-Prot-Amyl) 24,000 units of lipase Oral TID With Meals   pantoprazole 40 mg Oral Q AM   potassium chloride 20 mEq Oral Daily   rOPINIRole 1 mg Oral Q8H   sennosides-docusate sodium 2 tablet Oral Nightly   vitamin B-12 1,000 mcg Oral Daily         Assessment/Plan   Assessment / Plan     Hospital Problem List     * (Principal)Acute on chronic diastolic heart failure (CMS/HCC)    Permanent atrial fibrillation (CMS/HCC)    Overview Signed 5/9/2017  8:53 PM by Kathrine Moss     1. Atrial fibrillation/sick sinus syndrome:  a. 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  b. Status post dual-chamber Medtronic N Rhythm pacemaker, 09/08/2005 (implanted on the right side).  c. Rythmol therapy since, 09/08/2005.  d. Chronic anticoagulation with Coumadin with a CHADS score = 2.  e. Normal LV function and left atrial dimension by echocardiogram, July 2005.  f. Negative exercise Cardiolite, January 2008;  negative adenosine Cardiolite, June 2005; negative stress echocardiogram, April 2004.  g. First-degree AV block.  h. Generator change, September 2011, in [place], [place].         Hypertension    Hyperlipidemia    Hypothyroidism    Overview Signed 5/9/2017  8:54 PM by Kathrine Moss     2. Hypothyroidism, on chronic replacement therapy.           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 female w/ history of sick sinus syndrome/afib (w/ prior pacer implanted on right side; s/p generator change 9/2011 in [place]), afib on coumadin as outpatient (failed propafenone), valvular heart disease w/ moderate to severe MR (s/p bileaflet mitraClip placed 9/12/17), htn, hl, pad (s/p [facility] w/ near normal coronaries, but had ptca & stent of ostial right iliac artery using biliary stent 5/22/17), hypothyroidism, migraines, gerd, prior left mastectomy (1989) for breast cancer, prior ccy and hysterectomy, prior colon resection for diverticulosis (2013) at [facility] for diverticular disease.   Patient was admitted 9/14/18 by cardiology service for right sided heart catheterization to further evaluate pulmonary hypertension that was diagnosed on outlying facility's echocardiogram. Had recently been admitted to [facility] at the end of august due to worsening dyspnea at  "that time. Apparently has been referred to a pulmonologist locally in [facility] but cannot get in to see him until January 2019 and would like to be referred to a pulmonologist here at [facility] if possible. Right heart cath was performed 9/14/18 which showed mild-moderate elevation of pulmonary pressures w/ mean PA pressure 30mmhg w/ reduced cardiac output and index. Cardiology planned admission for thoracentesis (due to noted bilateral moderate pleural effusions, and this was performed on 9/14/18 w/ 1400cc straw colored fluid removed w/ subsequent noted lack of reexpansion  Resulting in \"pneumothorax\" so 10 Armenian chest tube was inserted by radiology. Hospitalists were asked to see regarding possible cellulitis.    Assessment & Plan:  Patients condition has complicated as above, he has progressively declined, my patners and colleaguyes have discussed with family, they all agree to foregoing invasive procedures (no pleur-x, etc) as not likely to help underlying issues (end stage heart failure). Pursuing medial treatment w/ diuretics and treatement of heart failure as able and foregoing invasive procedures. Palliative care has been consulted and family would like to pursue a more comfort care approach. Currently awaiting evaluation by Hospice who have been consulted and plan is for likely dc home with hosipice.      CODE STATUS:   Code Status and Medical Interventions:   Ordered at: 09/15/18 0812     Limited Support to NOT Include:    Intubation    Artificial Nutrition     Code Status:    No CPR     Medical Interventions (Level of Support Prior to Arrest):    Limited       Disposition: anticipate d/c home with hospice      Electronically signed by Amparo Butcher MD, 09/18/18, 10:38 AM.    "

## 2018-09-18 NOTE — PLAN OF CARE
Problem: Patient Care Overview  Goal: Plan of Care Review  Outcome: Ongoing (interventions implemented as appropriate)   09/18/18 4741   Coping/Psychosocial   Plan of Care Reviewed With patient;daughter   Plan of Care Review   Progress improving   OTHER   Outcome Summary Pt. sleeping for most of the evening and night; Tylenol given once for leg pain; using 2L of oxygen; taking medication with applesauce without difficulty; VSS; continue to monitor.

## 2018-09-18 NOTE — PROGRESS NOTES
Palliative Care Progress Note    Date of Admission: 9/14/2018    Subjective:  Patient states the Valium did help significantly with her restless leg syndrome.  Patient states that she is going to go home today.  Current Code Status     Date Active Code Status Order ID Comments User Context       9/15/2018  8:12 AM No CPR [id number]  Freddie Piña MD Inpatient       Questions for Current Code Status     Question Answer Comment    Code Status No CPR     Medical Interventions (Level of Support Prior to Arrest) Limited     Limited Support to NOT Include Intubation      Artificial Nutrition         No current facility-administered medications on file prior to encounter.      Current Outpatient Prescriptions on File Prior to Encounter   Medication Sig Dispense Refill   • aspirin 81 MG tablet Take 81 mg by mouth Daily. 30 tablet 11   • Calcium Carb-Cholecalciferol (CALCIUM 600 + D PO) Take 1 tablet by mouth 2 (Two) Times a Day.     • cholecalciferol (VITAMIN D3) 1000 UNITS tablet Take 1,000 Units by mouth Daily.     • HYDROcodone-acetaminophen (NORCO) 5-325 MG per tablet Take 1 tablet by mouth Every 4 (Four) Hours As Needed for Moderate Pain .     • levothyroxine (LEVOXYL) 150 MCG tablet Take 150 mcg by mouth Every Morning.     • meloxicam (MOBIC) 7.5 MG tablet Take 7.5 mg by mouth Every Night.     • metoprolol succinate XL (TOPROL-XL) 25 MG 24 hr tablet Take 0.5 tablets by mouth Every Morning. 30 tablet 11   • Multiple Vitamins-Minerals (CENTRUM ADULTS PO) Take 1 tablet by mouth Daily.     • nortriptyline (PAMELOR) 25 MG capsule Take 25 mg by mouth Every Night.     • O2 (OXYGEN) Inhale 3 L/min See Admin Instructions. Patient wears 3L at all times.     • omeprazole (priLOSEC) 40 MG capsule Take 40 mg by mouth Every Morning.     • pancrelipase, Lip-Prot-Amyl, (CREON) 11187 UNITS capsule delayed-release particles capsule Take 24,000 units of lipase by mouth 3 (Three) Times a Day With Meals.     • pravastatin  "(PRAVACHOL) 40 MG tablet Take 40 mg by mouth Every Night.     • rOPINIRole (REQUIP) 1 MG tablet Take 1 mg by mouth 3 (Three) Times a Day. Taking 1mg up to 2-3 times per day (max 4mg per day)     • triamcinolone (KENALOG) 0.1 % cream Apply 1 application topically 1 (One) Time Per Week.     • vitamin B-12 (CYANOCOBALAMIN) 1000 MCG tablet Take 1,000 mcg by mouth Daily.     • warfarin (COUMADIN) 1 MG tablet Take  by mouth Take As Directed. 2 mg mon wed fri with 1 mg sun tue thurs and sat     • warfarin (COUMADIN) 2 MG tablet Take 1/2-1 tablet by mouth daily as directed by anticoagulation clinic pharmacist 90 tablet 3   • bumetanide (BUMEX) 1 MG tablet TAKE 3 TABLETS DAILY 270 tablet 1       hold 1 each     •  acetaminophen  •  ALPRAZolam  •  diazePAM  •  hold  •  HYDROcodone-acetaminophen  •  magnesium hydroxide  •  Morphine **AND** naloxone    Objective: /46   Pulse 73   Temp 96.4 °F (35.8 °C) (Axillary)   Resp 16   Ht 167.6 cm (66\")   Wt 48 kg (105 lb 13.1 oz)   LMP  (LMP Unknown)   SpO2 100%   BMI 17.08 kg/m²      Intake/Output Summary (Last 24 hours) at 09/18/18 1340  Last data filed at 09/18/18 0800   Gross per 24 hour   Intake              200 ml   Output              700 ml   Net             -500 ml     Physical Exam:      General Appearance:    Alert, cooperative, in no acute distress   Head:    Normocephalic, without obvious abnormality, atraumatic   Eyes:            Lids and lashes normal, conjunctivae and sclerae normal, no   icterus, no pallor, corneas clear, PERRLA   Ears:    Ears appear intact with no abnormalities noted   Throat:   No oral lesions, no thrush, oral mucosa moist   Neck:   No adenopathy, supple, trachea midline, no thyromegaly, no     carotid bruit, no JVD   Back:     No kyphosis present, no scoliosis present, no skin lesions,       erythema or scars, no tenderness to percussion or                   palpation,   range of motion normal   Lungs:     Clear to " auscultation,respirations regular, even and                   unlabored    Heart:    Regular rhythm and normal rate, normal S1 and S2, no            murmur, no gallop, no rub, no click   Breast Exam:    Deferred   Abdomen:     Normal bowel sounds, no masses, no organomegaly, soft        non-tender, non-distended, no guarding, no rebound                 tenderness   Genitalia:    Deferred   Extremities:   Moves all extremities well, no edema, no cyanosis, no              redness   Pulses:   Pulses palpable and equal bilaterally   Skin:   No bleeding, bruising or rash   Lymph nodes:   No palpable adenopathy   Neurologic:   Cranial nerves 2 - 12 grossly intact, sensation intact, DTR        present and equal bilaterally       Results from last 7 days  Lab Units 09/17/18  0654 09/16/18  0555   WBC 10*3/mm3  --  4.93   HEMOGLOBIN g/dL 8.8* 8.8*   HEMATOCRIT % 30.1* 30.9*   PLATELETS 10*3/mm3  --  174       Results from last 7 days  Lab Units 09/18/18  0515  09/15/18  0810   SODIUM mmol/L 144  < > 143   POTASSIUM mmol/L 3.6  < > 4.1   CHLORIDE mmol/L 95*  < > 101   CO2 mmol/L 36.0*  < > 34.0*   BUN mg/dL 22  < > 20   CREATININE mg/dL 0.84  < > 0.73   CALCIUM mg/dL 8.9  < > 8.7   BILIRUBIN mg/dL  --   --  0.7   ALK PHOS U/L  --   --  126*   ALT (SGPT) U/L  --   --  18   AST (SGOT) U/L  --   --  32   GLUCOSE mg/dL 90  < > 100   < > = values in this interval not displayed.    Impression: CHF  Dyspnea  Mitral valve stenosis  RLS  Pulmonary HTN  GOC  Plan: Family is waiting to talk to hospice and most likely will be looking at going home today.  Patient okay to be discharged from palliative medicine standpoint.        Jonel Orlando,   09/18/18  1:40 PM

## 2018-09-18 NOTE — PROGRESS NOTES
"[facility] Discharge Note       LOS: 3 days   Patient Care Team:  Lubna Robertson MD as PCP - General (Internal Medicine)  Eugene Langston MD as Consulting Physician (Internal Medicine)    Chief Complaint:  dyspnea/afib       Subjective     Subjective: feels well, has ambulated some, but does not feel steady on the walker provided here.  She would like to go home.  Palliative has been involved; she does not want any invasive procedures performed.      Review of Systems:   As above.    Medications:    aspirin 81 mg Oral Daily   atorvastatin 10 mg Oral Nightly   bumetanide 2 mg Oral BID   cholecalciferol 1,000 Units Oral Daily   fluticasone 2 spray Nasal Daily   levothyroxine 125 mcg Oral Q AM   meloxicam 7.5 mg Oral Nightly   metoprolol succinate XL 12.5 mg Oral Daily   Daryl      nortriptyline 25 mg Oral Nightly   O2 3 L/min Inhalation See Admin Instructions   pancrelipase (Lip-Prot-Amyl) 24,000 units of lipase Oral TID With Meals   pantoprazole 40 mg Oral Q AM   potassium chloride 20 mEq Oral Daily   rOPINIRole 1 mg Oral Q8H   sennosides-docusate sodium 2 tablet Oral Nightly   vitamin B-12 1,000 mcg Oral Daily       Objective     Vital Sign Min/Max for last 24 hours  Temp  Min: 96.4 °F (35.8 °C)  Max: 98.4 °F (36.9 °C)   BP  Min: 99/45  Max: 126/53   Pulse  Min: 71  Max: 81   Resp  Min: 16  Max: 16   SpO2  Min: 98 %  Max: 100 %   Flow (L/min)  Min: 2  Max: 3   No Data Recorded      Intake/Output Summary (Last 24 hours) at 09/18/18 0847  Last data filed at 09/18/18 0100   Gross per 24 hour   Intake                0 ml   Output             1000 ml   Net            -1000 ml        Flowsheet Rows      First Filed Value   Admission Height  167.6 cm (66\") Documented at 09/14/2018 0916   Admission Weight  48 kg (105 lb 13.1 oz) Documented at 09/14/2018 0916          Physical Exam:    General: Alert and oriented.   Cardiovascular: Heart has a nondisplaced focal PMI. Regular rate and rhythm without murmur, " gallop or rub.  Lungs: Clear without rales or wheezes. Equal expansion is noted.   Abdomen: Soft, nontender.  Extremities: Show no edema.   Skin: warm and dry.       Results Review:    I reviewed the patient's new clinical results.    Tele:  V-Paced at 70      Labs:      Results from last 7 days  Lab Units 09/18/18  0515 09/17/18  0654 09/16/18  0555 09/15/18  0810 09/13/18  1243   SODIUM mmol/L 144 139 141 143 139   POTASSIUM mmol/L 3.6 3.8 4.1 4.1 3.9   CHLORIDE mmol/L 95* 95* 97* 101 95*   CO2 mmol/L 36.0* 36.0* 40.0* 34.0* 39.0*   BUN mg/dL 22 25* 23 20 21   CREATININE mg/dL 0.84 0.81 0.95 0.73 0.82   CALCIUM mg/dL 8.9 8.3* 8.6* 8.7 9.0   BILIRUBIN mg/dL  --   --   --  0.7 0.6   ALK PHOS U/L  --   --   --  126* 131*   ALT (SGPT) U/L  --   --   --  18 20   AST (SGOT) U/L  --   --   --  32 37*   GLUCOSE mg/dL 90 116* 113* 100 90       Results from last 7 days  Lab Units 09/17/18  0654 09/16/18  0555 09/15/18  0810 09/13/18  1243   WBC 10*3/mm3  --  4.93 5.42 5.49   HEMOGLOBIN g/dL 8.8* 8.8* 8.7* 8.4*   HEMATOCRIT % 30.1* 30.9* 29.7* 28.8*   PLATELETS 10*3/mm3  --  174 181 211     Lab Results   Component Value Date    TROPONINI 0.032 09/27/2017    TROPONINI 0.032 09/27/2017    TROPONINI 0.022 09/26/2017     Lab Results   Component Value Date    CHOL 100 09/14/2018    CHOL 144 06/12/2017    CHOL 150 05/22/2017     Lab Results   Component Value Date    TRIG 67 09/14/2018    TRIG 87 06/12/2017    TRIG 77 05/22/2017     Lab Results   Component Value Date    HDL 42 09/14/2018    HDL 60 06/12/2017    HDL 66 (H) 05/22/2017     No components found for: LDLCALC  Lab Results   Component Value Date    INR 1.15 (H) 09/15/2018    INR 1.2 09/14/2018    INR 3.70 09/06/2018    PROTIME 12.1 (H) 09/15/2018    PROTIME 14.0 09/14/2018    PROTIME 36.9 (H) 05/24/2018       Ejection Fraction:      Assessment   Assessment:  1. Shortness of breath  · RHC 9/14/18 revealing mild to moderate elevation of pulm pressures, mean PA pressure of 30  mm Hg, CO 3.2, CI 2.1  · S/P right Thoracentesis- 1400 cc 09/14/18  · S/P right chest tube insertion-09/14/18  · Pulmonary Consult today  2.  Atrial Fibrillation  · CHADS-VASc= 4- chronic coumadin therapy, currently on hold  · S/P Medtronic PPM- S/P AVN ablation 12/14/17  3.  Valvular Heart Disease  · S/P Abigail-clip 9/12/17  4.   Cellulitis bilateral - [facility] medicine, and proving  5.   Hypertension  6.   Hyperlipidemia      Plan:  Home with hospice on Bumex 3 mg daily and potassium 20 mEq daily.  No other medication changes will be made.  Follow-up with me in 6 weeks  The patient is stable, appropriate for discharge home, and follow-up has been arranged.      Keiry Joe, LUCI  09/18/18  8:47 AM    I, Natasha Hanna MD, have reviewed the note in full and agree with all aspects of the above including physical exam, assessment, labs and plan with changes made accordingly.    Natasha Hanna MD, FACC

## 2018-09-18 NOTE — PLAN OF CARE
Problem: Patient Care Overview  Goal: Plan of Care Review  Outcome: Outcome(s) achieved Date Met: 09/18/18 09/18/18 9279   Coping/Psychosocial   Plan of Care Reviewed With patient;daughter   OTHER   Outcome Summary pt. being discharge today     Goal: Individualization and Mutuality  Outcome: Outcome(s) achieved Date Met: 09/18/18    Goal: Discharge Needs Assessment  Outcome: Outcome(s) achieved Date Met: 09/18/18    Goal: Interprofessional Rounds/Family Conf  Outcome: Outcome(s) achieved Date Met: 09/18/18      Problem: Skin Injury Risk (Adult)  Goal: Identify Related Risk Factors and Signs and Symptoms  Outcome: Outcome(s) achieved Date Met: 09/18/18    Goal: Skin Health and Integrity  Outcome: Outcome(s) achieved Date Met: 09/18/18      Problem: Fall Risk (Adult)  Goal: Identify Related Risk Factors and Signs and Symptoms  Outcome: Outcome(s) achieved Date Met: 09/18/18    Goal: Absence of Fall  Outcome: Outcome(s) achieved Date Met: 09/18/18      Problem: Chest Tube Drainage Device (Adult)  Goal: Signs and Symptoms of Listed Potential Problems Will be Absent, Minimized or Managed (Chest Tube Drainage Device)  Outcome: Outcome(s) achieved Date Met: 09/18/18      Problem: Palliative Care (Adult)  Goal: Identify Related Risk Factors and Signs and Symptoms  Outcome: Outcome(s) achieved Date Met: 09/18/18    Goal: Maximized Comfort  Outcome: Outcome(s) achieved Date Met: 09/18/18    Goal: Enhanced Quality of Life  Outcome: Outcome(s) achieved Date Met: 09/18/18

## 2018-09-18 NOTE — PROGRESS NOTES
Continued Stay Note  [facility]     Patient Name: Marilee Judge  MRN: [id number]  Today's Date: 9/18/2018    Admit Date: 9/14/2018          Discharge Plan     Row Name 09/18/18 1749       Plan    Plan [facility] home care services    Patient/Family in Agreement with Plan yes    Final Discharge Disposition Code 50 - home with hospice    Final Note D/C'd per private car with dtrNazia, to [facility] services.  Discharge Summary and After Visit Summary medication review faxed to hospice.  Dtr. given contact number for hospice on arrival home.              Discharge Codes    No documentation.       Expected Discharge Date and Time     Expected Discharge Date Expected Discharge Time    Sep 18, 2018             Susie Bourgeois RN

## 2018-09-19 NOTE — OUTREACH NOTE
Prep Survey      Responses   Facility patient discharged from?  [facility]   Is patient eligible?  No   What are the reasons patient is not eligible?  Hospice/Pallative Care   Does the patient have one of the following disease processes/diagnoses(primary or secondary)?  Other   Prep survey completed?  Yes          Gauri Blum RN

## 2018-09-24 NOTE — TELEPHONE ENCOUNTER
Hospice has started taking over care for Mrs. Judge. They are waiting to determine if they can continue to use the [facility] monitor. Nazia daughter will call to confirm if they can and that [facility] is definitely going to manage Mrs. Judge care.Once we hear back, then will discharge her from [facility].

## 2018-09-27 NOTE — TELEPHONE ENCOUNTER
Dr. Hanna    Mrs. Judge' daughter, Nazia Britton, called the clinic to confirm that Mrs. Judge is now being seen and managed by Dr. Bruce at [facility]. Dr. Bruce has been testing Mrs. Rodriguez INR and adjusting her warfarin dose as appropriate. Unless you request otherwise, we will defer further management to this provider. Please let us know if there is anything else that we can do at this time, or if you would like [facility] to send any records.      Thank you for allowing us to participate in Mrs. Judge' care.     Lita Pascual, PharmD  9/27/2018  12:00 PM

## 2018-12-12 NOTE — PLAN OF CARE
EBONI TOLBERT    Patient Age: 77 year old   Refill request by: Pharmacy fax  Refill to be: ePrescribed to [facility] pharmacy    Medication requested to be refilled:   Atenolol 25 mg -patient requesting cheaper alternative.  $7 for atenolol.  Please send new script if appropriate.       Next and Last Visit with Provider and Department  Last visit with this provider: 12/4/2018  Last visit with this department: 12/4/2018    Next visit with this provider: Visit date not found  Next visit with this department: Visit date not found    WEIGHT AND HEIGHT:   Wt Readings from Last 1 Encounters:   08/28/17 84.8 kg (187 lb)     Ht Readings from Last 1 Encounters:   06/29/17 5' 3\" (1.6 m)     BMI Readings from Last 1 Encounters:   08/28/17 33.13 kg/m²       ALLERGIES:  Colt   (food or med); Influenza vaccines; and Hydrocodone-acetaminophen  Current Outpatient Medications   Medication   • acetaminophen (TYLENOL) 650 MG CR tablet   • amLODIPine (NORVASC) 10 MG tablet   • fluticasone (FLONASE) 50 MCG/ACT nasal spray   • ibuprofen powder   • lisinopril (PRINIVIL,ZESTRIL) 40 MG tablet   • metoPROLOL succinate (TOPROL-XL) 50 MG 24 hr tablet   • Omega-3 Fatty Acids (FISH OIL PO)   • simvastatin (ZOCOR) 10 MG tablet     No current facility-administered medications for this visit.      PHARMACY to use: [facility]          Pharmacy preference(s) on file:   [id number] [place], [place] - [address]  [address]  [facility] [address]  Phone: [phone] Fax: [phone]      CALL BACK INFO: patient to check with pharmacy  ROUTING: Patient's physician/staff        PCP: Kerry Argueta MD         INS: Payor: MEDICARE / Plan: PARTA AND B / Product Type: MEDICARE   PATIENT ADDRESS:  [address] [place] [id number]    Problem: Patient Care Overview  Goal: Interprofessional Rounds/Family Conf  Outcome: Ongoing (interventions implemented as appropriate)  Palliative Team Conference: WILLIAM Chandra RN, PN; JS Calloway LCSW, [facility]; COMFORT Orlando DO; AWILDA Ford RN, PN; STEPHANY Barrientos, APRN   09/18/18 9562   Interdisciplinary Rounds/Family Conf   Summary Pt seen by JS Calloway LCSW, and Dr. Orlando. Hospice consult in place; pt and family hopeful to discharge home today.

## 2024-10-03 NOTE — PROGRESS NOTES
Anticoagulation Clinic - Remote Progress Note  HOME MONITOR  Frequency of Monitoring: weekly, every Wednesday    Indication: afib  Referring Provider: Cyndi  Initial Warfarin Start Date: 15 years aog  Goal INR: 2-3  Current Drug Interactions: aspirin, levothyroxine, ropinirole  CHADS-VASc: 5 (age, gender, HTN, CHF)    Diet: frequent, consistent  Alcohol: none  Tobacco: none  OTC Pain Medication: Excedrin (meloxicam)    1st clinic visit: 10/18/17    INR History:  Date  10/4 10/9 10/18 10/25 11/1 11/8 11/15 11/22   Total Weekly Dose 16.5 mg 14mg 14mg 14mg 11mg 9mg 10mg 10mg 9mg   INR  1.8 2.5 3.8 3.4 1.9 2.2 3.3 2.7   Notes pre- amio amio start 9/27 ([facility])  clinic    Macrobid      Date 11/29 12/6 12/11 12/13 12/20 12/27 1/3/18 1/8 1/15   Total Weekly Dose 9mg 9mg 10mg 11mg 10mg 9mg 12mg 13mg 14mg   INR 2.5 1.8 1.84 2.5 2.2 1.7 1.7 1.9 2.2   Notes   PAT  amio stop 12/14 1 missed dose boost       Date 1/24           Total Weekly Dose 14mg           INR 2.6           Notes              Phone Interview:  Tablet Strength: pt has 2mg tablets  Current Maintenance Dose: 2mg daily  Patient Findings      Negatives Signs/symptoms of thrombosis, Signs/symptoms of bleeding, Laboratory test error suspected, Change in health, Change in alcohol use, Change in activity, Upcoming invasive procedure, Emergency department visit, Upcoming dental procedure, Missed doses, Extra doses, Change in medications, Change in diet/appetite, Hospital admission, Bruising, Other complaints   Patient Contact Info: [phone] (Nazia Britton, daughter)  Lab Contact Info: [phone] mdINR    Plan:  1. INR is therapeutic today at 2.6. Will instruct Mrs. Britton to have her mother continue warfarin 2mg daily.   2. Repeat INR in 1 week. Patient usually tests on Wednesday.   3. Verbal information provided over the phone to pt's dtr, Nazia Britton. She expresses understanding with teach back and has no further questions at this time.    Jaylene  [place], Pharmacy Intern  1/24/2018  10:20 AM     ILynn, [facility], have reviewed the note in full and agree with the assessment and plan.  01/24/18  10:39 AM     [Increasing age ( >40 years old)] : Increasing age ( >40 years old) [Altered mobility] : Altered mobility [Obesity: BMI >25] : Obesity: BMI >25 [No therapy indicated for cases scheduled for less than one hour] : No therapy indicated for cases scheduled for less than one hour. [FreeTextEntry1] : Malignant Hyperthermia Screening Tool and Risk of Bleeding Assessment  Mr. DALILA HERNADEZ denies family history of unexpected death following Anesthesia or Exercise. Denies Family history of Malignant Hyperthermia, Muscle or Neuromuscular disorder and High Temperature following exercise.  Mr. DALILA HERNADEZ denies history of Muscle Spasm, Dark or Chocolate - Colored urine and Unanticipated fever immediately following anesthesia or serious exercise.  Mr. HERNADEZ also denies bleeding tendencies/ Risks of Bleeding.

## (undated) DEVICE — TOTAL TRAY, URNMTR, SILV, LF, 16FR 10ML: Brand: MEDLINE

## (undated) DEVICE — Device

## (undated) DEVICE — ARMADA 35 PTA CATHETER 9 MM X 20 MM X 135 CM / OVER-THE-WIRE: Brand: ARMADA

## (undated) DEVICE — DESTINATION RENAL GUIDING SHEATH: Brand: DESTINATION

## (undated) DEVICE — SYS TRNSEP ACC BRK ACROSS A/ 71CM

## (undated) DEVICE — Device: Brand: MEDEX

## (undated) DEVICE — ST INF PRI SMRTSTE 20DRP 2VLV 24ML 117

## (undated) DEVICE — INTRO SHEATH ENGAGE W/50 GW .038IN 8F25

## (undated) DEVICE — DEV CAP FEMALE/MALE LL

## (undated) DEVICE — DRSNG SURESITE123 4X4.8IN

## (undated) DEVICE — SOL NACL 0.9PCT 1000ML

## (undated) DEVICE — STPCK 3/WY HP M/RA W/OFF/HNDL 1050PSI STRL

## (undated) DEVICE — INTRO SHEATH ART/FEM ENGAGE .035 8F12CM

## (undated) DEVICE — GW FC J TFE/COAT .025 3MM 180CM

## (undated) DEVICE — STERILE (15.2 TAPERED TO 7.6 X 183CM) POLYETHYLENE ACCORDION-FOLDED COVER FOR USE WITH SIEMENS ACUNAV ULTRASOUND CATHETER FAMILY CONNECTOR: Brand: SWIFTLINK TRANSDUCER COVER

## (undated) DEVICE — VIPERTRACK, 50 PACK: Brand: VIPERTRACK

## (undated) DEVICE — SWAN-GANZ THERMODILUTION CATHETER: Brand: SWAN-GANZ

## (undated) DEVICE — PINNACLE INTRODUCER SHEATH: Brand: PINNACLE

## (undated) DEVICE — DRSNG PRESS SAFEGUARD

## (undated) DEVICE — CATH ULTRASND ECHO ACUNAV FOR ACUSON 8F 90CM

## (undated) DEVICE — TORFLEX TRANSSEPTAL SHEATH; TRANSSEPTAL DILATOR; J-TIP GUIDEWIRE: Brand: TORFLEX TRANSSEPTAL GUIDING SHEATH

## (undated) DEVICE — ST EXT IV SMARTSITE 2VLV SP M LL 5ML IV1

## (undated) DEVICE — INTRO SHEATH 8F60CM

## (undated) DEVICE — LIMB HOLDERS: Brand: DEROYAL

## (undated) DEVICE — SET PRIMARY GRVTY 10DP MALE LL 104IN

## (undated) DEVICE — HI-TORQUE STEELCORE 18 PERIPHERAL GUIDEWIRE 190 CM: Brand: HI-TORQUE STEELCORE

## (undated) DEVICE — KT MANIFOLD CATHLAB CUST

## (undated) DEVICE — 3M™ BAIR HUGGER® UNDERBODY BLANKET, ADULT, 10 PER CASE 54500: Brand: BAIR HUGGER™

## (undated) DEVICE — SOLUTION SET, MALE LUER LOCK ADAPTER

## (undated) DEVICE — PTA BALLOON DILATATION CATHETER: Brand: STERLING™

## (undated) DEVICE — GW J TP FIX CORE .035 150

## (undated) DEVICE — ADULT, W/LG. BACK PAD, RADIOTRANSPARENT ELEMENT AND LEAD WIRE: Brand: DEFIBRILLATION ELECTRODES

## (undated) DEVICE — INTRO SHEATH ART/FEM ENGAGE .035 7F12CM

## (undated) DEVICE — ST EXT IV LG BORE NDLESS FLTR LL 17IN

## (undated) DEVICE — INTRAOPERATIVE COVER KIT, 10 PACK: Brand: SITE-RITE

## (undated) DEVICE — RADIFOCUS GLIDEWIRE: Brand: GLIDEWIRE

## (undated) DEVICE — PRESSURE MONITORING SET: Brand: TRUWAVE, VAMP

## (undated) DEVICE — GUIDE CATHETER: Brand: MACH1™

## (undated) DEVICE — PK CATH CARD 10

## (undated) DEVICE — DRSNG SURESITE WNDW 2.38X2.75

## (undated) DEVICE — LN INJ CONTRST FLXCIL HP F/M LL 1200PSI48

## (undated) DEVICE — CATH DIAG EXPO PIG .056IN 6F 125CM

## (undated) DEVICE — DEV INFL MONARCH 25W

## (undated) DEVICE — CATH DIAG EXPO M/ PK 6FR FL4/FR4 PIG 3PK

## (undated) DEVICE — DIL VESL .038 16F 19CM

## (undated) DEVICE — DECANT BG O JET

## (undated) DEVICE — GW TPR/CORE CERBRL HEP HN .035 15X260

## (undated) DEVICE — LEX ELECTRO PHYSIOLOGY: Brand: MEDLINE INDUSTRIES, INC.

## (undated) DEVICE — INTRO SHEATH ENGAGE W/50 GW .038 9F12

## (undated) DEVICE — Device: Brand: RFP-100A CONNECTOR CABLE

## (undated) DEVICE — CATH DIAG EXPO .056 AR1 6F 100CM

## (undated) DEVICE — GW DIAG .032

## (undated) DEVICE — INTRO CHECKFLO/XL .035IN 20F65CM

## (undated) DEVICE — DECANTER: Brand: UNBRANDED

## (undated) DEVICE — CANN NASL CO2 DIVIDED A/